# Patient Record
Sex: MALE | Race: WHITE | Employment: OTHER | URBAN - METROPOLITAN AREA
[De-identification: names, ages, dates, MRNs, and addresses within clinical notes are randomized per-mention and may not be internally consistent; named-entity substitution may affect disease eponyms.]

---

## 2017-05-22 PROBLEM — M51.36 DDD (DEGENERATIVE DISC DISEASE), LUMBAR: Status: ACTIVE | Noted: 2017-05-22

## 2017-05-22 PROBLEM — M51.369 DDD (DEGENERATIVE DISC DISEASE), LUMBAR: Status: ACTIVE | Noted: 2017-05-22

## 2017-05-22 PROBLEM — M48.061 LUMBAR SPINAL STENOSIS: Status: ACTIVE | Noted: 2017-05-22

## 2019-01-10 ENCOUNTER — HOSPITAL ENCOUNTER (OUTPATIENT)
Age: 69
Discharge: HOME | End: 2019-01-10
Payer: MEDICARE

## 2019-01-10 LAB
EKG ATRIAL RATE: 64 BPM
EKG P AXIS: 24 DEGREES
EKG P-R INTERVAL: 170 MS
EKG Q-T INTERVAL: 416 MS
EKG QRS DURATION: 114 MS
EKG QTC CALCULATION (BAZETT): 429 MS
EKG R AXIS: -18 DEGREES
EKG T AXIS: 26 DEGREES
EKG VENTRICULAR RATE: 64 BPM

## 2019-01-10 PROCEDURE — 93005 ELECTROCARDIOGRAM TRACING: CPT | Performed by: ANESTHESIOLOGY

## 2019-01-10 PROCEDURE — 93010 ELECTROCARDIOGRAM REPORT: CPT | Performed by: INTERNAL MEDICINE

## 2019-12-29 ENCOUNTER — APPOINTMENT (OUTPATIENT)
Dept: GENERAL RADIOLOGY | Age: 69
End: 2019-12-29
Payer: MEDICARE

## 2019-12-29 ENCOUNTER — HOSPITAL ENCOUNTER (EMERGENCY)
Age: 69
Discharge: HOME | End: 2019-12-29
Attending: EMERGENCY MEDICINE
Payer: MEDICARE

## 2019-12-29 VITALS
TEMPERATURE: 97.7 F | HEART RATE: 70 BPM | BODY MASS INDEX: 34.46 KG/M2 | OXYGEN SATURATION: 95 % | HEIGHT: 73 IN | RESPIRATION RATE: 16 BRPM | WEIGHT: 260 LBS | SYSTOLIC BLOOD PRESSURE: 131 MMHG | DIASTOLIC BLOOD PRESSURE: 59 MMHG

## 2019-12-29 LAB
ALBUMIN SERPL-MCNC: 3.8 G/DL (ref 3.5–5.2)
ALP BLD-CCNC: 59 U/L (ref 40–129)
ALT SERPL-CCNC: 12 U/L (ref 0–40)
ANION GAP SERPL CALCULATED.3IONS-SCNC: 13 MMOL/L (ref 7–16)
AST SERPL-CCNC: 12 U/L (ref 0–39)
BASOPHILS ABSOLUTE: 0.03 E9/L (ref 0–0.2)
BASOPHILS RELATIVE PERCENT: 0.3 % (ref 0–2)
BILIRUB SERPL-MCNC: 0.7 MG/DL (ref 0–1.2)
BUN BLDV-MCNC: 16 MG/DL (ref 8–23)
CALCIUM SERPL-MCNC: 9.1 MG/DL (ref 8.6–10.2)
CHLORIDE BLD-SCNC: 101 MMOL/L (ref 98–107)
CO2: 23 MMOL/L (ref 22–29)
CREAT SERPL-MCNC: 0.8 MG/DL (ref 0.7–1.2)
EKG ATRIAL RATE: 66 BPM
EKG P AXIS: 23 DEGREES
EKG P-R INTERVAL: 174 MS
EKG Q-T INTERVAL: 420 MS
EKG QRS DURATION: 112 MS
EKG QTC CALCULATION (BAZETT): 440 MS
EKG R AXIS: -25 DEGREES
EKG T AXIS: 18 DEGREES
EKG VENTRICULAR RATE: 66 BPM
EOSINOPHILS ABSOLUTE: 0.17 E9/L (ref 0.05–0.5)
EOSINOPHILS RELATIVE PERCENT: 1.9 % (ref 0–6)
GFR AFRICAN AMERICAN: >60
GFR NON-AFRICAN AMERICAN: >60 ML/MIN/1.73
GLUCOSE BLD-MCNC: 328 MG/DL (ref 74–99)
HCT VFR BLD CALC: 42.4 % (ref 37–54)
HEMOGLOBIN: 14 G/DL (ref 12.5–16.5)
IMMATURE GRANULOCYTES #: 0.04 E9/L
IMMATURE GRANULOCYTES %: 0.4 % (ref 0–5)
LYMPHOCYTES ABSOLUTE: 1.15 E9/L (ref 1.5–4)
LYMPHOCYTES RELATIVE PERCENT: 12.6 % (ref 20–42)
MCH RBC QN AUTO: 32 PG (ref 26–35)
MCHC RBC AUTO-ENTMCNC: 33 % (ref 32–34.5)
MCV RBC AUTO: 96.8 FL (ref 80–99.9)
MONOCYTES ABSOLUTE: 0.48 E9/L (ref 0.1–0.95)
MONOCYTES RELATIVE PERCENT: 5.3 % (ref 2–12)
NEUTROPHILS ABSOLUTE: 7.23 E9/L (ref 1.8–7.3)
NEUTROPHILS RELATIVE PERCENT: 79.5 % (ref 43–80)
PDW BLD-RTO: 13.2 FL (ref 11.5–15)
PLATELET # BLD: 151 E9/L (ref 130–450)
PMV BLD AUTO: 10 FL (ref 7–12)
POTASSIUM SERPL-SCNC: 4.2 MMOL/L (ref 3.5–5)
RBC # BLD: 4.38 E12/L (ref 3.8–5.8)
SODIUM BLD-SCNC: 137 MMOL/L (ref 132–146)
TOTAL PROTEIN: 6.9 G/DL (ref 6.4–8.3)
TROPONIN: <0.01 NG/ML (ref 0–0.03)
TSH SERPL DL<=0.05 MIU/L-ACNC: 1.27 UIU/ML (ref 0.27–4.2)
WBC # BLD: 9.1 E9/L (ref 4.5–11.5)

## 2019-12-29 PROCEDURE — 85025 COMPLETE CBC W/AUTO DIFF WBC: CPT

## 2019-12-29 PROCEDURE — 2500000003 HC RX 250 WO HCPCS: Performed by: PHYSICIAN ASSISTANT

## 2019-12-29 PROCEDURE — 96374 THER/PROPH/DIAG INJ IV PUSH: CPT

## 2019-12-29 PROCEDURE — 80053 COMPREHEN METABOLIC PANEL: CPT

## 2019-12-29 PROCEDURE — 84484 ASSAY OF TROPONIN QUANT: CPT

## 2019-12-29 PROCEDURE — 99285 EMERGENCY DEPT VISIT HI MDM: CPT

## 2019-12-29 PROCEDURE — 93005 ELECTROCARDIOGRAM TRACING: CPT | Performed by: PHYSICIAN ASSISTANT

## 2019-12-29 PROCEDURE — 71046 X-RAY EXAM CHEST 2 VIEWS: CPT

## 2019-12-29 PROCEDURE — 6370000000 HC RX 637 (ALT 250 FOR IP): Performed by: PHYSICIAN ASSISTANT

## 2019-12-29 PROCEDURE — 96375 TX/PRO/DX INJ NEW DRUG ADDON: CPT

## 2019-12-29 PROCEDURE — 6360000002 HC RX W HCPCS: Performed by: PHYSICIAN ASSISTANT

## 2019-12-29 PROCEDURE — 36415 COLL VENOUS BLD VENIPUNCTURE: CPT

## 2019-12-29 PROCEDURE — 84443 ASSAY THYROID STIM HORMONE: CPT

## 2019-12-29 RX ORDER — ASPIRIN 81 MG/1
324 TABLET, CHEWABLE ORAL ONCE
Status: COMPLETED | OUTPATIENT
Start: 2019-12-29 | End: 2019-12-29

## 2019-12-29 RX ORDER — DIPHENHYDRAMINE HYDROCHLORIDE 50 MG/ML
25 INJECTION INTRAMUSCULAR; INTRAVENOUS ONCE
Status: COMPLETED | OUTPATIENT
Start: 2019-12-29 | End: 2019-12-29

## 2019-12-29 RX ORDER — PREDNISONE 10 MG/1
TABLET ORAL
Qty: 20 TABLET | Refills: 0 | Status: SHIPPED | OUTPATIENT
Start: 2019-12-29 | End: 2020-01-08

## 2019-12-29 RX ORDER — METHYLPREDNISOLONE SODIUM SUCCINATE 125 MG/2ML
125 INJECTION, POWDER, LYOPHILIZED, FOR SOLUTION INTRAMUSCULAR; INTRAVENOUS ONCE
Status: COMPLETED | OUTPATIENT
Start: 2019-12-29 | End: 2019-12-29

## 2019-12-29 RX ADMIN — ASPIRIN 81 MG 324 MG: 81 TABLET ORAL at 08:34

## 2019-12-29 RX ADMIN — DIPHENHYDRAMINE HYDROCHLORIDE 25 MG: 50 INJECTION, SOLUTION INTRAMUSCULAR; INTRAVENOUS at 08:37

## 2019-12-29 RX ADMIN — FAMOTIDINE 20 MG: 10 INJECTION, SOLUTION INTRAVENOUS at 08:40

## 2019-12-29 RX ADMIN — METHYLPREDNISOLONE SODIUM SUCCINATE 125 MG: 125 INJECTION, POWDER, FOR SOLUTION INTRAMUSCULAR; INTRAVENOUS at 08:35

## 2019-12-29 ASSESSMENT — PAIN DESCRIPTION - DESCRIPTORS: DESCRIPTORS: ITCHING

## 2019-12-29 ASSESSMENT — PAIN SCALES - GENERAL: PAINLEVEL_OUTOF10: 10

## 2019-12-29 NOTE — ED PROVIDER NOTES
ED Attending  CC: No        HPI:  12/29/19,   Time: 8:17 AM         Aye Walker is a 78 y.o. male presenting to the ED for hives and CP, beginning 4 days ago. The complaint has been intermittent, moderate in severity, and worsened by nothing. The patient presents to the emergency room via private car complaining of a rash. He reports a rash on his hands upper extremities chest and back that began a few days ago out of the blue. He states that the rash is itchy and uncomfortable. He is not on any new medications. Denies any new soaps lotions or shampoos. He also reports intermittent chest discomfort. The patient states that the pain is left-sided and does not radiate. He reports that it started on Ethan day and then resolved about a day and a half later. He denies any shortness of breath nausea or diaphoresis. The patient states he does feel a little irritable which is similar to the way he felt 4 years ago when he had his heart attack. The patient states that he had a catheterization at the time and Dr. Lopez Upton placed a coronary stent. The patient reports that this was 4 years ago. He did have a stress test he believes about a year ago. The patient is a diabetic as well. He is not having any chest pain at this time.         ROS:     Constitutional: Negative for fever and chills  HENT: Negative for ear pain, sore throat and sinus pressure  Eyes: Negative for pain, discharge and redness  Respiratory:  See HPI  Cardiovascular: See HPI  Gastrointestinal: Negative for nausea, vomiting, diarrhea and abdominal distention  Genitourinary: Negative for dysuria and frequency  Musculoskeletal: Negative for back pain and arthralgia  Skin: See HPI  Neurological: Negative for weakness and headaches  Hematological: Negative for adenopathy    All other systems reviewed and are negative      -------------------------------- PAST HISTORY ----------------------------------  Past Medical History:  has a past 0. 17 0.05 - 0.50 E9/L    Basophils Absolute 0.03 0.00 - 0.20 E9/L   Comprehensive Metabolic Panel   Result Value Ref Range    Sodium 137 132 - 146 mmol/L    Potassium 4.2 3.5 - 5.0 mmol/L    Chloride 101 98 - 107 mmol/L    CO2 23 22 - 29 mmol/L    Anion Gap 13 7 - 16 mmol/L    Glucose 328 (H) 74 - 99 mg/dL    BUN 16 8 - 23 mg/dL    CREATININE 0.8 0.7 - 1.2 mg/dL    GFR Non-African American >60 >=60 mL/min/1.73    GFR African American >60     Calcium 9.1 8.6 - 10.2 mg/dL    Total Protein 6.9 6.4 - 8.3 g/dL    Alb 3.8 3.5 - 5.2 g/dL    Total Bilirubin 0.7 0.0 - 1.2 mg/dL    Alkaline Phosphatase 59 40 - 129 U/L    ALT 12 0 - 40 U/L    AST 12 0 - 39 U/L   Troponin   Result Value Ref Range    Troponin <0.01 0.00 - 0.03 ng/mL   TSH without Reflex   Result Value Ref Range    TSH 1.270 0.270 - 4.200 uIU/mL   EKG 12 Lead   Result Value Ref Range    Ventricular Rate 66 BPM    Atrial Rate 66 BPM    P-R Interval 174 ms    QRS Duration 112 ms    Q-T Interval 420 ms    QTc Calculation (Bazett) 440 ms    P Axis 23 degrees    R Axis -25 degrees    T Axis 18 degrees       RADIOLOGY:  Interpreted by Radiologist.  XR CHEST STANDARD (2 VW)   Final Result   1. No acute cardiopulmonary disease.          ----------------- NURSING NOTES AND VITALS REVIEWED ---------------   The nursing notes within the ED encounter and vital signs as below have been reviewed. /78   Pulse 70   Temp 98 °F (36.7 °C) (Oral)   Resp 16   Ht 6' 1\" (1.854 m)   Wt 260 lb (117.9 kg)   SpO2 98%   BMI 34.30 kg/m²   Oxygen Saturation Interpretation: Normal      --------------------------------PHYSICAL EXAM------------------------------------      Constitutional/General: Alert and oriented x3, well appearing, non toxic in NAD  Head: NC/AT  Eyes: PERRL, EOMI  Mouth: Oropharynx clear, handling secretions, no trismus  Neck: Supple, full ROM, no meningeal signs  Pulmonary: Lungs clear to auscultation bilaterally, no wheezes, rales, or rhonchi.  Not in respiratory distress  Cardiovascular:  Regular rate and rhythm, no murmurs, gallops, or rubs. 2+ distal pulses  Abdomen: Soft, + BS. No distension. Nontender. No palpable rigidity, rebound or guarding  Extremities: Moves all extremities x 4. Warm and well perfused  Skin: urticarial rash noted on upper back, chest and palms  Neurologic: GCS 15,  Intact. No focal deficits  Psych: Normal Affect      ------------------------ ED COURSE/MEDICAL DECISION MAKING----------------------  Medications   aspirin chewable tablet 324 mg (324 mg Oral Given 12/29/19 0834)   diphenhydrAMINE (BENADRYL) injection 25 mg (25 mg Intravenous Given 12/29/19 0837)   methylPREDNISolone sodium (SOLU-MEDROL) injection 125 mg (125 mg Intravenous Given 12/29/19 0835)   famotidine (PEPCID) injection 20 mg (20 mg Intravenous Given 12/29/19 0840)         Medical Decision Making:    *The patient's work-up here is negative. No ischemic changes seen on EKG and his labs are completely normal including CBC CMP and troponin other than hyperglycemia. Exact cause of the hives is uncertain. He needs to go back into his diet and life and see if he can figure out anything new or different. He states that he will discuss this with his wife. Advised to follow-up with his PCP in the a.m. as planned. Further cardiac work-up as indicated. He probably should see his cardiologist in the near future as well. The patient is aware and agreeable to this plan. Plan discharge home with prednisone x5 days. He can use Benadryl and some topical hydrocortisone to the areas as needed. **  ED Course as of Dec 29 1016   Sun Dec 29, 2019   0823 ATTENDING PROVIDER ATTESTATION:     I have personally performed and/or participated in the history, exam, medical decision making, and procedures and agree with all pertinent clinical information unless otherwise noted.     I have also reviewed and agree with the past medical, family and social history unless otherwise noted.  My findings/plan: Patient here complaining of hives starting yesterday, itching. Involves extremities and torso. No known exposures. No facial swelling. No difficulty breathing or swallowing. No chest pain. Incidental during the entire review of systems he comments on some chest pain that he has been having 5 and 6 days ago couple of fleeting episodes of left chest pain. Not associated shortness of breath or palpitations or syncope and has had no recurrence since then. No symptoms today. No treatment prior to arrival for anything. On exam he is awake, alert and oriented with no focal neurologic deficit. Heart rate regular. No angioedema, no facial edema. Lungs are clear and equal.  Abdomen nontender. Mild erythematous blanching hives noted to the torso and extremities, he is scratching. No other rashes. No petechia or purpura. No pretibial edema or calf pain. [NC]      ED Course User Index  [NC] [place], DO       Counseling: The emergency provider has spoken with the  and discussed todays results, in addition to providing specific details for the plan of care and counseling regarding the diagnosis and prognosis. Questions are answered at this time and they are agreeable with the plan.      ------------------------ IMPRESSION AND DISPOSITION -------------------------------    IMPRESSION  1. Urticaria    2.  Chest pain, unspecified type        DISPOSITION  Disposition: Discharge to home  Patient condition is stable                   Lino Marquez PA-C  12/29/19 3384

## 2019-12-29 NOTE — ED NOTES
Patient to x-ray at this time.       [facility]  12/29/19 1079

## 2020-01-09 ENCOUNTER — OFFICE VISIT (OUTPATIENT)
Dept: CARDIOLOGY CLINIC | Age: 70
End: 2020-01-09
Payer: MEDICARE

## 2020-01-09 ENCOUNTER — TELEPHONE (OUTPATIENT)
Dept: CARDIOLOGY CLINIC | Age: 70
End: 2020-01-09

## 2020-01-09 VITALS
WEIGHT: 250 LBS | SYSTOLIC BLOOD PRESSURE: 124 MMHG | HEART RATE: 65 BPM | BODY MASS INDEX: 33.13 KG/M2 | DIASTOLIC BLOOD PRESSURE: 70 MMHG | HEIGHT: 73 IN

## 2020-01-09 PROBLEM — I63.9 CVA (CEREBROVASCULAR ACCIDENT) (HCC): Status: ACTIVE | Noted: 2020-01-09

## 2020-01-09 PROCEDURE — 93000 ELECTROCARDIOGRAM COMPLETE: CPT | Performed by: INTERNAL MEDICINE

## 2020-01-09 PROCEDURE — 99214 OFFICE O/P EST MOD 30 MIN: CPT | Performed by: INTERNAL MEDICINE

## 2020-01-09 RX ORDER — LOSARTAN POTASSIUM 50 MG/1
50 TABLET ORAL DAILY
Status: ON HOLD | COMMUNITY
End: 2020-08-07 | Stop reason: HOSPADM

## 2020-01-09 RX ORDER — AMLODIPINE BESYLATE 5 MG/1
5 TABLET ORAL DAILY
Status: ON HOLD | COMMUNITY
End: 2020-08-07 | Stop reason: HOSPADM

## 2020-01-09 RX ORDER — ISOSORBIDE MONONITRATE 30 MG/1
30 TABLET, EXTENDED RELEASE ORAL DAILY
Qty: 30 TABLET | Refills: 5 | Status: ON HOLD
Start: 2020-01-09 | End: 2020-08-04

## 2020-01-09 RX ORDER — INSULIN GLARGINE 100 [IU]/ML
50 INJECTION, SOLUTION SUBCUTANEOUS 2 TIMES DAILY
COMMUNITY

## 2020-01-09 RX ORDER — INSULIN NPH HUM/REG INSULIN HM 70-30/ML
30 VIAL (ML) SUBCUTANEOUS EVERY MORNING
COMMUNITY

## 2020-01-09 RX ORDER — GLIPIZIDE 10 MG/1
10 TABLET ORAL 4 TIMES DAILY
COMMUNITY
End: 2020-11-20 | Stop reason: DRUGHIGH

## 2020-01-09 RX ORDER — ZINC GLUCONATE 50 MG
50 TABLET ORAL DAILY
Status: ON HOLD | COMMUNITY
End: 2020-11-25 | Stop reason: HOSPADM

## 2020-01-09 RX ORDER — MAGNESIUM OXIDE 400 MG/1
400 TABLET ORAL DAILY
COMMUNITY

## 2020-01-09 RX ORDER — METOPROLOL TARTRATE 100 MG/1
100 TABLET ORAL 2 TIMES DAILY
COMMUNITY
End: 2020-11-20 | Stop reason: DRUGHIGH

## 2020-01-09 NOTE — PROGRESS NOTES
removed from his left ear about a month ago  11. Hemoglobin A1c is unknown states his sugars have been out of whack having been on steroid and Benadryl for a rash in the recent past                  The above issues have been discussed with the patient in detail        Please note: past medical records were reviewed per electronic medical record (EMR) - see detailed reports under Past Medical/ Surgical History. PAST MEDICAL HISTORY:    Past Medical History:   Diagnosis Date    Arthritis     CAD (coronary artery disease) 3/20/2014    Cancer ([facility]     skin-x2 (ear-Dr. Дмитрий Amado)    Chronic fatigue fibromyalgia syndrome     Deafness in right ear     Diabetes mellitus ([facility]     type 2    GERD (gastroesophageal reflux disease)     Hypertension     Morbid obesity ([facility]     Neuromuscular disorder ([facility]     Non-ST elevation MI (NSTEMI) ([facility] 10/2015    patient was transported to [facility] due to aversion from [address]:    Past Surgical History:   Procedure Laterality Date    CARDIAC CATHETERIZATION  3 11 14    Mildl to moderate CAD. Normal left venricular size and systolic functiion. Systemic hypertension. [facility]    COLONOSCOPY      CORONARY ANGIOPLASTY  10/2015    Dr. Joel Lewis MID/DISTAL CX. PCI of the mid and distal dx artery with Alpine ZACARIAS with MATT grade III flow    DIAGNOSTIC CARDIAC CATH LAB PROCEDURE  02/15/2016    PATENT STENT IN CX, LAD 30%, RCA 40% AT [facility]. EF 60% EF    ENDOSCOPY, COLON, DIAGNOSTIC      GALLBLADDER SURGERY  1982    [address]     SHOULDER ARTHROSCOPY      SKIN CANCER EXCISION  01/2020    x2-ear-Dr. Дмитрий Amado    TUMOR REMOVAL  bladder tumor       HOME MEDICATIONS:  Prior to Admission medications    Medication Sig Start Date End Date Taking?  Authorizing Provider   losartan (COZAAR) 50 MG tablet Take 50 mg by mouth daily   Yes Historical Provider, MD   amLODIPine (NORVASC) 5 MG tablet Take 5 mg by mouth 2 times daily   Yes Historical Provider, MD   glipiZIDE (GLUCOTROL) 10 MG tablet Take 10 mg by mouth 2 times daily (before meals)   Yes Historical Provider, MD   metoprolol (LOPRESSOR) 100 MG tablet Take 100 mg by mouth 2 times daily   Yes Historical Provider, MD   insulin 70-30 (HUMULIN 70/30) (70-30) 100 UNIT per ML injection vial Inject 20 Units into the skin 2 times daily   Yes Historical Provider, MD   insulin glargine (LANTUS) 100 UNIT/ML injection vial Inject 50 Units into the skin 2 times daily   Yes Historical Provider, MD   MELATONIN ER PO Take 15 mg by mouth daily   Yes Historical Provider, MD   zinc gluconate 50 MG tablet Take 50 mg by mouth daily   Yes Historical Provider, MD   magnesium oxide (MAG-OX) 400 MG tablet Take 400 mg by mouth daily   Yes Historical Provider, MD   clopidogrel (PLAVIX) 75 MG tablet Take 75 mg by mouth daily  3/27/17  Yes Historical Provider, MD   rosuvastatin (CRESTOR) 40 MG tablet 40 mg daily  4/28/17  Yes Historical Provider, MD   Ascorbic Acid (VITAMIN C) 250 MG tablet Take 1,000 mg by mouth daily   Yes Historical Provider, MD   aspirin 81 MG tablet Take 81 mg by mouth daily. Yes Historical Provider, MD   sitaGLIPtan (JANUVIA) 100 MG tablet Take 100 mg by mouth daily.    Yes Historical Provider, MD       CURRENT MEDICATIONS:      Current Outpatient Medications:     losartan (COZAAR) 50 MG tablet, Take 50 mg by mouth daily, Disp: , Rfl:     amLODIPine (NORVASC) 5 MG tablet, Take 5 mg by mouth 2 times daily, Disp: , Rfl:     glipiZIDE (GLUCOTROL) 10 MG tablet, Take 10 mg by mouth 2 times daily (before meals), Disp: , Rfl:     metoprolol (LOPRESSOR) 100 MG tablet, Take 100 mg by mouth 2 times daily, Disp: , Rfl:     insulin 70-30 (HUMULIN 70/30) (70-30) 100 UNIT per ML injection vial, Inject 20 Units into the skin 2 times daily, Disp: , Rfl:     insulin glargine (LANTUS) 100 UNIT/ML injection vial, Inject 50 Units into the skin 2 times daily, bruit. Jvp not elevated  Heart Inspection- shows no abnormal  pulsations. Heart Palpation- no heaves or thrills; PMI is non-displaced. Heart Ausculation- Regular rate and rhythm, no murmur. No s3, s4 or rub. ABDOMEN: Soft, non-tender to light palpation. Bowel sounds present. No palpable masses no organomegaly; no abdominal bruit. MS: Good muscle strength and tone. No atrophy or abnormal movements. EXTREMITIES:  : Deferred  SKIN: Warm and dry. NEURO  CN 2-12 intact  no motor deficits    PSYCH: Oriented to person, place and time. Speech clear and appropriate. Follows all commands. Pleasant affect. DATA:    ECG    (interpreted by me):    Sinus  Rhythm   -Incomplete right bundle branch block. ABNORMAL     Diagnostic:        Labs:   CBC: No results for input(s): WBC, HGB, HCT, PLT in the last 72 hours. BMP: No results for input(s): NA, K, CO2, BUN, CREATININE, LABGLOM, CALCIUM in the last 72 hours. Invalid input(s): GLU  Mag: No results for input(s): MG in the last 72 hours. Phos: No results for input(s): PHOS in the last 72 hours. TSH: No results for input(s): TSH in the last 72 hours. HgA1c:   Lab Results   Component Value Date    LABA1C 6.8 (H) 03/11/2014     No results found for: EAG  BNP: No results for input(s): BNP in the last 72 hours. PT/INR: No results for input(s): PROTIME, INR in the last 72 hours. APTT:No results for input(s): APTT in the last 72 hours. CARDIAC ENZYMES:No results for input(s): CKTOTAL, CKMB, CKMBINDEX, TROPONINI in the last 72 hours. FASTING LIPID PANEL:  Lab Results   Component Value Date    CHOL 179 08/12/2014    HDL 40 08/12/2014    LDLCALC 109 08/12/2014    TRIG 152 08/12/2014     LIVER PROFILE:No results for input(s): AST, ALT, LABALBU in the last 72 hours.           ASSESSMENT:  Jaw pain chest pain this patient with previous acute non-STEMI and 2015 with 2.5 x 38 mm alpine drug-eluting stent in the mid to distal circumflex    History of diabetes mellitus

## 2020-01-16 ENCOUNTER — HOSPITAL ENCOUNTER (OUTPATIENT)
Dept: MRI IMAGING | Age: 70
Discharge: HOME | End: 2020-01-18
Payer: MEDICARE

## 2020-01-16 PROCEDURE — 70551 MRI BRAIN STEM W/O DYE: CPT

## 2020-01-21 ENCOUNTER — HOSPITAL ENCOUNTER (OUTPATIENT)
Dept: NUCLEAR MEDICINE | Age: 70
Discharge: HOME | End: 2020-01-21
Payer: MEDICARE

## 2020-01-21 ENCOUNTER — HOSPITAL ENCOUNTER (OUTPATIENT)
Dept: NON INVASIVE DIAGNOSTICS | Age: 70
Discharge: HOME | End: 2020-01-21
Payer: MEDICARE

## 2020-01-21 ENCOUNTER — HOSPITAL ENCOUNTER (OUTPATIENT)
Dept: INTERVENTIONAL RADIOLOGY/VASCULAR | Age: 70
Discharge: HOME | End: 2020-01-23
Payer: MEDICARE

## 2020-01-21 LAB
LV EF: 45 %
LV EF: 69 %
LVEF MODALITY: NORMAL
LVEF MODALITY: NORMAL

## 2020-01-21 PROCEDURE — 6360000004 HC RX CONTRAST MEDICATION: Performed by: INTERNAL MEDICINE

## 2020-01-21 PROCEDURE — 78452 HT MUSCLE IMAGE SPECT MULT: CPT | Performed by: INTERNAL MEDICINE

## 2020-01-21 PROCEDURE — 93880 EXTRACRANIAL BILAT STUDY: CPT

## 2020-01-21 PROCEDURE — A9500 TC99M SESTAMIBI: HCPCS | Performed by: RADIOLOGY

## 2020-01-21 PROCEDURE — 93018 CV STRESS TEST I&R ONLY: CPT | Performed by: INTERNAL MEDICINE

## 2020-01-21 PROCEDURE — 3430000000 HC RX DIAGNOSTIC RADIOPHARMACEUTICAL: Performed by: RADIOLOGY

## 2020-01-21 PROCEDURE — 93016 CV STRESS TEST SUPVJ ONLY: CPT | Performed by: INTERNAL MEDICINE

## 2020-01-21 PROCEDURE — 6360000002 HC RX W HCPCS: Performed by: INTERNAL MEDICINE

## 2020-01-21 PROCEDURE — 2580000003 HC RX 258: Performed by: INTERNAL MEDICINE

## 2020-01-21 PROCEDURE — 93017 CV STRESS TEST TRACING ONLY: CPT

## 2020-01-21 PROCEDURE — C8929 TTE W OR WO FOL WCON,DOPPLER: HCPCS

## 2020-01-21 PROCEDURE — 78452 HT MUSCLE IMAGE SPECT MULT: CPT

## 2020-01-21 RX ORDER — SODIUM CHLORIDE 0.9 % (FLUSH) 0.9 %
10 SYRINGE (ML) INJECTION PRN
Status: DISCONTINUED | OUTPATIENT
Start: 2020-01-21 | End: 2020-01-22 | Stop reason: HOSPADM

## 2020-01-21 RX ADMIN — Medication 10 ML: at 11:54

## 2020-01-21 RX ADMIN — Medication 30 MILLICURIE: at 14:26

## 2020-01-21 RX ADMIN — PERFLUTREN 2.2 MG: 6.52 INJECTION, SUSPENSION INTRAVENOUS at 11:52

## 2020-01-21 RX ADMIN — REGADENOSON 0.4 MG: 0.08 INJECTION, SOLUTION INTRAVENOUS at 14:10

## 2020-01-21 RX ADMIN — Medication 10 MILLICURIE: at 11:24

## 2020-01-21 NOTE — PROCEDURES
Stress Testing Procedure Note    Type of Stress Testing:  lexiscan    Date of Procedure:  1 /21/2020  Indication:  Chest pain    Impression:    Baseline EKG unremarkable  No chest pain or ST changes  With IV lexiscan  Nuclear results pending      Radhika Del Cid DO, [place]

## 2020-01-21 NOTE — PROCEDURES
Dr. Fady Nicole present, Completed Lexiscan Protocol 0.4 mg IV per Right forearm vein . Patient experienced increased breathing which lessened post sips of diet Pepsi.

## 2020-04-01 ENCOUNTER — TELEPHONE (OUTPATIENT)
Dept: CARDIOLOGY CLINIC | Age: 70
End: 2020-04-01

## 2020-08-04 ENCOUNTER — HOSPITAL ENCOUNTER (INPATIENT)
Age: 70
LOS: 1 days | Discharge: HOME | DRG: 313 | End: 2020-08-07
Attending: STUDENT IN AN ORGANIZED HEALTH CARE EDUCATION/TRAINING PROGRAM | Admitting: INTERNAL MEDICINE
Payer: MEDICARE

## 2020-08-04 ENCOUNTER — APPOINTMENT (OUTPATIENT)
Dept: CT IMAGING | Age: 70
DRG: 313 | End: 2020-08-04
Payer: MEDICARE

## 2020-08-04 LAB
ANION GAP SERPL CALCULATED.3IONS-SCNC: 12 MMOL/L (ref 7–16)
BASOPHILS ABSOLUTE: 0.05 E9/L (ref 0–0.2)
BASOPHILS RELATIVE PERCENT: 0.7 % (ref 0–2)
BUN BLDV-MCNC: 10 MG/DL (ref 8–23)
CALCIUM SERPL-MCNC: 8.7 MG/DL (ref 8.6–10.2)
CHLORIDE BLD-SCNC: 102 MMOL/L (ref 98–107)
CO2: 25 MMOL/L (ref 22–29)
CREAT SERPL-MCNC: 0.8 MG/DL (ref 0.7–1.2)
EOSINOPHILS ABSOLUTE: 0.24 E9/L (ref 0.05–0.5)
EOSINOPHILS RELATIVE PERCENT: 3.6 % (ref 0–6)
GFR AFRICAN AMERICAN: >60
GFR NON-AFRICAN AMERICAN: >60 ML/MIN/1.73
GLUCOSE BLD-MCNC: 182 MG/DL
GLUCOSE BLD-MCNC: 218 MG/DL (ref 74–99)
HCT VFR BLD CALC: 40.4 % (ref 37–54)
HEMOGLOBIN: 13.8 G/DL (ref 12.5–16.5)
IMMATURE GRANULOCYTES #: 0.03 E9/L
IMMATURE GRANULOCYTES %: 0.4 % (ref 0–5)
LYMPHOCYTES ABSOLUTE: 1.51 E9/L (ref 1.5–4)
LYMPHOCYTES RELATIVE PERCENT: 22.4 % (ref 20–42)
MCH RBC QN AUTO: 32.3 PG (ref 26–35)
MCHC RBC AUTO-ENTMCNC: 34.2 % (ref 32–34.5)
MCV RBC AUTO: 94.6 FL (ref 80–99.9)
METER GLUCOSE: 152 MG/DL (ref 74–99)
METER GLUCOSE: 182 MG/DL (ref 74–99)
MONOCYTES ABSOLUTE: 0.49 E9/L (ref 0.1–0.95)
MONOCYTES RELATIVE PERCENT: 7.3 % (ref 2–12)
NEUTROPHILS ABSOLUTE: 4.43 E9/L (ref 1.8–7.3)
NEUTROPHILS RELATIVE PERCENT: 65.6 % (ref 43–80)
PDW BLD-RTO: 13.2 FL (ref 11.5–15)
PLATELET # BLD: 171 E9/L (ref 130–450)
PMV BLD AUTO: 10.6 FL (ref 7–12)
POTASSIUM SERPL-SCNC: 4.5 MMOL/L (ref 3.5–5)
PRO-BNP: 224 PG/ML (ref 0–125)
RBC # BLD: 4.27 E12/L (ref 3.8–5.8)
SODIUM BLD-SCNC: 139 MMOL/L (ref 132–146)
TROPONIN: <0.01 NG/ML (ref 0–0.03)
TROPONIN: <0.01 NG/ML (ref 0–0.03)
WBC # BLD: 6.8 E9/L (ref 4.5–11.5)

## 2020-08-04 PROCEDURE — 6370000000 HC RX 637 (ALT 250 FOR IP): Performed by: INTERNAL MEDICINE

## 2020-08-04 PROCEDURE — 99285 EMERGENCY DEPT VISIT HI MDM: CPT

## 2020-08-04 PROCEDURE — 82962 GLUCOSE BLOOD TEST: CPT

## 2020-08-04 PROCEDURE — 2580000003 HC RX 258: Performed by: STUDENT IN AN ORGANIZED HEALTH CARE EDUCATION/TRAINING PROGRAM

## 2020-08-04 PROCEDURE — 71250 CT THORAX DX C-: CPT

## 2020-08-04 PROCEDURE — 6370000000 HC RX 637 (ALT 250 FOR IP): Performed by: STUDENT IN AN ORGANIZED HEALTH CARE EDUCATION/TRAINING PROGRAM

## 2020-08-04 PROCEDURE — 85025 COMPLETE CBC W/AUTO DIFF WBC: CPT

## 2020-08-04 PROCEDURE — 83036 HEMOGLOBIN GLYCOSYLATED A1C: CPT

## 2020-08-04 PROCEDURE — 80048 BASIC METABOLIC PNL TOTAL CA: CPT

## 2020-08-04 PROCEDURE — 84484 ASSAY OF TROPONIN QUANT: CPT

## 2020-08-04 PROCEDURE — G0378 HOSPITAL OBSERVATION PER HR: HCPCS

## 2020-08-04 PROCEDURE — 83880 ASSAY OF NATRIURETIC PEPTIDE: CPT

## 2020-08-04 PROCEDURE — 36415 COLL VENOUS BLD VENIPUNCTURE: CPT

## 2020-08-04 RX ORDER — ROSUVASTATIN CALCIUM 10 MG/1
40 TABLET, COATED ORAL NIGHTLY
Status: DISCONTINUED | OUTPATIENT
Start: 2020-08-04 | End: 2020-08-07 | Stop reason: HOSPADM

## 2020-08-04 RX ORDER — ONDANSETRON 2 MG/ML
4 INJECTION INTRAMUSCULAR; INTRAVENOUS EVERY 6 HOURS PRN
Status: DISCONTINUED | OUTPATIENT
Start: 2020-08-04 | End: 2020-08-07

## 2020-08-04 RX ORDER — INSULIN ASPART 100 [IU]/ML
14 INJECTION, SUSPENSION SUBCUTANEOUS
COMMUNITY
End: 2020-11-20 | Stop reason: DRUGHIGH

## 2020-08-04 RX ORDER — ZINC GLUCONATE 50 MG
50 TABLET ORAL DAILY
Status: DISCONTINUED | OUTPATIENT
Start: 2020-08-05 | End: 2020-08-07 | Stop reason: HOSPADM

## 2020-08-04 RX ORDER — 0.9 % SODIUM CHLORIDE 0.9 %
500 INTRAVENOUS SOLUTION INTRAVENOUS ONCE
Status: COMPLETED | OUTPATIENT
Start: 2020-08-04 | End: 2020-08-04

## 2020-08-04 RX ORDER — NICOTINE POLACRILEX 4 MG
15 LOZENGE BUCCAL PRN
Status: DISCONTINUED | OUTPATIENT
Start: 2020-08-04 | End: 2020-08-07 | Stop reason: HOSPADM

## 2020-08-04 RX ORDER — METOPROLOL TARTRATE 50 MG/1
100 TABLET, FILM COATED ORAL 2 TIMES DAILY
Status: DISCONTINUED | OUTPATIENT
Start: 2020-08-04 | End: 2020-08-07 | Stop reason: HOSPADM

## 2020-08-04 RX ORDER — OMEPRAZOLE 20 MG/1
20 CAPSULE, DELAYED RELEASE ORAL DAILY
Status: ON HOLD | COMMUNITY
End: 2020-08-07 | Stop reason: HOSPADM

## 2020-08-04 RX ORDER — ISOSORBIDE MONONITRATE 30 MG/1
30 TABLET, EXTENDED RELEASE ORAL DAILY
Status: DISCONTINUED | OUTPATIENT
Start: 2020-08-05 | End: 2020-08-05

## 2020-08-04 RX ORDER — TAMSULOSIN HYDROCHLORIDE 0.4 MG/1
0.4 CAPSULE ORAL NIGHTLY
COMMUNITY

## 2020-08-04 RX ORDER — LANOLIN ALCOHOL/MO/W.PET/CERES
15 CREAM (GRAM) TOPICAL DAILY
Status: DISCONTINUED | OUTPATIENT
Start: 2020-08-05 | End: 2020-08-07 | Stop reason: HOSPADM

## 2020-08-04 RX ORDER — DEXTROSE MONOHYDRATE 25 G/50ML
12.5 INJECTION, SOLUTION INTRAVENOUS PRN
Status: DISCONTINUED | OUTPATIENT
Start: 2020-08-04 | End: 2020-08-07 | Stop reason: HOSPADM

## 2020-08-04 RX ORDER — ASPIRIN 81 MG/1
TABLET, CHEWABLE ORAL
Status: DISPENSED
Start: 2020-08-04 | End: 2020-08-05

## 2020-08-04 RX ORDER — ALOGLIPTIN 25 MG/1
25 TABLET, FILM COATED ORAL DAILY
Status: DISCONTINUED | OUTPATIENT
Start: 2020-08-05 | End: 2020-08-07 | Stop reason: HOSPADM

## 2020-08-04 RX ORDER — CLOPIDOGREL BISULFATE 75 MG/1
75 TABLET ORAL DAILY
Status: DISCONTINUED | OUTPATIENT
Start: 2020-08-05 | End: 2020-08-07 | Stop reason: HOSPADM

## 2020-08-04 RX ORDER — INSULIN ASPART 100 [IU]/ML
28 INJECTION, SUSPENSION SUBCUTANEOUS
COMMUNITY
End: 2020-11-20 | Stop reason: DRUGHIGH

## 2020-08-04 RX ORDER — GLIPIZIDE 5 MG/1
10 TABLET ORAL
Status: DISCONTINUED | OUTPATIENT
Start: 2020-08-05 | End: 2020-08-07 | Stop reason: HOSPADM

## 2020-08-04 RX ORDER — ASPIRIN 81 MG/1
81 TABLET, CHEWABLE ORAL DAILY
Status: DISCONTINUED | OUTPATIENT
Start: 2020-08-05 | End: 2020-08-07 | Stop reason: HOSPADM

## 2020-08-04 RX ORDER — LOSARTAN POTASSIUM 50 MG/1
50 TABLET ORAL DAILY
Status: DISCONTINUED | OUTPATIENT
Start: 2020-08-05 | End: 2020-08-05

## 2020-08-04 RX ORDER — AMLODIPINE BESYLATE 5 MG/1
5 TABLET ORAL 2 TIMES DAILY
Status: DISCONTINUED | OUTPATIENT
Start: 2020-08-04 | End: 2020-08-07 | Stop reason: HOSPADM

## 2020-08-04 RX ORDER — ASCORBIC ACID 500 MG
1000 TABLET ORAL DAILY
Status: DISCONTINUED | OUTPATIENT
Start: 2020-08-05 | End: 2020-08-07 | Stop reason: HOSPADM

## 2020-08-04 RX ORDER — ASPIRIN 81 MG/1
243 TABLET, CHEWABLE ORAL ONCE
Status: COMPLETED | OUTPATIENT
Start: 2020-08-04 | End: 2020-08-04

## 2020-08-04 RX ORDER — LANOLIN ALCOHOL/MO/W.PET/CERES
400 CREAM (GRAM) TOPICAL DAILY
Status: DISCONTINUED | OUTPATIENT
Start: 2020-08-05 | End: 2020-08-07 | Stop reason: HOSPADM

## 2020-08-04 RX ORDER — DEXTROSE MONOHYDRATE 50 MG/ML
100 INJECTION, SOLUTION INTRAVENOUS PRN
Status: DISCONTINUED | OUTPATIENT
Start: 2020-08-04 | End: 2020-08-07 | Stop reason: HOSPADM

## 2020-08-04 RX ADMIN — SODIUM CHLORIDE 500 ML: 9 INJECTION, SOLUTION INTRAVENOUS at 18:51

## 2020-08-04 RX ADMIN — ASPIRIN 81 MG CHEWABLE TABLET 243 MG: 81 TABLET CHEWABLE at 18:48

## 2020-08-04 RX ADMIN — INSULIN LISPRO 1 UNITS: 100 INJECTION, SOLUTION INTRAVENOUS; SUBCUTANEOUS at 23:00

## 2020-08-04 RX ADMIN — AMLODIPINE BESYLATE 5 MG: 5 TABLET ORAL at 22:57

## 2020-08-04 RX ADMIN — ROSUVASTATIN CALCIUM 40 MG: 10 TABLET, COATED ORAL at 23:01

## 2020-08-04 RX ADMIN — METOPROLOL TARTRATE 100 MG: 50 TABLET, FILM COATED ORAL at 22:57

## 2020-08-04 ASSESSMENT — PAIN - FUNCTIONAL ASSESSMENT: PAIN_FUNCTIONAL_ASSESSMENT: 0-10

## 2020-08-04 NOTE — ED PROVIDER NOTES
HPI:  8/4/20, Time: 4:04 PM EDT         Luciano Morse is a 78 y.o. male presenting to the ED for chest pain, beginning 3 days ago. The complaint has been intermittent, moderate in severity, and worsened by nothing. Patient states that over the weekend he started having intermittent chest pain/pressure. He would be exerting or just sitting on the couch. He would feel it substernal radiating to the left side of his chest.  Patient has a history of CAD and had an MI 3 to 4 years ago. Currently takes a baby aspirin daily. States that he felt sweaty during these episodes, but was also very hot and humid. Denies any nausea/vomiting, abdominal pain, or other symptoms at this time. Denies fevers. Patient states that his blood sugar has been within normal limits. States that this morning it was elevated at 170. No other complaints at this time. Patient denies fever/chills, cough, congestion, shortness of breath, edema, headache, visual disturbances, focal paresthesias, focal weakness, abdominal pain, nausea, vomiting, diarrhea, constipation, dysuria, hematuria, trauma, neck or back pain or other complaints. ROS:   Pertinent positives and negatives are stated within HPI, all other systems reviewed and are negative.      --------------------------------------------- PAST HISTORY ---------------------------------------------  Past Medical History:  has a past medical history of Arthritis, CAD (coronary artery disease), Cancer ([facility] Chronic fatigue fibromyalgia syndrome, Deafness in right ear, Diabetes mellitus ([facility] GERD (gastroesophageal reflux disease), Hypertension, Morbid obesity ([facility] Neuromuscular disorder ([facility] [facility] and Non-ST elevation MI (NSTEMI) ([facility] Past Surgical History:  has a past surgical history that includes Shoulder arthroscopy; Gallbladder surgery (1982); tumor removal (bladder tumor); Rotator cuff repair (Right);  Endoscopy, colon, diagnostic; Colonoscopy; Cardiac catheterization (3 VITALS REVIEWED ---------------------------   The nursing notes within the ED encounter and vital signs as below have been reviewed by myself. BP (!) 200/100   Pulse 67   Temp 97.9 °F (36.6 °C) (Oral)   Resp 16   Ht 6' 1\" (1.854 m)   Wt 265 lb (120.2 kg)   SpO2 98%   BMI 34.96 kg/m²   Oxygen Saturation Interpretation: Normal    The patients available past medical records and past encounters were reviewed.         ------------------------------ ED COURSE/MEDICAL DECISION MAKING----------------------  Medications   aspirin 81 MG chewable tablet (has no administration in time range)   amLODIPine (NORVASC) tablet 5 mg (has no administration in time range)   vitamin C (ASCORBIC ACID) tablet 1,000 mg (has no administration in time range)   aspirin chewable tablet 81 mg (has no administration in time range)   clopidogrel (PLAVIX) tablet 75 mg (has no administration in time range)   glipiZIDE (GLUCOTROL) tablet 10 mg (has no administration in time range)   isosorbide mononitrate (IMDUR) extended release tablet 30 mg (has no administration in time range)   losartan (COZAAR) tablet 50 mg (has no administration in time range)   magnesium oxide (MAG-OX) tablet 400 mg (has no administration in time range)   metoprolol tartrate (LOPRESSOR) tablet 100 mg (has no administration in time range)   rosuvastatin (CRESTOR) tablet 40 mg (has no administration in time range)   zinc gluconate tablet 50 mg (has no administration in time range)   glucose (GLUTOSE) 40 % oral gel 15 g (has no administration in time range)   dextrose 50 % IV solution (has no administration in time range)   glucagon (rDNA) injection 1 mg (has no administration in time range)   dextrose 5 % solution (has no administration in time range)   insulin lispro (HUMALOG) injection vial 0-6 Units (has no administration in time range)   insulin lispro (HUMALOG) injection vial 0-3 Units (has no administration in time range)   ondansetron (ZOFRAN) injection 4 mg (has no administration in time range)   enoxaparin (LOVENOX) injection 40 mg (has no administration in time range)   alogliptin (NESINA) tablet 25 mg (has no administration in time range)   melatonin tablet 15 mg (has no administration in time range)   aspirin chewable tablet 243 mg (243 mg Oral Given 8/4/20 1848)   0.9 % sodium chloride bolus (0 mLs Intravenous Stopped 8/4/20 2201)       Medical Decision Making:     Discussed imaging and laboratories also patient. Labs are within normal limits. Imaging is negative for acute pathology. Patient's history is significant for an MI 4 years ago. He did have a stress test earlier this year. Sees Dr. Regan Brink for his cardiologist.  Patient story is concerning for intermittent chest pain over the past several days. Contacted admitting physician he agreed with further management. Patient agreed with this plan as well. Patient's heart score is 5. This patient's ED cOurse included: a personal history and physicial examination, re-evaluation prior to disposition, multiple bedside re-evaluations, IV medications, cardiac monitoring, continuous pulse oximetry, complex medical decision making and emergency management and a personal history and physicial eaxmination    This patient has remained hemodynamically stable and been closely monitored during their ED course. Consultations:             Spoke with Dr. Geronimo Hardin (Medicine). Discussed case. They will admit this patient. Counseling: The emergency provider has spoken with the patient and discussed todays results, in addition to providing specific details for the plan of care and counseling regarding the diagnosis and prognosis. Questions are answered at this time and they are agreeable with the plan.       --------------------------------- IMPRESSION AND DISPOSITION ---------------------------------    IMPRESSION  1.  Chest pain, unspecified type        DISPOSITION  Disposition: Admit to telemetry  Patient condition is stable                 Blanca Spain, DO  08/04/20 8051

## 2020-08-05 LAB
ADENOVIRUS BY PCR: NOT DETECTED
ANION GAP SERPL CALCULATED.3IONS-SCNC: 13 MMOL/L (ref 7–16)
BASOPHILS ABSOLUTE: 0.06 E9/L (ref 0–0.2)
BASOPHILS RELATIVE PERCENT: 0.7 % (ref 0–2)
BORDETELLA PARAPERTUSSIS BY PCR: NOT DETECTED
BORDETELLA PERTUSSIS BY PCR: NOT DETECTED
BUN BLDV-MCNC: 9 MG/DL (ref 8–23)
CALCIUM SERPL-MCNC: 8.8 MG/DL (ref 8.6–10.2)
CHLAMYDOPHILIA PNEUMONIAE BY PCR: NOT DETECTED
CHLORIDE BLD-SCNC: 101 MMOL/L (ref 98–107)
CHOLESTEROL, TOTAL: 129 MG/DL (ref 0–199)
CO2: 25 MMOL/L (ref 22–29)
CORONAVIRUS 229E BY PCR: NOT DETECTED
CORONAVIRUS HKU1 BY PCR: NOT DETECTED
CORONAVIRUS NL63 BY PCR: NOT DETECTED
CORONAVIRUS OC43 BY PCR: NOT DETECTED
CREAT SERPL-MCNC: 0.7 MG/DL (ref 0.7–1.2)
EOSINOPHILS ABSOLUTE: 0.32 E9/L (ref 0.05–0.5)
EOSINOPHILS RELATIVE PERCENT: 3.9 % (ref 0–6)
GFR AFRICAN AMERICAN: >60
GFR NON-AFRICAN AMERICAN: >60 ML/MIN/1.73
GLUCOSE BLD-MCNC: 214 MG/DL (ref 74–99)
HBA1C MFR BLD: 7.5 % (ref 4–5.6)
HCT VFR BLD CALC: 43.6 % (ref 37–54)
HDLC SERPL-MCNC: 44 MG/DL
HEMOGLOBIN: 14.2 G/DL (ref 12.5–16.5)
HUMAN METAPNEUMOVIRUS BY PCR: NOT DETECTED
HUMAN RHINOVIRUS/ENTEROVIRUS BY PCR: NOT DETECTED
IMMATURE GRANULOCYTES #: 0.03 E9/L
IMMATURE GRANULOCYTES %: 0.4 % (ref 0–5)
INFLUENZA A BY PCR: NOT DETECTED
INFLUENZA B BY PCR: NOT DETECTED
LDL CHOLESTEROL CALCULATED: 41 MG/DL (ref 0–99)
LYMPHOCYTES ABSOLUTE: 2.02 E9/L (ref 1.5–4)
LYMPHOCYTES RELATIVE PERCENT: 24.4 % (ref 20–42)
MAGNESIUM: 1.8 MG/DL (ref 1.6–2.6)
MCH RBC QN AUTO: 31.3 PG (ref 26–35)
MCHC RBC AUTO-ENTMCNC: 32.6 % (ref 32–34.5)
MCV RBC AUTO: 96.2 FL (ref 80–99.9)
METER GLUCOSE: 163 MG/DL (ref 74–99)
METER GLUCOSE: 193 MG/DL (ref 74–99)
METER GLUCOSE: 221 MG/DL (ref 74–99)
METER GLUCOSE: 227 MG/DL (ref 74–99)
MONOCYTES ABSOLUTE: 0.53 E9/L (ref 0.1–0.95)
MONOCYTES RELATIVE PERCENT: 6.4 % (ref 2–12)
MYCOPLASMA PNEUMONIAE BY PCR: NOT DETECTED
NEUTROPHILS ABSOLUTE: 5.32 E9/L (ref 1.8–7.3)
NEUTROPHILS RELATIVE PERCENT: 64.2 % (ref 43–80)
PARAINFLUENZA VIRUS 1 BY PCR: NOT DETECTED
PARAINFLUENZA VIRUS 2 BY PCR: NOT DETECTED
PARAINFLUENZA VIRUS 3 BY PCR: NOT DETECTED
PARAINFLUENZA VIRUS 4 BY PCR: NOT DETECTED
PDW BLD-RTO: 13.4 FL (ref 11.5–15)
PHOSPHORUS: 2.8 MG/DL (ref 2.5–4.5)
PLATELET # BLD: 166 E9/L (ref 130–450)
PMV BLD AUTO: 10.6 FL (ref 7–12)
POTASSIUM SERPL-SCNC: 3.7 MMOL/L (ref 3.5–5)
PROCALCITONIN: 0.07 NG/ML (ref 0–0.08)
RBC # BLD: 4.53 E12/L (ref 3.8–5.8)
RESPIRATORY SYNCYTIAL VIRUS BY PCR: NOT DETECTED
SARS-COV-2, NAAT: NOT DETECTED
SODIUM BLD-SCNC: 139 MMOL/L (ref 132–146)
TRIGL SERPL-MCNC: 222 MG/DL (ref 0–149)
TROPONIN: <0.01 NG/ML (ref 0–0.03)
TROPONIN: <0.01 NG/ML (ref 0–0.03)
TSH SERPL DL<=0.05 MIU/L-ACNC: 1.46 UIU/ML (ref 0.27–4.2)
VLDLC SERPL CALC-MCNC: 44 MG/DL
WBC # BLD: 8.3 E9/L (ref 4.5–11.5)

## 2020-08-05 PROCEDURE — APPSS60 APP SPLIT SHARED TIME 46-60 MINUTES: Performed by: PHYSICIAN ASSISTANT

## 2020-08-05 PROCEDURE — 6370000000 HC RX 637 (ALT 250 FOR IP): Performed by: INTERNAL MEDICINE

## 2020-08-05 PROCEDURE — 84145 PROCALCITONIN (PCT): CPT

## 2020-08-05 PROCEDURE — 80061 LIPID PANEL: CPT

## 2020-08-05 PROCEDURE — 96374 THER/PROPH/DIAG INJ IV PUSH: CPT

## 2020-08-05 PROCEDURE — 87450 HC DIRECT STREP B ANTIGEN: CPT

## 2020-08-05 PROCEDURE — 82962 GLUCOSE BLOOD TEST: CPT

## 2020-08-05 PROCEDURE — G0378 HOSPITAL OBSERVATION PER HR: HCPCS

## 2020-08-05 PROCEDURE — 80048 BASIC METABOLIC PNL TOTAL CA: CPT

## 2020-08-05 PROCEDURE — 84100 ASSAY OF PHOSPHORUS: CPT

## 2020-08-05 PROCEDURE — 84443 ASSAY THYROID STIM HORMONE: CPT

## 2020-08-05 PROCEDURE — 85025 COMPLETE CBC W/AUTO DIFF WBC: CPT

## 2020-08-05 PROCEDURE — 84484 ASSAY OF TROPONIN QUANT: CPT

## 2020-08-05 PROCEDURE — 96372 THER/PROPH/DIAG INJ SC/IM: CPT

## 2020-08-05 PROCEDURE — 36415 COLL VENOUS BLD VENIPUNCTURE: CPT

## 2020-08-05 PROCEDURE — 0100U HC RESPIRPTHGN MULT REV TRANS & AMP PRB TECH 21 TRGT: CPT

## 2020-08-05 PROCEDURE — U0002 COVID-19 LAB TEST NON-CDC: HCPCS

## 2020-08-05 PROCEDURE — 6360000002 HC RX W HCPCS: Performed by: NURSE PRACTITIONER

## 2020-08-05 PROCEDURE — 94640 AIRWAY INHALATION TREATMENT: CPT

## 2020-08-05 PROCEDURE — 96376 TX/PRO/DX INJ SAME DRUG ADON: CPT

## 2020-08-05 PROCEDURE — 99214 OFFICE O/P EST MOD 30 MIN: CPT | Performed by: INTERNAL MEDICINE

## 2020-08-05 PROCEDURE — 93005 ELECTROCARDIOGRAM TRACING: CPT | Performed by: INTERNAL MEDICINE

## 2020-08-05 PROCEDURE — 83735 ASSAY OF MAGNESIUM: CPT

## 2020-08-05 PROCEDURE — 6360000002 HC RX W HCPCS: Performed by: INTERNAL MEDICINE

## 2020-08-05 RX ORDER — BUDESONIDE 0.5 MG/2ML
0.5 INHALANT ORAL 2 TIMES DAILY
Status: DISCONTINUED | OUTPATIENT
Start: 2020-08-05 | End: 2020-08-07 | Stop reason: HOSPADM

## 2020-08-05 RX ORDER — BENZONATATE 100 MG/1
100 CAPSULE ORAL 3 TIMES DAILY PRN
Status: DISCONTINUED | OUTPATIENT
Start: 2020-08-05 | End: 2020-08-07 | Stop reason: HOSPADM

## 2020-08-05 RX ORDER — ISOSORBIDE MONONITRATE 60 MG/1
60 TABLET, EXTENDED RELEASE ORAL DAILY
Status: DISCONTINUED | OUTPATIENT
Start: 2020-08-05 | End: 2020-08-07 | Stop reason: HOSPADM

## 2020-08-05 RX ORDER — BUDESONIDE AND FORMOTEROL FUMARATE DIHYDRATE 160; 4.5 UG/1; UG/1
2 AEROSOL RESPIRATORY (INHALATION) 2 TIMES DAILY
Status: DISCONTINUED | OUTPATIENT
Start: 2020-08-05 | End: 2020-08-05 | Stop reason: CLARIF

## 2020-08-05 RX ORDER — ALBUTEROL SULFATE 90 UG/1
2 AEROSOL, METERED RESPIRATORY (INHALATION) EVERY 6 HOURS PRN
Status: DISCONTINUED | OUTPATIENT
Start: 2020-08-05 | End: 2020-08-05 | Stop reason: CLARIF

## 2020-08-05 RX ORDER — ARFORMOTEROL TARTRATE 15 UG/2ML
15 SOLUTION RESPIRATORY (INHALATION) 2 TIMES DAILY
Status: DISCONTINUED | OUTPATIENT
Start: 2020-08-05 | End: 2020-08-07 | Stop reason: HOSPADM

## 2020-08-05 RX ORDER — ALBUTEROL SULFATE 2.5 MG/3ML
2.5 SOLUTION RESPIRATORY (INHALATION) EVERY 6 HOURS PRN
Status: DISCONTINUED | OUTPATIENT
Start: 2020-08-05 | End: 2020-08-07 | Stop reason: HOSPADM

## 2020-08-05 RX ORDER — LOSARTAN POTASSIUM 50 MG/1
100 TABLET ORAL DAILY
Status: DISCONTINUED | OUTPATIENT
Start: 2020-08-05 | End: 2020-08-07 | Stop reason: HOSPADM

## 2020-08-05 RX ORDER — LOSARTAN POTASSIUM 50 MG/1
100 TABLET ORAL DAILY
Status: DISCONTINUED | OUTPATIENT
Start: 2020-08-06 | End: 2020-08-05

## 2020-08-05 RX ADMIN — INSULIN LISPRO 1 UNITS: 100 INJECTION, SOLUTION INTRAVENOUS; SUBCUTANEOUS at 21:33

## 2020-08-05 RX ADMIN — METOPROLOL TARTRATE 100 MG: 50 TABLET, FILM COATED ORAL at 21:28

## 2020-08-05 RX ADMIN — ONDANSETRON 4 MG: 2 INJECTION INTRAMUSCULAR; INTRAVENOUS at 23:58

## 2020-08-05 RX ADMIN — ONDANSETRON 4 MG: 2 INJECTION INTRAMUSCULAR; INTRAVENOUS at 06:43

## 2020-08-05 RX ADMIN — MELATONIN 15 MG: at 21:27

## 2020-08-05 RX ADMIN — INSULIN LISPRO 2 UNITS: 100 INJECTION, SOLUTION INTRAVENOUS; SUBCUTANEOUS at 17:05

## 2020-08-05 RX ADMIN — BUDESONIDE 500 MCG: 0.5 INHALANT RESPIRATORY (INHALATION) at 18:12

## 2020-08-05 RX ADMIN — GLIPIZIDE 10 MG: 5 TABLET ORAL at 16:55

## 2020-08-05 RX ADMIN — AMLODIPINE BESYLATE 5 MG: 5 TABLET ORAL at 21:28

## 2020-08-05 RX ADMIN — ONDANSETRON 4 MG: 2 INJECTION INTRAMUSCULAR; INTRAVENOUS at 13:32

## 2020-08-05 RX ADMIN — ENOXAPARIN SODIUM 40 MG: 40 INJECTION SUBCUTANEOUS at 09:12

## 2020-08-05 RX ADMIN — ROSUVASTATIN CALCIUM 40 MG: 10 TABLET, COATED ORAL at 21:27

## 2020-08-05 RX ADMIN — ARFORMOTEROL TARTRATE 15 MCG: 15 SOLUTION RESPIRATORY (INHALATION) at 18:12

## 2020-08-05 ASSESSMENT — PAIN SCALES - GENERAL
PAINLEVEL_OUTOF10: 7
PAINLEVEL_OUTOF10: 0

## 2020-08-05 ASSESSMENT — PAIN DESCRIPTION - DESCRIPTORS: DESCRIPTORS: ACHING

## 2020-08-05 ASSESSMENT — PAIN DESCRIPTION - PAIN TYPE: TYPE: ACUTE PAIN

## 2020-08-05 ASSESSMENT — PAIN DESCRIPTION - LOCATION: LOCATION: NECK

## 2020-08-05 NOTE — H&P
Department of Internal Medicine  History and Physical Examination     Primary Care Physician: Gilberto Reese DO   Admitting Physician:  Maureen Ma DO  Admission date and time: 8/4/2020  3:46 PM    Room:  [facility]1891-61  Admitting diagnosis: Chest pain [R07.9]    Patient Name: Sandy Boston  MRN: [id number]    Date of Service: 8/5/2020     Chief Complaint:  Chest pain    HISTORY OF PRESENT ILLNESS:    Michele Bey is a 69-year-old male patient who presented to the emergency department August 4 for evaluation of chest pain. Michele Bey admits that he goes to a [employer] where there have been multiple members with suspected [place] recently. He called his doctor with concern that he may have been exposed and his doctor sent him to the emergency department to be tested for this. Through the course of his ER evaluation he was not tested for COVID-19. A CT scan of the chest was performed with contrast and results are not yet available for review. Initial ER evaluation and regards to chest pain was negative. Given his known history of coronary artery disease and multiple comorbidities he was admitted for further care and management. He admits to ongoing issues with intermittent chest discomfort. He admits to mild shortness of breath, coughing and congestion which he characterizes more similar to that of bronchitis rather than his cardiac chest pain. He states this is extremely dissimilar to cardiac chest pain in the past.  Denies fevers or chills. Denies malaise or fatigue. Admits to COVID-19 exposures potentially. Admits to the chest pain and respiratory symptoms as described above. Denies sputum production or hemoptysis. Denies abdominal pain, vomiting, bowel pattern changes, hematochezia or melena. Denies urinary or genital complaints.     PAST MEDICAL Hx:  Past Medical History:   Diagnosis Date    Arthritis     CAD (coronary artery disease) 3/20/2014    [facility]     skin-x2 (ear-Dr. Lyle Velez)    Chronic fatigue fibromyalgia syndrome     Deafness in right ear     Diabetes mellitus ([facility]     type 2    GERD (gastroesophageal reflux disease)     Hypertension     Morbid obesity ([facility]     Neuromuscular disorder ([facility]     [facility]    patient was transported to [facility] due to aversion from [address] Hx:   Past Surgical History:   Procedure Laterality Date    CARDIAC CATHETERIZATION  3 11 14    Mildl to moderate CAD. Normal left venricular size and systolic functiion. Systemic hypertension. [facility]    COLONOSCOPY      CORONARY ANGIOPLASTY  10/2015    Dr. Reshma Zamudio MID/DISTAL CX. PCI of the mid and distal dx artery with [employer] ZACARIAS with MATT grade III flow    DIAGNOSTIC CARDIAC CATH LAB PROCEDURE  02/15/2016    PATENT STENT IN CX, LAD 30%, RCA 40% AT [facility]. EF 60% EF    ENDOSCOPY, COLON, DIAGNOSTIC      GALLBLADDER SURGERY  1982    [address]     SHOULDER ARTHROSCOPY      SKIN CANCER EXCISION  01/2020    x2-ear-Dr. Lucho Guzman    TUMOR REMOVAL  bladder tumor       FAMILY Hx:  Family History   Adopted: Yes   Family history unknown: Yes       HOME MEDICATIONS:  Prior to Admission medications    Medication Sig Start Date End Date Taking?  Authorizing Provider   tamsulosin (FLOMAX) 0.4 MG capsule Take 0.4 mg by mouth daily   Yes Historical Provider, MD   omeprazole (PRILOSEC) 20 MG delayed release capsule Take 20 mg by mouth daily   Yes Historical Provider, MD   Insulin Aspart Prot & Aspart (INSULIN ASP PROT & ASP FLEXPEN) (70-30) 100 UNIT/ML SUPN Inject 14 Units into the skin Daily with lunch   Yes Historical Provider, MD   Insulin Aspart Prot & Aspart (INSULIN ASP PROT & ASP FLEXPEN) (70-30) 100 UNIT/ML SUPN Inject 28 Units into the skin Daily with supper   Yes Historical Provider, MD   losartan (COZAAR) 50 MG tablet Take 50 mg by mouth daily   Yes Historical Provider, MD   amLODIPine (NORVASC) 5 MG tablet Take 5 mg by mouth daily    Yes Historical Provider, MD   glipiZIDE (GLUCOTROL) 10 MG tablet Take 10 mg by mouth 4 times daily    Yes Historical Provider, MD   metoprolol (LOPRESSOR) 100 MG tablet Take 100 mg by mouth 2 times daily   Yes Historical Provider, MD   insulin 70-30 (HUMULIN 70/30) (70-30) 100 UNIT per ML injection vial Inject 28 Units into the skin every morning    Yes Historical Provider, MD   insulin glargine (LANTUS) 100 UNIT/ML injection vial Inject 50 Units into the skin 2 times daily   Yes Historical Provider, MD   MELATONIN ER PO Take 20 mg by mouth daily    Yes Historical Provider, MD   zinc gluconate 50 MG tablet Take 50 mg by mouth daily   Yes Historical Provider, MD   magnesium oxide (MAG-OX) 400 MG tablet Take 400 mg by mouth daily   Yes Historical Provider, MD   clopidogrel (PLAVIX) 75 MG tablet Take 75 mg by mouth daily  3/27/17  Yes Historical Provider, MD   rosuvastatin (CRESTOR) 40 MG tablet 40 mg daily  4/28/17  Yes Historical Provider, MD   Ascorbic Acid (VITAMIN C) 250 MG tablet Take 1,000 mg by mouth daily   Yes Historical Provider, MD   aspirin 81 MG tablet Take 81 mg by mouth daily. Yes Historical Provider, MD   sitaGLIPtan (JANUVIA) 100 MG tablet Take 100 mg by mouth daily. Yes Historical Provider, MD       ALLERGIES:  Poison ivy extract;  Iodine; and Metformin and related    SOCIAL Hx:  Social History     Socioeconomic History    Marital status:      Spouse name: Not on file    Number of children: Not on file    Years of education: Not on file    Highest education level: Not on file   Occupational History    Not on file   Social Needs    Financial resource strain: Not on file    Food insecurity     Worry: Not on file     Inability: Not on file    Transportation needs     Medical: Not on file     Non-medical: Not on file   Tobacco Use    Smoking status: Never Smoker    Smokeless tobacco: Never Used   Substance and Sexual Activity    Alcohol use: No     Comment: 1 cup coffee daily    Drug use: No    Sexual activity: Never   Lifestyle    Physical activity     Days per week: Not on file     Minutes per session: Not on file    Stress: Not on file   Relationships    Social connections     Talks on phone: Not on file     Gets together: Not on file     Attends Church service: Not on file     Active member of club or organization: Not on file     Attends meetings of clubs or organizations: Not on file     Relationship status: Not on file    Intimate partner violence     Fear of current or ex partner: Not on file     Emotionally abused: Not on file     Physically abused: Not on file     Forced sexual activity: Not on file   Other Topics Concern    Not on file   Social History Narrative    Not on file       ROS: 12 point review of symptoms was conducted, pertinent positives and negative were reviewed, aside from that all 12 systems were reviewed and negative. PHYSICAL EXAM:  VITALS:  Vitals:    08/05/20 0145   BP: (!) 180/96   Pulse:    Resp: 18   Temp: 98.2 °F (36.8 °C)   SpO2: 98%     CONSTITUTIONAL:    Awake, alert, cooperative, no apparent distress, and appears stated age    EYES:    PERRL, EOMI, sclera clear without icterus, conjunctiva normal    ENT:    Normocephalic, atraumatic, sinuses nontender on palpation. External ears without lesions. Oral pharynx with moist mucus membranes. Tonsils without erythema or exudates. NECK:    Supple, symmetrical, trachea midline, no adenopathy, thyroid symmetric, not enlarged and no tenderness, skin normal, no bruits, no JVD    HEMATOLOGIC/LYMPHATICS:    No cervical lymphadenopathy and no supraclavicular lymphadenopathy    LUNGS:    Symmetric.  No increased work of breathing, diminished air exchange, clear to auscultation bilaterally, no wheezes, rhonchi, or rales,     CARDIOVASCULAR:    Normal apical impulse, regular rate and rhythm, normal S1 and S2, no S3 or S4, and no murmur has been consulted and troponins are being cycled. I have spoken directly with the physician assistant and I am in agreement that we need to exclude COVID-19 due to the patient's potential exposure and symptoms being rather atypical.  Respiratory support will be instituted will await results of infectious evaluation. Underlying comorbidities will be addressed during hospitalization as well. Treatment plan will largely depend on the outcome of the testing. See additional orders for details.     PEPE Crockett  8:52 AM  8/5/2020    Electronically signed by ADA Crockett CNP on 8/5/20 at 8:52 AM EDT

## 2020-08-05 NOTE — CONSULTS
Inpatient Cardiology Consultation      Reason for Consult: Chest pain    Consulting Physician: Dr. Jitendra Anderson    Requesting Physician: Dr. Rios Blackmon    Date of Consultation: 8/5/2020    HISTORY OF PRESENT ILLNESS:   Patient is a pleasant 78year-old WM who follows with Dr. Consuelo Rodriguez outpatient. He is being seen in consultation this admission by Dr. Jitendra Anderson for evaluation and recommendations regarding chest discomfort. Patient has a past medical history of diabetes mellitus type II, fibromyalgia, chronic fatigue syndrome, carotid artery disease, obesity, hypertension, hyperlipidemia, deafness in the right ear, gastroesophageal reflux disease, BPH, LV dysfunction on echocardiogram in January 2020, coronary artery disease status-post PCI and ZACARIAS placement to the mid to distal circumflex artery in 2015. Patient presents to [address] [place] [address] on August 4, 2020 with multiple complaints. However, the patient states that his main reason for hospital presentation is concern for COVID-19 infection. Yesterday, patient notes that he called his PCP yesterday due to concerns of possible COVID-19, and he was advised to come to the ED for testing and evaluation. Patient states that he has been going to [facility] where there have been multiple cases of suspected COVID-19 recently. He states the [employer] as of recently has not been congregating currently as a result. He admits to complaints of coughing and chest congestion. The cough is dry. He has been noticing this cough over the past couple of weeks, which has not improved or resolved. He admits to sore throat over the past couple of days. He notes of chills and generalized fatigue over the past week or so as well -- however, he states he has fibromyalgia and chronic fatigue syndrome, so he was unsure whether this was related to that diagnosis or a new respiratory infection.   Patient states over the past couple of weeks, he has also been noticing brief episodes of chest discomfort. The chest discomfort is located midsternally with no radiation. It is a pressure-like sensation. He notices it at least once a day, and states it is mostly related to exertion. However, he does notice it at times at rest.  He states the chest discomfort lasts only a few minutes and then resolve spontaneously. It is not related to meals, and is not worsened by deep inspiration or cough. His chest is not tender to palpation on exam.  No specific alleviating factors regarding the chest discomfort as patient states it resolved spontaneously. Patient states that when he had his MI in 2015, he had both chest and jaw pain. He states this chest discomfort is different from the chest discomfort he had with his MI and is not his anginal equivalent. However, patient does admit to rare brief episodes of jaw discomfort as well over the past couple of weeks. It is not related to the chest pain and occurs separately. The jaw pain is not aggravated or alleviated by any specific factor. It lasts minutes in duration as well and resolve spontaneously. Patient does admit with the chest discomfort that he notices episodes of diaphoresis and nausea. He has also noticed some dyspnea on exertion over the past couple weeks as well. He does not typically have dyspnea on exertion at baseline. He denies any complaints of emesis, palpitations, dizziness, near-syncope or syncope. He denies any complaints of abdominal pain, subjective fever. He denies paroxysmal nocturnal dyspnea, orthopnea or peripheral edema. Patient states that he is currently chest pain-free. He has not had any chest discomfort since admission. He still admits to dry cough, sore throat and occasional chills and generalized fatigue. Patient states he is adopted, so family history is unknown. He denies any current or former tobacco, alcohol or illicit drug use. Labs and diagnostic testing as noted below.   Patient is currently COVID-19 rule out.        Please note: past medical records were reviewed per electronic medical record (EMR) - see detailed reports under Past Medical/ Surgical History. PAST MEDICAL HISTORY:    1. Diabetes mellitus type II with diabetic neuropathy. 2. Chronic RBBB. 3. Fibromyalgia, chronic fatigue syndrome. 4. Obesity. 5. Hypertension. 6. Hyperlipidemia, on statin therapy. 7. Deafness in right ear. 8. Coronary artery disease/NSTEMI in 2015 s/p PCI and ZACARIAS placement to the mid-distal CFX. · Left heart catheterization in 2015 at [facility] Dr. Stevie Lora: LAD with 50% distal stenosis, with 80% narrowing at the apex. 80-90% stenosis of the left CFX s/p PCI of the mid and distal CFX with [place] ZACARIAS. RCA dominant with 50% narrowing seen at the [place] with a more distal ulcerated plaque seen distally prior to the bifurcation of the posterolateral and posterior descending artery. 80% narrowing seen in the posterior lateral ventricular branch. LVEF 50%. · Left heart catheterization 10/2016 per OV from Dr. Stevie Lora: Patent CFX stent, EF 60%. Mild non-obstructive disease in LAD and RCA. · Lexiscan stress test in 03/2018: No stress induced ischemia. EF 46%. · Lexiscan stress test 01/2020: No stress induced ischemia. No wall motion abnormality. EF 69%. 9. GERD. 10. LV dysfunction. Cardiomyopathy. Diastolic dysfunction. · 2D TTE 01/2020 Dr. Stevie Lora: Summary: Ejection fraction is visually estimated at 45%. Trileaflet aortic valve. Normal leaflet mobility. No aortic stenosis. No aortic regurgitation. Mild left ventricular concentric hypertrophy noted. Ejection fraction is visually estimated at 45%. E/A flow reversal noted. Suggestive of diastolic dysfunction. Mildly dilated aortic root. Physiologic and/or trace mitral regurgitation is present. No evidence for hemodynamically significant pericardial effusion. Normal right atrium. Mildly, Moderately dilated right ventricle. RVSP is 13 mmHg.  Physiologic and/or trace tricuspid mouth 4 times daily    Yes Historical Provider, MD   metoprolol (LOPRESSOR) 100 MG tablet Take 100 mg by mouth 2 times daily   Yes Historical Provider, MD   insulin 70-30 (HUMULIN 70/30) (70-30) 100 UNIT per ML injection vial Inject 28 Units into the skin every morning    Yes Historical Provider, MD   insulin glargine (LANTUS) 100 UNIT/ML injection vial Inject 50 Units into the skin 2 times daily   Yes Historical Provider, MD   MELATONIN ER PO Take 20 mg by mouth daily    Yes Historical Provider, MD   zinc gluconate 50 MG tablet Take 50 mg by mouth daily   Yes Historical Provider, MD   magnesium oxide (MAG-OX) 400 MG tablet Take 400 mg by mouth daily   Yes Historical Provider, MD   clopidogrel (PLAVIX) 75 MG tablet Take 75 mg by mouth daily  3/27/17  Yes Historical Provider, MD   rosuvastatin (CRESTOR) 40 MG tablet 40 mg daily  4/28/17  Yes Historical Provider, MD   Ascorbic Acid (VITAMIN C) 250 MG tablet Take 1,000 mg by mouth daily   Yes Historical Provider, MD   aspirin 81 MG tablet Take 81 mg by mouth daily. Yes Historical Provider, MD   sitaGLIPtan (JANUVIA) 100 MG tablet Take 100 mg by mouth daily.    Yes Historical Provider, MD       CURRENT MEDICATIONS:      Current Facility-Administered Medications:     amLODIPine (NORVASC) tablet 5 mg, 5 mg, Oral, BID, [facility], DO, 5 mg at 08/04/20 2257    vitamin C (ASCORBIC ACID) tablet 1,000 mg, 1,000 mg, Oral, Daily, [facility],     aspirin chewable tablet 81 mg, 81 mg, Oral, Daily, [facility],     clopidogrel (PLAVIX) tablet 75 mg, 75 mg, Oral, Daily, [facility],     glipiZIDE (GLUCOTROL) tablet 10 mg, 10 mg, Oral, BID AC, [facility],     isosorbide mononitrate (IMDUR) extended release tablet 30 mg, 30 mg, Oral, Daily, [facility],     losartan (COZAAR) tablet 50 mg, 50 mg, Oral, Daily, [facility],     magnesium oxide (MAG-OX) tablet 400 mg, 400 mg, Oral, Daily, [facility],     metoprolol tartrate (LOPRESSOR) tablet 100 mg, 100 mg, Oral, BID, Sarah Platts, DO, 100 mg at 08/04/20 2257    rosuvastatin (CRESTOR) tablet 40 mg, 40 mg, Oral, Nightly, Sarah Platts, DO, 40 mg at 08/04/20 2301    zinc gluconate tablet 50 mg, 50 mg, Oral, Daily, Sarah Platts, DO    glucose (GLUTOSE) 40 % oral gel 15 g, 15 g, Oral, PRN, Sarah Platts, DO    dextrose 50 % IV solution, 12.5 g, Intravenous, PRN, Sarah Platts, DO    glucagon (rDNA) injection 1 mg, 1 mg, Intramuscular, PRN, Sarah Platts, DO    dextrose 5 % solution, 100 mL/hr, Intravenous, PRN, Sarah Platts, DO    insulin lispro (HUMALOG) injection vial 0-6 Units, 0-6 Units, Subcutaneous, TID WC, Sarah Platts, DO    insulin lispro (HUMALOG) injection vial 0-3 Units, 0-3 Units, Subcutaneous, Nightly, Sarah Platts, DO, 1 Units at 08/04/20 2300    ondansetron (ZOFRAN) injection 4 mg, 4 mg, Intravenous, Q6H PRN, Sarah Platts, DO, 4 mg at 08/05/20 9602    enoxaparin (LOVENOX) injection 40 mg, 40 mg, Subcutaneous, Daily, Sarah Platts, DO    alogliptin (NESINA) tablet 25 mg, 25 mg, Oral, Daily, Sarah Platts, DO    melatonin tablet 15 mg, 15 mg, Oral, Daily, Sarah Platts, DO      ALLERGIES:  Poison ivy extract; Iodine; and Metformin and related    SOCIAL HISTORY:    He denies any current or former tobacco, alcohol or illicit drug use. FAMILY HISTORY:   Patient states he is adopted, so family history is unknown. REVIEW OF SYSTEMS:     · Constitutional: +chills, +generalized fatigue. Denies fevers, night sweats. Denies significant weight loss or weight gain. · HEENT: +sore throat. Denies headaches, nose bleeds, rhinorrhea. Denies blurred vision. Denies dysphagia, odynophagia. · Musculoskeletal: Denies falls, pain to BLE with ambulation. Denies muscle weakness. · Neurological: Denies dizziness and lightheadedness, numbness and tingling. Denies focal neurological deficits. · Cardiovascular: +chest pain, +diaphoresis. Denies palpitations, syncope. Denies PND, orthopnea, peripheral edema.   · Respiratory: [address] [place] [address] filed at 8/5/2020 0739  Gross per 24 hour   Intake 0 ml   Output --   Net 0 ml       Labs:   CBC:   Recent Labs     08/04/20  1659 08/05/20  0419   WBC 6.8 8.3   HGB 13.8 14.2   HCT 40.4 43.6    166     BMP:   Recent Labs     08/04/20  1659 08/05/20  0419    139   K 4.5 3.7   CO2 25 25   BUN 10 9   CREATININE 0.8 0.7   LABGLOM >60 >60   CALCIUM 8.7 8.8     Mag:   Recent Labs     08/05/20  0419   MG 1.8     Phos:   Recent Labs     08/05/20  0419   PHOS 2.8     TSH:   Recent Labs     08/05/20  0419   TSH 1.460     HgA1c:   Lab Results   Component Value Date    LABA1C 6.8 (H) 03/11/2014     CARDIAC ENZYMES:  Recent Labs     08/04/20  1659 08/04/20 2238 08/05/20 0419   TROPONINI <0.01 <0.01 <0.01     FASTING LIPID PANEL:  Lab Results   Component Value Date    CHOL 129 08/05/2020    HDL 44 08/05/2020    LDLCALC 41 08/05/2020    TRIG 222 08/05/2020       ASSESSMENT:  1. Chest pain/jaw pain: Currently chest/jaw pain free. Troponin negative x 3. No acute ST changes on EKG. Normal pharmacologic stress testing in January 2020. Will discuss need for ischemic evaluation on COVID-19 testing results. 2. Upper respiratory symptoms: Patient is currently COVID-19 rule out. Chest CT pending. 3. CAD s/p PCI and ZACARIAS placement to CFX in 2015: Continue ASA, statin and beta-blocker therapy. Continue Plavix for now. 4. Hyperlipidemia: Continue statin. 5. Hypertension: Uncontrolled on admission, will adjust medications accordingly. 6. LV dysfunction/?cardiomyopathy: TTE 01/2020 revealing EF of 45%. On losartan and BB therapy. No signs of acute HF. Will re-evaluate LV function this admission. 7. Carotid artery disease: Continue ASA and statin. 8. Obesity. 9. 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 10. Fibromyalgia, chronic fatigue syndrome. 11. GERD. RECOMMENDATIONS:  1. TTE once COVID-19 testing results for re-evaluation of LV function and dyspnea on exertion.   2. Increase losartan to 100 mg daily for attempt at better hypertension control. 3. Consider titration of Norvasc to 10 mg daily as well if BP remains uncontrolled. 4. Will discuss need for stress testing versus alternative cardiac evaluation once COVID-19 testing results. 5. Will titrate Imdur to 60 mg daily. 6. Further recommendations as per Dr. Linda Tucker. The above case and recommendations have been discussed with Dr. Linda Tucker. Valeri Sandy, [address] [place] [address], [place] [id number] Cardiology    Electronically signed by Alethea Paredes PA-C on 8/5/2020 at 7:51 AM     Addendum:  Juan Melgar MD    Reason for consult: Chest pain    Patient previously known to: Dr. Swain    History of Present illness: 78 yr pt with history of CAD with PCI and ZACARIAS placement to the mid to distal circumflex artery in 2015. , diabetes mellitus type II, fibromyalgia, chronic fatigue syndrome, carotid artery disease, obesity, hypertension, hyperlipidemia, deafness in the right ear, gastroesophageal reflux disease, BPH, LV dysfunction on echocardiogram in January 2020, Patient presents to [address] [place] [address] on August 4, 2020 with multiple complaints. However, the patient states that his main reason for hospital presentation is concern for COVID-19 infection. Yesterday, patient notes that he called his PCP yesterday due to concerns of possible COVID-19, and he was advised to come to the ED for testing and evaluation. Patient states that he has been going to [facility] where there have been multiple cases of suspected COVID-19 recently. He states the [facility] as of recently has not been congregating currently as a result. He admits to complaints of coughing and chest congestion. The cough is dry. He has been noticing this cough over the past couple of weeks, which has not improved or resolved. He admits to sore throat over the past couple of days. Cardiology consulted for chest pain; He denies any exertional CP;  No PND or orthopnea, Having nausea. Compliant with his medications. Review of systems:  Review of 10 systems negative except as mentioned in the HPI    Medical History: Reviewed    Surgical history: Reviewed    FamilyHistory: Reviewed    Allergies:  Reviewed    Social Hx: Reviewed. Medications: reviewed. Labs/imaging studies: Reviewed. Above JR exam, assessment reviewed and reflect my work. I personally saw, examined, and evaluated the patient today. I personally reviewed the medications, rhythm strips, pertinent labs and test reports. I directly participated in the medical-decision making, ordering pertinent tests and medication adjustment. Physical exam:       Vitals:    08/05/20 0800   BP: 157/87   Pulse: 69   Resp: 16   Temp: 98 °F (36.7 °C)   SpO2: 98%         In general, this is a well developed, well nourished who appears stated age. awake, alert, cooperative, no apparent distress    HEENT: eyes -conjunctivae pink,   Neck-  no stridor, no carotid bruit. no jugular venous distention   RESPIRATORY: Chest symmetrical and non-tender to palpation. No accessory muscles use. Lung auscultation - clear to auscultation except few rhonchi  CARDIOVASCULAR:     Heart Inspection shows no noted pulsations  Heart Palpation - no palpable thrills  Heart Ausculation - Regular rate and rhythm, 1/6 systolic murmur, No s3 or rub. No lower extremity edema, no varicosities. Distal pulses palpable, no clubbing or cyanosis   ABDOMEN: Soft, nontender,  Bowel sounds present. MS: n/a. : Deferred  Rectal Exam: Deferred  SKIN: warm and dry  NEURO / PSYCH: oriented to person, place        Impression/Recommendations:      ASSESSMENT/PLAN:    1. Chest pain/jaw pain: Atypical ,MI ruled out, Currently chest/jaw pain free. No acute ST changes on EKG. Normal pharmacologic stress testing in January 2020. Can have out-pt stress test if recurrent exertional chest pain. 2. Upper respiratory symptoms: Chest CT pending.

## 2020-08-05 NOTE — PROGRESS NOTES
Dr Edgard Piper made aware that patient is complaining of left sided chest pain radiating down left arm along with nausea. Monitor NSR. Will get EKG as ordered.

## 2020-08-05 NOTE — PLAN OF CARE
Problem: Pain:  Goal: Patient's pain/discomfort is manageable  Description: Patient's pain/discomfort is manageable  Outcome: Met This Shift     Problem: Discharge Planning:  Goal: Patients continuum of care needs are met  Description: Patients continuum of care needs are met  Outcome: Met This Shift

## 2020-08-05 NOTE — PLAN OF CARE
Problem: Pain:  Goal: Patient's pain/discomfort is manageable  Description: Patient's pain/discomfort is manageable  8/5/2020 0604 by Shira Walker RN  Outcome: Met This Shift     Problem: Cardiac:  Goal: Cardiovascular alteration will improve  Description: Cardiovascular alteration will improve  Outcome: Met This Shift

## 2020-08-05 NOTE — PLAN OF CARE
Problem: Pain:  Goal: Patient's pain/discomfort is manageable  Description: Patient's pain/discomfort is manageable  8/5/2020 1726 by Sj Roe RN  Outcome: Met This Shift     Problem: Discharge Planning:  Goal: Patients continuum of care needs are met  Description: Patients continuum of care needs are met  8/5/2020 1726 by Sj Roe RN  Outcome: Met This Shift     Problem: Cardiac:  Goal: Ability to maintain vital signs within normal range will improve  Description: Ability to maintain vital signs within normal range will improve  8/5/2020 1726 by Sj Roe RN  Outcome: Met This Shift

## 2020-08-06 ENCOUNTER — ANESTHESIA EVENT (OUTPATIENT)
Dept: ENDOSCOPY | Age: 70
DRG: 313 | End: 2020-08-06
Payer: MEDICARE

## 2020-08-06 ENCOUNTER — APPOINTMENT (OUTPATIENT)
Dept: MRI IMAGING | Age: 70
DRG: 313 | End: 2020-08-06
Payer: MEDICARE

## 2020-08-06 LAB
ALBUMIN SERPL-MCNC: 3.8 G/DL (ref 3.5–5.2)
ALP BLD-CCNC: 70 U/L (ref 40–129)
ALT SERPL-CCNC: 16 U/L (ref 0–40)
AMYLASE: 22 U/L (ref 20–100)
ANION GAP SERPL CALCULATED.3IONS-SCNC: 11 MMOL/L (ref 7–16)
APTT: 34.2 SEC (ref 24.5–35.1)
AST SERPL-CCNC: 13 U/L (ref 0–39)
BASOPHILS ABSOLUTE: 0.05 E9/L (ref 0–0.2)
BASOPHILS RELATIVE PERCENT: 0.6 % (ref 0–2)
BILIRUB SERPL-MCNC: 0.7 MG/DL (ref 0–1.2)
BILIRUBIN DIRECT: <0.2 MG/DL (ref 0–0.3)
BILIRUBIN, INDIRECT: NORMAL MG/DL (ref 0–1)
BUN BLDV-MCNC: 15 MG/DL (ref 8–23)
CALCIUM SERPL-MCNC: 9 MG/DL (ref 8.6–10.2)
CHLORIDE BLD-SCNC: 101 MMOL/L (ref 98–107)
CO2: 26 MMOL/L (ref 22–29)
CREAT SERPL-MCNC: 0.8 MG/DL (ref 0.7–1.2)
EKG ATRIAL RATE: 66 BPM
EKG ATRIAL RATE: 67 BPM
EKG P AXIS: 38 DEGREES
EKG P-R INTERVAL: 158 MS
EKG P-R INTERVAL: 174 MS
EKG Q-T INTERVAL: 418 MS
EKG Q-T INTERVAL: 430 MS
EKG QRS DURATION: 120 MS
EKG QRS DURATION: 124 MS
EKG QTC CALCULATION (BAZETT): 441 MS
EKG QTC CALCULATION (BAZETT): 450 MS
EKG R AXIS: -13 DEGREES
EKG R AXIS: -171 DEGREES
EKG T AXIS: 153 DEGREES
EKG T AXIS: 21 DEGREES
EKG VENTRICULAR RATE: 66 BPM
EKG VENTRICULAR RATE: 67 BPM
EOSINOPHILS ABSOLUTE: 0.16 E9/L (ref 0.05–0.5)
EOSINOPHILS RELATIVE PERCENT: 1.8 % (ref 0–6)
GFR AFRICAN AMERICAN: >60
GFR NON-AFRICAN AMERICAN: >60 ML/MIN/1.73
GLUCOSE BLD-MCNC: 223 MG/DL (ref 74–99)
HCT VFR BLD CALC: 44.5 % (ref 37–54)
HEMOGLOBIN: 14.6 G/DL (ref 12.5–16.5)
IMMATURE GRANULOCYTES #: 0.03 E9/L
IMMATURE GRANULOCYTES %: 0.3 % (ref 0–5)
INR BLD: 1.1
L. PNEUMOPHILA SEROGP 1 UR AG: NORMAL
LIPASE: 4 U/L (ref 13–60)
LYMPHOCYTES ABSOLUTE: 1.68 E9/L (ref 1.5–4)
LYMPHOCYTES RELATIVE PERCENT: 18.5 % (ref 20–42)
MCH RBC QN AUTO: 31.5 PG (ref 26–35)
MCHC RBC AUTO-ENTMCNC: 32.8 % (ref 32–34.5)
MCV RBC AUTO: 96.1 FL (ref 80–99.9)
METER GLUCOSE: 162 MG/DL (ref 74–99)
METER GLUCOSE: 172 MG/DL (ref 74–99)
METER GLUCOSE: 174 MG/DL (ref 74–99)
METER GLUCOSE: 200 MG/DL (ref 74–99)
MONOCYTES ABSOLUTE: 0.64 E9/L (ref 0.1–0.95)
MONOCYTES RELATIVE PERCENT: 7.1 % (ref 2–12)
NEUTROPHILS ABSOLUTE: 6.51 E9/L (ref 1.8–7.3)
NEUTROPHILS RELATIVE PERCENT: 71.7 % (ref 43–80)
PDW BLD-RTO: 13.2 FL (ref 11.5–15)
PLATELET # BLD: 171 E9/L (ref 130–450)
PMV BLD AUTO: 10.4 FL (ref 7–12)
POTASSIUM SERPL-SCNC: 4 MMOL/L (ref 3.5–5)
PROTHROMBIN TIME: 12.7 SEC (ref 9.3–12.4)
RBC # BLD: 4.63 E12/L (ref 3.8–5.8)
SODIUM BLD-SCNC: 138 MMOL/L (ref 132–146)
STREP PNEUMONIAE ANTIGEN, URINE: NORMAL
TOTAL PROTEIN: 7.1 G/DL (ref 6.4–8.3)
WBC # BLD: 9.1 E9/L (ref 4.5–11.5)

## 2020-08-06 PROCEDURE — 96372 THER/PROPH/DIAG INJ SC/IM: CPT

## 2020-08-06 PROCEDURE — 6370000000 HC RX 637 (ALT 250 FOR IP): Performed by: NURSE PRACTITIONER

## 2020-08-06 PROCEDURE — 96375 TX/PRO/DX INJ NEW DRUG ADDON: CPT

## 2020-08-06 PROCEDURE — 6370000000 HC RX 637 (ALT 250 FOR IP): Performed by: INTERNAL MEDICINE

## 2020-08-06 PROCEDURE — 83690 ASSAY OF LIPASE: CPT

## 2020-08-06 PROCEDURE — 94669 MECHANICAL CHEST WALL OSCILL: CPT

## 2020-08-06 PROCEDURE — 82962 GLUCOSE BLOOD TEST: CPT

## 2020-08-06 PROCEDURE — 82150 ASSAY OF AMYLASE: CPT

## 2020-08-06 PROCEDURE — 85730 THROMBOPLASTIN TIME PARTIAL: CPT

## 2020-08-06 PROCEDURE — 94640 AIRWAY INHALATION TREATMENT: CPT

## 2020-08-06 PROCEDURE — 6360000002 HC RX W HCPCS: Performed by: INTERNAL MEDICINE

## 2020-08-06 PROCEDURE — 6360000002 HC RX W HCPCS: Performed by: NURSE PRACTITIONER

## 2020-08-06 PROCEDURE — 80048 BASIC METABOLIC PNL TOTAL CA: CPT

## 2020-08-06 PROCEDURE — G0378 HOSPITAL OBSERVATION PER HR: HCPCS

## 2020-08-06 PROCEDURE — C9113 INJ PANTOPRAZOLE SODIUM, VIA: HCPCS | Performed by: NURSE PRACTITIONER

## 2020-08-06 PROCEDURE — 6360000004 HC RX CONTRAST MEDICATION: Performed by: RADIOLOGY

## 2020-08-06 PROCEDURE — 85610 PROTHROMBIN TIME: CPT

## 2020-08-06 PROCEDURE — APPSS45 APP SPLIT SHARED TIME 31-45 MINUTES: Performed by: PHYSICIAN ASSISTANT

## 2020-08-06 PROCEDURE — 85025 COMPLETE CBC W/AUTO DIFF WBC: CPT

## 2020-08-06 PROCEDURE — 74183 MRI ABD W/O CNTR FLWD CNTR: CPT

## 2020-08-06 PROCEDURE — 6370000000 HC RX 637 (ALT 250 FOR IP): Performed by: PHYSICIAN ASSISTANT

## 2020-08-06 PROCEDURE — 36415 COLL VENOUS BLD VENIPUNCTURE: CPT

## 2020-08-06 PROCEDURE — 96376 TX/PRO/DX INJ SAME DRUG ADON: CPT

## 2020-08-06 PROCEDURE — 80076 HEPATIC FUNCTION PANEL: CPT

## 2020-08-06 PROCEDURE — A9577 INJ MULTIHANCE: HCPCS | Performed by: RADIOLOGY

## 2020-08-06 RX ORDER — PANTOPRAZOLE SODIUM 40 MG/10ML
40 INJECTION, POWDER, LYOPHILIZED, FOR SOLUTION INTRAVENOUS DAILY
Status: DISCONTINUED | OUTPATIENT
Start: 2020-08-06 | End: 2020-08-07

## 2020-08-06 RX ORDER — MORPHINE SULFATE 2 MG/ML
2 INJECTION, SOLUTION INTRAMUSCULAR; INTRAVENOUS EVERY 4 HOURS PRN
Status: DISCONTINUED | OUTPATIENT
Start: 2020-08-06 | End: 2020-08-07 | Stop reason: HOSPADM

## 2020-08-06 RX ORDER — ACETAMINOPHEN 325 MG/1
650 TABLET ORAL EVERY 4 HOURS PRN
Status: DISCONTINUED | OUTPATIENT
Start: 2020-08-06 | End: 2020-08-07 | Stop reason: HOSPADM

## 2020-08-06 RX ADMIN — BUDESONIDE 500 MCG: 0.5 INHALANT RESPIRATORY (INHALATION) at 06:08

## 2020-08-06 RX ADMIN — MORPHINE SULFATE 2 MG: 2 INJECTION, SOLUTION INTRAMUSCULAR; INTRAVENOUS at 13:09

## 2020-08-06 RX ADMIN — ONDANSETRON 4 MG: 2 INJECTION INTRAMUSCULAR; INTRAVENOUS at 20:52

## 2020-08-06 RX ADMIN — MORPHINE SULFATE 2 MG: 2 INJECTION, SOLUTION INTRAMUSCULAR; INTRAVENOUS at 09:07

## 2020-08-06 RX ADMIN — LOSARTAN POTASSIUM 100 MG: 50 TABLET, FILM COATED ORAL at 09:03

## 2020-08-06 RX ADMIN — Medication 50 MG: at 09:03

## 2020-08-06 RX ADMIN — INSULIN LISPRO 1 UNITS: 100 INJECTION, SOLUTION INTRAVENOUS; SUBCUTANEOUS at 21:05

## 2020-08-06 RX ADMIN — AMLODIPINE BESYLATE 5 MG: 5 TABLET ORAL at 09:03

## 2020-08-06 RX ADMIN — INSULIN LISPRO 2 UNITS: 100 INJECTION, SOLUTION INTRAVENOUS; SUBCUTANEOUS at 09:08

## 2020-08-06 RX ADMIN — MORPHINE SULFATE 2 MG: 2 INJECTION, SOLUTION INTRAMUSCULAR; INTRAVENOUS at 21:07

## 2020-08-06 RX ADMIN — MORPHINE SULFATE 2 MG: 2 INJECTION, SOLUTION INTRAMUSCULAR; INTRAVENOUS at 16:28

## 2020-08-06 RX ADMIN — BENZONATATE 100 MG: 100 CAPSULE ORAL at 01:39

## 2020-08-06 RX ADMIN — GLIPIZIDE 10 MG: 5 TABLET ORAL at 06:00

## 2020-08-06 RX ADMIN — METOPROLOL TARTRATE 100 MG: 50 TABLET, FILM COATED ORAL at 09:01

## 2020-08-06 RX ADMIN — ACETAMINOPHEN 650 MG: 325 TABLET, FILM COATED ORAL at 00:32

## 2020-08-06 RX ADMIN — ONDANSETRON 4 MG: 2 INJECTION INTRAMUSCULAR; INTRAVENOUS at 05:58

## 2020-08-06 RX ADMIN — GADOBENATE DIMEGLUMINE 20 ML: 529 INJECTION, SOLUTION INTRAVENOUS at 16:12

## 2020-08-06 RX ADMIN — ALOGLIPTIN 25 MG: 25 TABLET, FILM COATED ORAL at 09:02

## 2020-08-06 RX ADMIN — CLOPIDOGREL BISULFATE 75 MG: 75 TABLET ORAL at 09:02

## 2020-08-06 RX ADMIN — ONDANSETRON 4 MG: 2 INJECTION INTRAMUSCULAR; INTRAVENOUS at 12:30

## 2020-08-06 RX ADMIN — PANTOPRAZOLE SODIUM 40 MG: 40 INJECTION, POWDER, FOR SOLUTION INTRAVENOUS at 09:03

## 2020-08-06 RX ADMIN — OXYCODONE HYDROCHLORIDE AND ACETAMINOPHEN 1000 MG: 500 TABLET ORAL at 09:02

## 2020-08-06 RX ADMIN — ASPIRIN 81 MG 81 MG: 81 TABLET ORAL at 09:04

## 2020-08-06 RX ADMIN — ARFORMOTEROL TARTRATE 15 MCG: 15 SOLUTION RESPIRATORY (INHALATION) at 06:08

## 2020-08-06 RX ADMIN — ISOSORBIDE MONONITRATE 60 MG: 60 TABLET, EXTENDED RELEASE ORAL at 09:02

## 2020-08-06 RX ADMIN — ENOXAPARIN SODIUM 40 MG: 40 INJECTION SUBCUTANEOUS at 09:03

## 2020-08-06 RX ADMIN — Medication 400 MG: at 09:02

## 2020-08-06 ASSESSMENT — PAIN DESCRIPTION - PAIN TYPE
TYPE: ACUTE PAIN
TYPE: ACUTE PAIN

## 2020-08-06 ASSESSMENT — PAIN DESCRIPTION - DESCRIPTORS: DESCRIPTORS: ACHING;DISCOMFORT

## 2020-08-06 ASSESSMENT — PAIN SCALES - GENERAL
PAINLEVEL_OUTOF10: 7
PAINLEVEL_OUTOF10: 9
PAINLEVEL_OUTOF10: 5
PAINLEVEL_OUTOF10: 5
PAINLEVEL_OUTOF10: 3
PAINLEVEL_OUTOF10: 0

## 2020-08-06 ASSESSMENT — PAIN DESCRIPTION - LOCATION
LOCATION: NECK
LOCATION: NECK

## 2020-08-06 ASSESSMENT — PAIN DESCRIPTION - ORIENTATION: ORIENTATION: POSTERIOR

## 2020-08-06 ASSESSMENT — PAIN - FUNCTIONAL ASSESSMENT: PAIN_FUNCTIONAL_ASSESSMENT: ACTIVITIES ARE NOT PREVENTED

## 2020-08-06 NOTE — CONSULTS
Nya Martínez M.D. [facility]  Dr. Dandre Gustafson M.D.,  Dr. Mirian Espitia M.D.,  Dr. Kyle Mayer D.O.,  Dr Nicole Guillen D.O. ,  Dr Jose Driscoll M.D. Christ Hashimoto  78 y.o.  male      Re: Dyspepsia  Requesting physician: Dr. Nicole Win  Date:12:41 PM 8/6/2020          HPI: Mr. Allie Ramírez is a 48-year-old old patient seen in the hospital for above-described issues of chest pain and dyspepsia. Patient reports that he feels nauseated and has dry heaves but no emesis. He denies hematemesis emesis of coffee-ground material.  He initially presented yesterday with pain which he localizes in the mid chest substernally and to the left without radiation. Patient denies dysphagia odynophagia. He denies weight loss. He denies diarrhea and reports that his last bowel movement was on Tuesday. He denies blood in the stool or melena. He reports colonoscopy with Dr. Sanna Rodríguez 2 to 3 years ago which according to the patient was unremarkable -I have offered for him to continue following with Dr. Sanna Rodríguez, however patient states that he would rather follow with our group. Upon presentation cardiology consultation was obtained however apparently has been an issue and patient has not undergone stress test secondary to GI complaints. On presentation he was noted to have unremarkable CBC without significant anemia hemoglobin of 14.6 and hematocrit of 44.5. No leukocytosis or platelet abnormalities are noted as well. Chemistry panel reveals unremarkable chemistry panel today including unremarkable renal function as well as unremarkable liver profile with nonelevated transaminases, nonelevated alkaline phosphatase and nonelevated bilirubin. CT scan of the chest performed without contrast on 4 August reveals clear lungs and findings consistent with chronic pancreatitis. Patient denies any significant alcohol or tobacco abuse.   Nonelevated troponin in the hospital and mildly abnormal lipid 6597    insulin lispro (HUMALOG) injection vial 0-3 Units  0-3 Units Subcutaneous Nightly Evi Saleem DO   1 Units at 08/05/20 2133    ondansetron (ZOFRAN) injection 4 mg  4 mg Intravenous Q6H PRN Evi Saleem DO   4 mg at 08/06/20 1230    enoxaparin (LOVENOX) injection 40 mg  40 mg Subcutaneous Daily Evi Saleem DO   40 mg at 08/06/20 9588    alogliptin (NESINA) tablet 25 mg  25 mg Oral Daily Evi Saleem DO   25 mg at 08/06/20 0902    melatonin tablet 15 mg  15 mg Oral Daily Evi Saleem DO   15 mg at 08/05/20 2127       SocHx:  Social History     Socioeconomic History    Marital status:      Spouse name: Not on file    Number of children: Not on file    Years of education: Not on file    Highest education level: Not on file   Occupational History    Not on file   Social Needs    Financial resource strain: Not on file    Food insecurity     Worry: Not on file     Inability: Not on file   [employer] needs     Medical: Not on file     Non-medical: Not on file   Tobacco Use    Smoking status: Never Smoker    Smokeless tobacco: Never Used   Substance and Sexual Activity    Alcohol use: No     Comment: 1 cup coffee daily    Drug use: No    Sexual activity: Never   Lifestyle    Physical activity     Days per week: Not on file     Minutes per session: Not on file    Stress: Not on file   Relationships    Social connections     Talks on phone: Not on file     Gets together: Not on file     Attends [employer] service: Not on file     Active member of club or organization: Not on file     Attends meetings of clubs or organizations: Not on file     Relationship status: Not on file    Intimate partner violence     Fear of current or ex partner: Not on file     Emotionally abused: Not on file     Physically abused: Not on file     Forced sexual activity: Not on file   Other Topics Concern    Not on file   Social History Narrative    Not on file       FamHx:  Family History   Adopted:  Yes Family history unknown: Yes       Allergy:  Allergies   Allergen Reactions    Poison Ivy Extract      rash    Iodine Hives    Metformin And Related          ROS: As described in the HPI and in addition is negative upon detailed review of systems or unobtainable unless otherwise stated in this dictation. PE:  BP (!) 160/90   Pulse 73   Temp 97.9 °F (36.6 °C) (Oral)   Resp 18   Ht 6' 1\" (1.854 m)   Wt 265 lb (120.2 kg)   SpO2 95%   BMI 34.96 kg/m²     Gen.: NAD/obese  male  Head: Atraumatic/normocephalic  Eyes: EOMI/anicteric sclera  ENT: Moist oral mucosa/no discharge from nose or ears  Neck: Supple/trachea is midline  Chest: CTA B/symmetrical excursions  Cor: Regular/S1-S2  Abd.: Soft and obese. Appears nontender. BS +/4  Extr.:  No significant peripheral edema  Muscles:Decreased muscle tone and bulk throughout   skin: Warm and dry/anicteric        DATA:     Lab Results   Component Value Date    WBC 9.1 08/06/2020    RBC 4.63 08/06/2020    HGB 14.6 08/06/2020    HCT 44.5 08/06/2020    MCV 96.1 08/06/2020    MCH 31.5 08/06/2020    MCHC 32.8 08/06/2020    RDW 13.2 08/06/2020     08/06/2020    MPV 10.4 08/06/2020     Lab Results   Component Value Date     08/06/2020    K 4.0 08/06/2020     08/06/2020    CO2 26 08/06/2020    BUN 15 08/06/2020    CREATININE 0.8 08/06/2020    CALCIUM 9.0 08/06/2020    PROT 7.1 08/06/2020    LABALBU 3.8 08/06/2020    BILITOT 0.7 08/06/2020    ALKPHOS 70 08/06/2020    AST 13 08/06/2020    ALT 16 08/06/2020     Lab Results   Component Value Date    LIPASE 4 08/06/2020     Lab Results   Component Value Date    AMYLASE 22 08/06/2020         ASSESSMENT/PLAN:  1.   Chest pain  -Cardiology work-up incomplete secondary to inability to perform stress test  -Most recent 2D echo in January of this year revealing diastolic dysfunction with ejection fraction of 45%  -Stress test pharmacologic from January 2020 reveals no evidence of stress-induced

## 2020-08-06 NOTE — CARE COORDINATION
8/6/2020 1040 CM note: NEGATIVE COVID TESTING 8/5/2020. Plan is home, pt will drive self home as car is in parking lot. No needs identified.  Melissa ALVARADO

## 2020-08-06 NOTE — PROGRESS NOTES
Patient has had 2 episodes this shift of anxiety/panic attack with tachypnea and crying stating \"I don't know why this is happening to me. \" This nurse gave instruction for slowing breathing and medicated with morphine PRN as ordered with improvement. C/o being nauseated and refusing insulin or food at this time. 1730 Patient calm and resting comfortably with easy breathing noted. Stated he feels better, no further concerns voiced at this time.

## 2020-08-06 NOTE — PROGRESS NOTES
Internal Medicine Progress Note    [facility]    Anthony Moralez. Gabi Mott., LUCINA.A.TESSAOPanI. Guicho Mi D.O., MILENAOALYSSA Bucio D.O. Jacquelin Minaya, MSN, APRN, NP-C  Paloma Orr. John Marinelli, MSN, APRN-CNP     Primary Care Physician: Caitie Valle DO   Admitting Physician:  Frederick Dangelo DO  Admission date and time: 8/4/2020  3:46 PM    Room:  [facility]  Admitting diagnosis: Chest pain [R07.9]    Patient Name: Carlota Khalil  MRN: [id number]    Date of Service: 8/6/2020     Subjective:    Shadia Ahn is a 78 y.o. male who was seen and examined today,8/6/2020, at the bedside. He is no longer exhibiting respiratory complaints. He is no longer having chest pain. The cardiology team has evaluated and he is not being prepared for further cardiac evaluation. He is now complaining of epigastric discomfort and indigestion and is requesting evaluation by GI. He has previously seen Jeny Nash in the past and is offered evaluation by his established physician. He is requesting evaluation by 's group as well as treatment for the underlying pain and indigestion. He denies any known history of ulceration, regular nonsteroidal anti-inflammatory drug use, alcohol consumption on a regular basis. He states that he is previously undergone cholecystectomy. No family present during my examination. Review of System:   Constitutional:   Denies fever or chills, weight loss or gain, fatigue or malaise. HEENT:   Denies ear pain, sore throat, sinus or eye problems. Cardiovascular:   No further chest pain. Denies irregular heartbeats, or palpitations. Respiratory:   Denies shortness of breath, coughing, sputum production, hemoptysis, or wheezing. Gastrointestinal:   Admits to epigastric discomfort with indigestion. Admits to mild nausea without vomiting. Denies bowel pattern changes. Genitourinary:    Denies any urgency, frequency, hematuria.  Voiding  without artery disease with prior cardiac intervention required  · Diabetes mellitus type 2  · Hypertension  · Lipidemia  · Obesity    Plan:   Michele Bey is no longer reporting upper respiratory complaints or chest discomfort. The cardiovascular team has provided consultation and no further cardiac evaluation is planned as he underwent echo and stress testing in January which were normal.  His troponins have cycled and remained negative. His COVID testing and infectious evaluation was negative as well. He reports significant acid reflux and this may have been the trigger for his cough and congestion symptoms. He complains of epigastric discomfort as well and he will be evaluated by the GI team.  PPI therapy has been employed. Hepatic function panel, amylase and lipase will be evaluated as well as coagulation studies. Further recommendations per the GI team.  See orders for further plan of care. More than 50% of my  time was spent at the bedside counseling/coordinating care with the patient and/or family with face to face contact. This time was spent reviewing notes and laboratory data as well as instructing and counseling the patient. Time I spent with the family or surrogate(s) is included only if the patient was incapable of providing the necessary information or participating in medical decisions. I also discussed the differential diagnosis and all of the proposed management plans with the patient and individuals accompanying the patient. Mihcele Bey requires this high level of physician care and nursing on the IMC/Telemetry unit due the complexity of decision management and chance of rapid decline or death. Continued cardiac monitoring and higher level of nursing are required. I am readily available for any further decision-making and intervention.      ADA Roman - CNP  8/6/2020  9:09 AM

## 2020-08-06 NOTE — PROGRESS NOTES
Inpatient Cardiology Progress Note    Date of Service: 8/6/2020    Reason for Follow-up: Chest pain    SUBJECTIVE:     Patient seen and examined at bedside this morning. He denies any current chest discomfort, shortness of breath or anginal equivalent symptoms at this time. He denies any complaints of palpitations, dizziness, near syncope or syncope. He denies subjective fever, cough or chills today. He now states since he woke up this morning, he has indigestion, decreased appetite and nausea. He states he feels like he is going to throw up, but has not had emesis thus far. He is now requesting GI evaluation. CT of the chest yesterday revealing findings suggestive of chronic pancreatitis. No new cardiac events noted overnight. His COVID-19 test has resulted negative. HOME MEDICATIONS:  Prior to Admission medications    Medication Sig Start Date End Date Taking?  Authorizing Provider   tamsulosin (FLOMAX) 0.4 MG capsule Take 0.4 mg by mouth daily   Yes Historical Provider, MD   omeprazole (PRILOSEC) 20 MG delayed release capsule Take 20 mg by mouth daily   Yes Historical Provider, MD   Insulin Aspart Prot & Aspart (INSULIN ASP PROT & ASP FLEXPEN) (70-30) 100 UNIT/ML SUPN Inject 14 Units into the skin Daily with lunch   Yes Historical Provider, MD   Insulin Aspart Prot & Aspart (INSULIN ASP PROT & ASP FLEXPEN) (70-30) 100 UNIT/ML SUPN Inject 28 Units into the skin Daily with supper   Yes Historical Provider, MD   losartan (COZAAR) 50 MG tablet Take 50 mg by mouth daily   Yes Historical Provider, MD   amLODIPine (NORVASC) 5 MG tablet Take 5 mg by mouth daily    Yes Historical Provider, MD   glipiZIDE (GLUCOTROL) 10 MG tablet Take 10 mg by mouth 4 times daily    Yes Historical Provider, MD   metoprolol (LOPRESSOR) 100 MG tablet Take 100 mg by mouth 2 times daily   Yes Historical Provider, MD   insulin 70-30 (HUMULIN 70/30) (70-30) 100 UNIT per ML injection vial Inject 28 Units into the skin every morning Yes Historical Provider, MD   insulin glargine (LANTUS) 100 UNIT/ML injection vial Inject 50 Units into the skin 2 times daily   Yes Historical Provider, MD   MELATONIN ER PO Take 20 mg by mouth daily    Yes Historical Provider, MD   zinc gluconate 50 MG tablet Take 50 mg by mouth daily   Yes Historical Provider, MD   magnesium oxide (MAG-OX) 400 MG tablet Take 400 mg by mouth daily   Yes Historical Provider, MD   clopidogrel (PLAVIX) 75 MG tablet Take 75 mg by mouth daily  3/27/17  Yes Historical Provider, MD   rosuvastatin (CRESTOR) 40 MG tablet 40 mg daily  4/28/17  Yes Historical Provider, MD   Ascorbic Acid (VITAMIN C) 250 MG tablet Take 1,000 mg by mouth daily   Yes Historical Provider, MD   aspirin 81 MG tablet Take 81 mg by mouth daily. Yes Historical Provider, MD   sitaGLIPtan (JANUVIA) 100 MG tablet Take 100 mg by mouth daily.    Yes Historical Provider, MD       CURRENT MEDICATIONS:      Current Facility-Administered Medications:     acetaminophen (TYLENOL) tablet 650 mg, 650 mg, Oral, Q4H PRN, Alexis Herrera DO, 650 mg at 08/06/20 0032    pantoprazole (PROTONIX) injection 40 mg, 40 mg, Intravenous, Daily, ADA Huggins CNP, 40 mg at 08/06/20 0903    morphine (PF) injection 2 mg, 2 mg, Intravenous, Q4H PRN, ADA Huggins CNP, 2 mg at 08/06/20 0907    benzonatate (TESSALON) capsule 100 mg, 100 mg, Oral, TID PRN, ADA Huggins CNP, 100 mg at 08/06/20 0139    Arformoterol Tartrate (BROVANA) nebulizer solution 15 mcg, 15 mcg, Nebulization, BID, 15 mcg at 08/06/20 0608 **AND** budesonide (PULMICORT) nebulizer suspension 500 mcg, 0.5 mg, Nebulization, BID, ADA Huggins CNP, 500 mcg at 08/06/20 0608    losartan (COZAAR) tablet 100 mg, 100 mg, Oral, Daily, Rocco Peck PA-C, 100 mg at 08/06/20 9222    isosorbide mononitrate (IMDUR) extended release tablet 60 mg, 60 mg, Oral, Daily, DARLENE Pacheco-C, 60 mg at 08/06/20 0902    albuterol (PROVENTIL) nebulizer solution 2.5 mg, 2.5 mg, Nebulization, Q6H PRN, Quinton Angelucci, APRN - CNP    amLODIPine (NORVASC) tablet 5 mg, 5 mg, Oral, BID, Barbra Snipe, DO, 5 mg at 08/06/20 0903    vitamin C (ASCORBIC ACID) tablet 1,000 mg, 1,000 mg, Oral, Daily, Barbra Snipe, DO, 1,000 mg at 08/06/20 0902    [Held by provider] aspirin chewable tablet 81 mg, 81 mg, Oral, Daily, Barbra Snipe, DO, 81 mg at 08/06/20 0904    [Held by provider] clopidogrel (PLAVIX) tablet 75 mg, 75 mg, Oral, Daily, Barbra Snipe, DO, 75 mg at 08/06/20 0902    glipiZIDE (GLUCOTROL) tablet 10 mg, 10 mg, Oral, BID AC, Barbra Snipe, DO, 10 mg at 08/06/20 0600    magnesium oxide (MAG-OX) tablet 400 mg, 400 mg, Oral, Daily, Barbra Snipe, DO, 400 mg at 08/06/20 0902    metoprolol tartrate (LOPRESSOR) tablet 100 mg, 100 mg, Oral, BID, Barbra Snipe, DO, 100 mg at 08/06/20 0901    rosuvastatin (CRESTOR) tablet 40 mg, 40 mg, Oral, Nightly, Barbra Snipe, DO, 40 mg at 08/05/20 2127    zinc gluconate tablet 50 mg, 50 mg, Oral, Daily, Barbra Snipe, DO, 50 mg at 08/06/20 0903    glucose (GLUTOSE) 40 % oral gel 15 g, 15 g, Oral, PRN, Barbra Snipe, DO    dextrose 50 % IV solution, 12.5 g, Intravenous, PRN, Barbra Snipe, DO    glucagon (rDNA) injection 1 mg, 1 mg, Intramuscular, PRN, Barbra Snipe, DO    dextrose 5 % solution, 100 mL/hr, Intravenous, PRN, Barbra Snipe, DO    insulin lispro (HUMALOG) injection vial 0-6 Units, 0-6 Units, Subcutaneous, TID WC, Barbra Snipe, DO, 2 Units at 08/06/20 0908    insulin lispro (HUMALOG) injection vial 0-3 Units, 0-3 Units, Subcutaneous, Nightly, Barbra Snipe, DO, 1 Units at 08/05/20 2133    ondansetron [facility]) injection 4 mg, 4 mg, Intravenous, Q6H PRN, Barbra Snipe, DO, 4 mg at 08/06/20 0558    enoxaparin (LOVENOX) injection 40 mg, 40 mg, Subcutaneous, Daily, Barbra Snipe, DO, 40 mg at 08/06/20 7099    alogliptin (NESINA) tablet 25 mg, 25 mg, Oral, Daily, Barbra Snipe, DO, 25 mg at 08/06/20 0902    melatonin tablet 15 mg, 15 mg, Oral, Daily, Sara Mask, DO, 15 mg at 08/05/20 2127      ALLERGIES:  Poison ivy extract; Iodine; and Metformin and related        REVIEW OF SYSTEMS:     · Constitutional: Denies fevers, chills, night sweats, and generalized fatigue. · HEENT: Denies headaches, nose bleeds, rhinorrhea, sore throat. Denies blurred vision. Denies dysphagia, odynophagia. · Musculoskeletal: Denies falls, pain to BLE with ambulation. Denies muscle weakness. · Neurological: Denies dizziness and lightheadedness, numbness and tingling. Denies focal neurological deficits. · Cardiovascular: Denies current chest pain, palpitations, diaphoresis. Denies syncope. Denies PND, orthopnea, peripheral edema. · Respiratory: Denies shortness of breath. Denies cough, hemoptysis. · Gastrointestinal: +heartburn, +epigastric pain. +nausea. Denies diarrhea and constipation, black/bloody, and tarry stools. PHYSICAL EXAM:   BP (!) 160/90   Pulse 73   Temp 97.9 °F (36.6 °C) (Oral)   Resp 18   Ht 6' 1\" (1.854 m)   Wt 265 lb (120.2 kg)   SpO2 95%   BMI 34.96 kg/m²   CONST:  Well developed, obese WM who appears stated age. Awake, alert, cooperative, no apparent distress. HEENT:   Head- Normocephalic, atraumatic. Eyes- Conjunctivae pink, anicteric. Throat- Oral mucosa pink and moist.  Neck-  No stridor, trachea midline, no apparent jugular venous distention. CHEST: Chest symmetrical and non-tender to palpation. No accessory muscle use or intercostal retractions. RESPIRATORY: Lung sounds - clear throughout fields. No wheezing, rales or rhonchi. CARDIOVASCULAR:     No carotid bruit. Heart Inspection- shows no noted pulsations. Heart Palpation- no heaves or thrills; PMI is non-displaced. Heart Ausculation- Regular rate and rhythm, no apparent murmur. No s3, s4 or rub. PV: Trace bilateral lower extremity edema. No varicosities. Pedal pulses palpable, no clubbing or cyanosis. ABDOMEN: Soft, non-tender to light palpation. Bowel sounds present.    MS: testing in January 2020. COVID-19 test negative, with no acute cardiopulmonary disease but findings of chronic pancreatitis. Of note, patient also states he did not come to the hospital for chest pain evaluation but rather concern for COVID-19.  2. Upper respiratory symptoms: COVID-19 test negative. Denies recurrent URI symptoms today. 3. CAD s/p PCI and ZACARIAS placement to CFX in 2015: Continue ASA, statin and beta-blocker therapy. Continue Plavix for now. 4. Hyperlipidemia: Continue statin. 5. Hypertension: Slight improvement in blood pressure. 6. LV dysfunction/?cardiomyopathy: TTE 01/2020 revealing EF of 45%. On losartan and BB therapy. No signs of acute HF.   7. Carotid artery disease: Continue ASA and statin. 8. Obesity. 9. 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 10. Fibromyalgia, chronic fatigue syndrome. 11. GERD. RECOMMENDATIONS:  1. Due to acute issues today with severe nausea and indigestion, will not be able to proceed with stress testing. Can be done (versus alternative ischemic evaluation if deemed necessary) as an outpatient. No further chest pain with titration of Imdur yesterday. 2. Possible GI evaluation today. 3. Repeat TTE as an outpatient if deemed necessary for further evaluation of LV function. 4. Continue current cardiac medications the same at this time. Adjust antihypertensives further as needed. 5. Further recommendations as per Dr. Brandon Gonsalves. The above case and recommendations have been discussed with Dr. Brandon Gonsalves.     [facility], [address] [place] [address], [place] [id number] Cardiology    Electronically signed by Chere Homans, PA-C on 8/6/2020 at 11:55 AM

## 2020-08-06 NOTE — ANESTHESIA PRE PROCEDURE
Department of Anesthesiology  Preprocedure Note       Name:  Conor Caro   Age:  78 y.o.  :  1950                                          MRN:  [id number]         Date:  2020      Surgeon: Antonio Chavarria):  Sabrina Valenzuela MD    Procedure: Procedure(s):  EGD ESOPHAGOGASTRODUODENOSCOPY    Medications prior to admission:   Prior to Admission medications    Medication Sig Start Date End Date Taking?  Authorizing Provider   tamsulosin (FLOMAX) 0.4 MG capsule Take 0.4 mg by mouth daily   Yes Historical Provider, MD   omeprazole (PRILOSEC) 20 MG delayed release capsule Take 20 mg by mouth daily   Yes Historical Provider, MD   Insulin Aspart Prot & Aspart (INSULIN ASP PROT & ASP FLEXPEN) (70-30) 100 UNIT/ML SUPN Inject 14 Units into the skin Daily with lunch   Yes Historical Provider, MD   Insulin Aspart Prot & Aspart (INSULIN ASP PROT & ASP FLEXPEN) (70-30) 100 UNIT/ML SUPN Inject 28 Units into the skin Daily with supper   Yes Historical Provider, MD   losartan (COZAAR) 50 MG tablet Take 50 mg by mouth daily   Yes Historical Provider, MD   amLODIPine (NORVASC) 5 MG tablet Take 5 mg by mouth daily    Yes Historical Provider, MD   glipiZIDE (GLUCOTROL) 10 MG tablet Take 10 mg by mouth 4 times daily    Yes Historical Provider, MD   metoprolol (LOPRESSOR) 100 MG tablet Take 100 mg by mouth 2 times daily   Yes Historical Provider, MD   insulin 70-30 (HUMULIN 70/30) (70-30) 100 UNIT per ML injection vial Inject 28 Units into the skin every morning    Yes Historical Provider, MD   insulin glargine (LANTUS) 100 UNIT/ML injection vial Inject 50 Units into the skin 2 times daily   Yes Historical Provider, MD   MELATONIN ER PO Take 20 mg by mouth daily    Yes Historical Provider, MD   zinc gluconate 50 MG tablet Take 50 mg by mouth daily   Yes Historical Provider, MD   magnesium oxide (MAG-OX) 400 MG tablet Take 400 mg by mouth daily   Yes Historical Provider, MD   clopidogrel (PLAVIX) 75 MG tablet Take 75 mg by mouth daily 3/27/17  Yes Historical Provider, MD   rosuvastatin (CRESTOR) 40 MG tablet 40 mg daily  4/28/17  Yes Historical Provider, MD   Ascorbic Acid (VITAMIN C) 250 MG tablet Take 1,000 mg by mouth daily   Yes Historical Provider, MD   aspirin 81 MG tablet Take 81 mg by mouth daily. Yes Historical Provider, MD   sitaGLIPtan (JANUVIA) 100 MG tablet Take 100 mg by mouth daily.    Yes Historical Provider, MD       Current medications:    Current Facility-Administered Medications   Medication Dose Route Frequency Provider Last Rate Last Dose    acetaminophen (TYLENOL) tablet 650 mg  650 mg Oral Q4H PRN Mayo Mancera Bisel, DO   650 mg at 08/06/20 0032    pantoprazole (PROTONIX) injection 40 mg  40 mg Intravenous Daily ADA Fowler - CNP   40 mg at 08/06/20 0903    morphine (PF) injection 2 mg  2 mg Intravenous Q4H PRN Anabell Mchugh APRN - CNP   2 mg at 08/06/20 1309    benzonatate (TESSALON) capsule 100 mg  100 mg Oral TID PRN Anabell Mchugh, APRN - CNP   100 mg at 08/06/20 0139    Arformoterol Tartrate (BROVANA) nebulizer solution 15 mcg  15 mcg Nebulization BID Anabell Mchugh, APRN - CNP   15 mcg at 08/06/20 1150    And    budesonide (PULMICORT) nebulizer suspension 500 mcg  0.5 mg Nebulization BID Anabell Mchugh, APRN - CNP   500 mcg at 08/06/20 0955    losartan (COZAAR) tablet 100 mg  100 mg Oral Daily FAUSTINO WhitakerC   100 mg at 08/06/20 1907    isosorbide mononitrate (IMDUR) extended release tablet 60 mg  60 mg Oral Daily [facility] DARLENE Peck-C   60 mg at 08/06/20 0902    albuterol (PROVENTIL) nebulizer solution 2.5 mg  2.5 mg Nebulization Q6H PRN Anabell Mchugh APRN - CNP        amLODIPine (NORVASC) tablet 5 mg  5 mg Oral BID Sarah Platts, DO   5 mg at 08/06/20 8170    vitamin C (ASCORBIC ACID) tablet 1,000 mg  1,000 mg Oral Daily Sarah Platts, DO   1,000 mg at 08/06/20 0902    [Held by provider] aspirin chewable tablet 81 mg  81 mg Oral Daily Sarah Velazquez DO   81 mg at 08/06/20 0904    [Held by provider] clopidogrel (PLAVIX) tablet 75 mg  75 mg Oral Daily Deri Brink, DO   75 mg at 08/06/20 0902    glipiZIDE (GLUCOTROL) tablet 10 mg  10 mg Oral BID AC Deri Brink, DO   10 mg at 08/06/20 0600    magnesium oxide (MAG-OX) tablet 400 mg  400 mg Oral Daily Deri Brink, DO   400 mg at 08/06/20 0902    metoprolol tartrate (LOPRESSOR) tablet 100 mg  100 mg Oral BID Deri Brink, DO   100 mg at 08/06/20 0901    rosuvastatin (CRESTOR) tablet 40 mg  40 mg Oral Nightly Deri Brink, DO   40 mg at 08/05/20 2127    zinc gluconate tablet 50 mg  50 mg Oral Daily Deri Brink, DO   50 mg at 08/06/20 0903    glucose (GLUTOSE) 40 % oral gel 15 g  15 g Oral PRN Deri Brink, DO        dextrose 50 % IV solution  12.5 g Intravenous PRN Deri Brink, DO        glucagon (rDNA) injection 1 mg  1 mg Intramuscular PRN Deri Brink, DO        dextrose 5 % solution  100 mL/hr Intravenous PRN Deri Brink, DO        insulin lispro (HUMALOG) injection vial 0-6 Units  0-6 Units Subcutaneous TID WC Deri Brink, DO   2 Units at 08/06/20 0908    insulin lispro (HUMALOG) injection vial 0-3 Units  0-3 Units Subcutaneous Nightly Deri Brink, DO   1 Units at 08/05/20 2133    ondansetron (ZOFRAN) injection 4 mg  4 mg Intravenous Q6H PRN Deri Brink, DO   4 mg at 08/06/20 1230    enoxaparin (LOVENOX) injection 40 mg  40 mg Subcutaneous Daily Deri Brink, DO   40 mg at 08/06/20 1355    alogliptin (NESINA) tablet 25 mg  25 mg Oral Daily Deri Brink, DO   25 mg at 08/06/20 0902    melatonin tablet 15 mg  15 mg Oral Daily Deri Brink, DO   15 mg at 08/05/20 2127       Allergies:     Allergies   Allergen Reactions    Poison Ivy Extract      rash    Iodine Hives    Metformin And Related        Problem List:    Patient Active Problem List   Diagnosis Code    Peripheral neuropathy G62.9    Painful diabetic peripheral neuropathy G62.9    Fibromyalgia M79.7    Chest pain R07.9    CAD (coronary artery disease) I25.10    Obesity E66.9    HTN (hypertension) I10    DM (diabetes mellitus) ([facility] [address] E11.9    Allergy to iodine Z88.8    Chronic fatigue syndrome R53.82    GREGG (generalized anxiety disorder) F41.1    GERD (gastroesophageal reflux disease) K21.9    Deafness in right ear H91.91    Non-ST elevation MI (NSTEMI) ([facility]) I21.4    Morbid obesity ([facility]) E66.01    Lumbar spinal stenosis M48.061    DDD (degenerative disc disease), lumbar M51.36    CVA (cerebrovascular accident) ([facility] [address] I63.9       Past Medical History:        Diagnosis Date    Arthritis     CAD (coronary artery disease) 3/20/2014    Cancer ([facility] [address]     skin-x2 (ear-Dr. Leonides Pedersen)    Chronic fatigue fibromyalgia syndrome     Deafness in right ear     Diabetes mellitus ([facility] [address]     type 2    GERD (gastroesophageal reflux disease)     Hypertension     Morbid obesity ([facility] [address]     Neuromuscular disorder ([facility] [address]     Non-ST elevation MI (NSTEMI) ([facility] [address] 10/2015    patient was transported to [facility] due to aversion from [facility]       Past Surgical History:        Procedure Laterality Date    CARDIAC CATHETERIZATION  3 11 14    Mildl to moderate CAD. Normal left venricular size and systolic functiion. Systemic hypertension. [facility]    COLONOSCOPY      CORONARY ANGIOPLASTY  10/2015    Dr. Jesika Isabel MID/DISTAL CX. PCI of the mid and distal dx artery with [employer] ZACARIAS with MATT grade III flow    DIAGNOSTIC CARDIAC CATH LAB PROCEDURE  02/15/2016    PATENT STENT IN CX, LAD 30%, RCA 40% AT [facility].   EF 60% EF    ENDOSCOPY, COLON, DIAGNOSTIC      GALLBLADDER SURGERY  1982    In [email or web address]     SHOULDER ARTHROSCOPY      SKIN CANCER EXCISION  01/2020    x2-ear-Dr. Leonides Pedersen    TUMOR REMOVAL  bladder tumor       Social History:    Social History     Tobacco Use    Smoking status: Never Smoker    Smokeless tobacco: Never Used   Substance Use Topics    Alcohol use: No     Comment: 1 cup coffee daily [facility]    COVID-19 Screening (If Applicable):   Lab Results   Component Value Date    COVID19 Not Detected 08/05/2020     EKG (8/5/2020):    Ventricular Rate  67  BPM  Final  08/05/2020  9:27 AM  [facility]    Atrial Rate  67  BPM  Final  08/05/2020  9:27 AM  [facility]    P-R Interval  158  ms  Final  08/05/2020  9:27 AM  [facility]    QRS Duration  120  ms  Final  08/05/2020  9:27 AM  [facility]    Q-T Interval  418  ms  Final  08/05/2020  9:27 AM  [facility]    QTc Calculation (Bazett)  441  ms  Final  08/05/2020  9:27 AM  [facility]    R Axis  -171  degrees  Final  08/05/2020  9:27 AM  [facility]    T Axis  153  degrees  Final  08/05/2020  9:27 AM  [facility]    Testing Performed By     Lab - Abbreviation  Name  Director  Address  Valid Date Range    360-[facility]  [facility] MUSE  Unknown  Unknown  04/18/16 0721-Present    Narrative & Impression     Normal sinus rhythm  Right superior axis deviation  Nonspecific intraventricular conduction delay  Nonspecific ST and T wave abnormality  Abnormal ECG           Reviewed as Above. [employer]  Confirmed by Sindy Dudley ([id number]) on 8/6/2020 12:46:29 PM     ECHO (1/21/2020):    TTE procedure:Echo Complete W/Doppler & Color Flow. Procedure Date  Date: 01/21/2020 Start: 11:27 AM     Study Location: Echo Lab  Technical Quality: Poor visualization due to body habitus.     Indications:CVA.     Patient Status: Routine     Contrast Medium: Definity.     Height: 73 inches Weight: 257 pounds BSA: 2.39 m^2 BMI: 33.91 kg/m^2      Findings      Left Ventricle   Mild left ventricular concentric hypertrophy noted. Ejection fraction is visually estimated at 45%. E/A flow reversal noted. Suggestive of diastolic dysfunction. Right Ventricle   Mildly, Moderately dilated right ventricle. Right Atrium   Normal right atrium. Mitral Valve   Physiologic and/or trace mitral regurgitation is present. Tricuspid Valve   RVSP is 13 mmHg.    Physiologic and/or trace tricuspid normal  Echocardiogram reviewed    Cleared by cardiology     Beta Blocker:  Dose within 24 Hrs         Neuro/Psych:   (+) CVA ( January 2020): no interval change, neuromuscular disease ( Fibromyalgia):, psychiatric history ( Generalized anxiety disorder):             ROS comment: Peripheral neuropathy (diabetic, numbness in lower extremities), Deaf in R. Ear, lumbar stenosis, degerative disc disease, bilateral cataract removal.  GI/Hepatic/Renal:   (+) GERD:, morbid obesity         ROS comment: History of cholecystectomy. Endo/Other:    (+) DiabetesType II DM, poorly controlled, using insulin, blood dyscrasia ( Patient on Plavix (Last dose 8/6/20 @0600)): anticoagulation therapy:., malignancy/cancer ( Skin CA on L. ear, removed X2). ROS comment: Chronic fatigue syndrome, History R shoulder scope Abdominal:           Vascular: negative vascular ROS. Anesthesia Plan      MAC     ASA 4     (IV #18 LAC)  Induction: intravenous. MIPS: Prophylactic antiemetics administered. Anesthetic plan and risks discussed with patient. Use of blood products discussed with patient whom consented to blood products. Plan discussed with CRNA and attending. Jeff Corcoran RN   8/6/2020      DOS STAFF ADDENDUM:    Pt seen and examined, chart reviewed (including anesthesia, drug and allergy history). Anesthetic plan, risks, benefits, alternatives, and personnel involved discussed with patient. Patient verbalized an understanding and agrees to proceed. Plan discussed with care team members and agreed upon.     Thi Mack MD  Staff Anesthesiologist  3:59 PM

## 2020-08-06 NOTE — PLAN OF CARE
Problem: Pain:  Goal: Patient's pain/discomfort is manageable  Description: Patient's pain/discomfort is manageable  8/6/2020 0345 by Candie Jarrett RN  Outcome: Met This Shift     Problem: Cardiac:  Goal: Ability to maintain vital signs within normal range will improve  Description: Ability to maintain vital signs within normal range will improve  8/6/2020 0345 by Candie Jarrett RN  Outcome: Met This Shift     Problem: Cardiac:  Goal: Cardiovascular alteration will improve  Description: Cardiovascular alteration will improve  Outcome: Met This Shift     Problem: Falls - Risk of:  Goal: Will remain free from falls  Description: Will remain free from falls  Outcome: Met This Shift     Problem: Falls - Risk of:  Goal: Absence of physical injury  Description: Absence of physical injury  Outcome: Met This Shift

## 2020-08-07 ENCOUNTER — ANESTHESIA (OUTPATIENT)
Dept: ENDOSCOPY | Age: 70
DRG: 313 | End: 2020-08-07
Payer: MEDICARE

## 2020-08-07 VITALS
WEIGHT: 265 LBS | SYSTOLIC BLOOD PRESSURE: 175 MMHG | OXYGEN SATURATION: 95 % | HEIGHT: 73 IN | TEMPERATURE: 98.1 F | RESPIRATION RATE: 16 BRPM | BODY MASS INDEX: 35.12 KG/M2 | HEART RATE: 78 BPM | DIASTOLIC BLOOD PRESSURE: 88 MMHG

## 2020-08-07 VITALS
SYSTOLIC BLOOD PRESSURE: 138 MMHG | OXYGEN SATURATION: 96 % | RESPIRATION RATE: 19 BRPM | DIASTOLIC BLOOD PRESSURE: 81 MMHG

## 2020-08-07 LAB
ANION GAP SERPL CALCULATED.3IONS-SCNC: 20 MMOL/L (ref 7–16)
BASOPHILS ABSOLUTE: 0.02 E9/L (ref 0–0.2)
BASOPHILS RELATIVE PERCENT: 0.2 % (ref 0–2)
BUN BLDV-MCNC: 25 MG/DL (ref 8–23)
CALCIUM SERPL-MCNC: 9.3 MG/DL (ref 8.6–10.2)
CHLORIDE BLD-SCNC: 99 MMOL/L (ref 98–107)
CK MB: 1.4 NG/ML (ref 0–7.7)
CO2: 21 MMOL/L (ref 22–29)
CREAT SERPL-MCNC: 0.9 MG/DL (ref 0.7–1.2)
EOSINOPHILS ABSOLUTE: 0.04 E9/L (ref 0.05–0.5)
EOSINOPHILS RELATIVE PERCENT: 0.4 % (ref 0–6)
GFR AFRICAN AMERICAN: >60
GFR NON-AFRICAN AMERICAN: >60 ML/MIN/1.73
GLUCOSE BLD-MCNC: 207 MG/DL (ref 74–99)
HCT VFR BLD CALC: 43.4 % (ref 37–54)
HEMOGLOBIN: 14.6 G/DL (ref 12.5–16.5)
IMMATURE GRANULOCYTES #: 0.03 E9/L
IMMATURE GRANULOCYTES %: 0.3 % (ref 0–5)
LYMPHOCYTES ABSOLUTE: 1.56 E9/L (ref 1.5–4)
LYMPHOCYTES RELATIVE PERCENT: 14.4 % (ref 20–42)
MCH RBC QN AUTO: 31.8 PG (ref 26–35)
MCHC RBC AUTO-ENTMCNC: 33.6 % (ref 32–34.5)
MCV RBC AUTO: 94.6 FL (ref 80–99.9)
METER GLUCOSE: 145 MG/DL (ref 74–99)
METER GLUCOSE: 157 MG/DL (ref 74–99)
METER GLUCOSE: 182 MG/DL (ref 74–99)
MONOCYTES ABSOLUTE: 0.7 E9/L (ref 0.1–0.95)
MONOCYTES RELATIVE PERCENT: 6.5 % (ref 2–12)
NEUTROPHILS ABSOLUTE: 8.5 E9/L (ref 1.8–7.3)
NEUTROPHILS RELATIVE PERCENT: 78.2 % (ref 43–80)
PDW BLD-RTO: 13.3 FL (ref 11.5–15)
PLATELET # BLD: 172 E9/L (ref 130–450)
PMV BLD AUTO: 10.4 FL (ref 7–12)
POTASSIUM SERPL-SCNC: 3.4 MMOL/L (ref 3.5–5)
RBC # BLD: 4.59 E12/L (ref 3.8–5.8)
SODIUM BLD-SCNC: 140 MMOL/L (ref 132–146)
TOTAL CK: 60 U/L (ref 20–200)
TROPONIN: <0.01 NG/ML (ref 0–0.03)
WBC # BLD: 10.9 E9/L (ref 4.5–11.5)

## 2020-08-07 PROCEDURE — 6360000002 HC RX W HCPCS: Performed by: NURSE PRACTITIONER

## 2020-08-07 PROCEDURE — 0D757ZZ DILATION OF ESOPHAGUS, VIA NATURAL OR ARTIFICIAL OPENING: ICD-10-PCS | Performed by: INTERNAL MEDICINE

## 2020-08-07 PROCEDURE — G0378 HOSPITAL OBSERVATION PER HR: HCPCS

## 2020-08-07 PROCEDURE — 6370000000 HC RX 637 (ALT 250 FOR IP): Performed by: INTERNAL MEDICINE

## 2020-08-07 PROCEDURE — 96372 THER/PROPH/DIAG INJ SC/IM: CPT

## 2020-08-07 PROCEDURE — 36415 COLL VENOUS BLD VENIPUNCTURE: CPT

## 2020-08-07 PROCEDURE — 2580000003 HC RX 258

## 2020-08-07 PROCEDURE — 94640 AIRWAY INHALATION TREATMENT: CPT

## 2020-08-07 PROCEDURE — 7100000010 HC PHASE II RECOVERY - FIRST 15 MIN: Performed by: INTERNAL MEDICINE

## 2020-08-07 PROCEDURE — 1200000000 HC SEMI PRIVATE

## 2020-08-07 PROCEDURE — 82553 CREATINE MB FRACTION: CPT

## 2020-08-07 PROCEDURE — 88342 IMHCHEM/IMCYTCHM 1ST ANTB: CPT

## 2020-08-07 PROCEDURE — 0DB78ZX EXCISION OF STOMACH, PYLORUS, VIA NATURAL OR ARTIFICIAL OPENING ENDOSCOPIC, DIAGNOSTIC: ICD-10-PCS | Performed by: INTERNAL MEDICINE

## 2020-08-07 PROCEDURE — 2709999900 HC NON-CHARGEABLE SUPPLY: Performed by: INTERNAL MEDICINE

## 2020-08-07 PROCEDURE — 7100000011 HC PHASE II RECOVERY - ADDTL 15 MIN: Performed by: INTERNAL MEDICINE

## 2020-08-07 PROCEDURE — 3700000000 HC ANESTHESIA ATTENDED CARE: Performed by: INTERNAL MEDICINE

## 2020-08-07 PROCEDURE — 82550 ASSAY OF CK (CPK): CPT

## 2020-08-07 PROCEDURE — 82787 IGG 1 2 3 OR 4 EACH: CPT

## 2020-08-07 PROCEDURE — 88305 TISSUE EXAM BY PATHOLOGIST: CPT

## 2020-08-07 PROCEDURE — 6360000002 HC RX W HCPCS: Performed by: INTERNAL MEDICINE

## 2020-08-07 PROCEDURE — 80048 BASIC METABOLIC PNL TOTAL CA: CPT

## 2020-08-07 PROCEDURE — 3609012400 HC EGD TRANSORAL BIOPSY SINGLE/MULTIPLE: Performed by: INTERNAL MEDICINE

## 2020-08-07 PROCEDURE — C9113 INJ PANTOPRAZOLE SODIUM, VIA: HCPCS | Performed by: NURSE PRACTITIONER

## 2020-08-07 PROCEDURE — APPSS45 APP SPLIT SHARED TIME 31-45 MINUTES: Performed by: PHYSICIAN ASSISTANT

## 2020-08-07 PROCEDURE — 84484 ASSAY OF TROPONIN QUANT: CPT

## 2020-08-07 PROCEDURE — 86301 IMMUNOASSAY TUMOR CA 19-9: CPT

## 2020-08-07 PROCEDURE — 3700000001 HC ADD 15 MINUTES (ANESTHESIA): Performed by: INTERNAL MEDICINE

## 2020-08-07 PROCEDURE — 82962 GLUCOSE BLOOD TEST: CPT

## 2020-08-07 PROCEDURE — 85025 COMPLETE CBC W/AUTO DIFF WBC: CPT

## 2020-08-07 PROCEDURE — 93005 ELECTROCARDIOGRAM TRACING: CPT | Performed by: INTERNAL MEDICINE

## 2020-08-07 PROCEDURE — 96376 TX/PRO/DX INJ SAME DRUG ADON: CPT

## 2020-08-07 PROCEDURE — 6360000002 HC RX W HCPCS

## 2020-08-07 RX ORDER — SODIUM CHLORIDE 9 MG/ML
INJECTION, SOLUTION INTRAVENOUS CONTINUOUS PRN
Status: DISCONTINUED | OUTPATIENT
Start: 2020-08-07 | End: 2020-08-07 | Stop reason: SDUPTHER

## 2020-08-07 RX ORDER — ISOSORBIDE MONONITRATE 30 MG/1
30 TABLET, EXTENDED RELEASE ORAL DAILY
Qty: 30 TABLET | Refills: 5 | COMMUNITY
Start: 2020-08-07 | End: 2020-11-20 | Stop reason: ALTCHOICE

## 2020-08-07 RX ORDER — MULTIVIT WITH MINERALS/LUTEIN
1000 TABLET ORAL 2 TIMES DAILY
Qty: 30 TABLET | Refills: 3 | COMMUNITY
Start: 2020-08-07 | End: 2020-11-20 | Stop reason: DRUGHIGH

## 2020-08-07 RX ORDER — LOSARTAN POTASSIUM 100 MG/1
100 TABLET ORAL DAILY
Qty: 30 TABLET | Refills: 1 | Status: SHIPPED | OUTPATIENT
Start: 2020-08-08

## 2020-08-07 RX ORDER — PANTOPRAZOLE SODIUM 40 MG/1
40 TABLET, DELAYED RELEASE ORAL
Qty: 30 TABLET | Refills: 1 | Status: SHIPPED | OUTPATIENT
Start: 2020-08-08 | End: 2020-11-20 | Stop reason: ALTCHOICE

## 2020-08-07 RX ORDER — PANTOPRAZOLE SODIUM 40 MG/1
40 TABLET, DELAYED RELEASE ORAL
Status: DISCONTINUED | OUTPATIENT
Start: 2020-08-08 | End: 2020-08-07 | Stop reason: HOSPADM

## 2020-08-07 RX ORDER — ONDANSETRON 2 MG/ML
4 INJECTION INTRAMUSCULAR; INTRAVENOUS EVERY 4 HOURS PRN
Status: DISCONTINUED | OUTPATIENT
Start: 2020-08-07 | End: 2020-08-07 | Stop reason: HOSPADM

## 2020-08-07 RX ORDER — POTASSIUM CHLORIDE 20 MEQ/1
20 TABLET, EXTENDED RELEASE ORAL ONCE
Status: COMPLETED | OUTPATIENT
Start: 2020-08-07 | End: 2020-08-07

## 2020-08-07 RX ORDER — HYDROXYZINE PAMOATE 25 MG/1
25 CAPSULE ORAL 3 TIMES DAILY PRN
Qty: 42 CAPSULE | Refills: 0 | Status: SHIPPED | OUTPATIENT
Start: 2020-08-07 | End: 2020-08-21

## 2020-08-07 RX ORDER — HYDROXYZINE HYDROCHLORIDE 50 MG/ML
50 INJECTION, SOLUTION INTRAMUSCULAR ONCE
Status: COMPLETED | OUTPATIENT
Start: 2020-08-07 | End: 2020-08-07

## 2020-08-07 RX ORDER — PROPOFOL 10 MG/ML
INJECTION, EMULSION INTRAVENOUS PRN
Status: DISCONTINUED | OUTPATIENT
Start: 2020-08-07 | End: 2020-08-07 | Stop reason: SDUPTHER

## 2020-08-07 RX ORDER — AMLODIPINE BESYLATE 5 MG/1
5 TABLET ORAL 2 TIMES DAILY
Qty: 60 TABLET | Refills: 1 | Status: SHIPPED | OUTPATIENT
Start: 2020-08-07

## 2020-08-07 RX ADMIN — BUDESONIDE 500 MCG: 0.5 INHALANT RESPIRATORY (INHALATION) at 16:17

## 2020-08-07 RX ADMIN — ONDANSETRON 4 MG: 2 INJECTION INTRAMUSCULAR; INTRAVENOUS at 08:03

## 2020-08-07 RX ADMIN — BUDESONIDE 500 MCG: 0.5 INHALANT RESPIRATORY (INHALATION) at 06:37

## 2020-08-07 RX ADMIN — POTASSIUM CHLORIDE 20 MEQ: 1500 TABLET, EXTENDED RELEASE ORAL at 18:27

## 2020-08-07 RX ADMIN — ARFORMOTEROL TARTRATE 15 MCG: 15 SOLUTION RESPIRATORY (INHALATION) at 06:37

## 2020-08-07 RX ADMIN — MORPHINE SULFATE 2 MG: 2 INJECTION, SOLUTION INTRAMUSCULAR; INTRAVENOUS at 05:38

## 2020-08-07 RX ADMIN — MORPHINE SULFATE 2 MG: 2 INJECTION, SOLUTION INTRAMUSCULAR; INTRAVENOUS at 09:30

## 2020-08-07 RX ADMIN — PANTOPRAZOLE SODIUM 40 MG: 40 INJECTION, POWDER, FOR SOLUTION INTRAVENOUS at 09:10

## 2020-08-07 RX ADMIN — HYDROXYZINE HYDROCHLORIDE 50 MG: 50 INJECTION, SOLUTION INTRAMUSCULAR at 09:55

## 2020-08-07 RX ADMIN — ALBUTEROL SULFATE 2.5 MG: 2.5 SOLUTION RESPIRATORY (INHALATION) at 16:17

## 2020-08-07 RX ADMIN — GLIPIZIDE 10 MG: 5 TABLET ORAL at 16:19

## 2020-08-07 RX ADMIN — INSULIN LISPRO 1 UNITS: 100 INJECTION, SOLUTION INTRAVENOUS; SUBCUTANEOUS at 16:35

## 2020-08-07 RX ADMIN — ARFORMOTEROL TARTRATE 15 MCG: 15 SOLUTION RESPIRATORY (INHALATION) at 16:17

## 2020-08-07 RX ADMIN — PROPOFOL 50 MG: 10 INJECTION, EMULSION INTRAVENOUS at 14:17

## 2020-08-07 RX ADMIN — SODIUM CHLORIDE: 9 INJECTION, SOLUTION INTRAVENOUS at 14:04

## 2020-08-07 RX ADMIN — PROPOFOL 50 MG: 10 INJECTION, EMULSION INTRAVENOUS at 14:15

## 2020-08-07 RX ADMIN — PROPOFOL 50 MG: 10 INJECTION, EMULSION INTRAVENOUS at 14:12

## 2020-08-07 RX ADMIN — AMLODIPINE BESYLATE 5 MG: 5 TABLET ORAL at 16:19

## 2020-08-07 RX ADMIN — PROPOFOL 50 MG: 10 INJECTION, EMULSION INTRAVENOUS at 14:19

## 2020-08-07 RX ADMIN — ONDANSETRON 4 MG: 2 INJECTION INTRAMUSCULAR; INTRAVENOUS at 03:10

## 2020-08-07 ASSESSMENT — PAIN SCALES - GENERAL
PAINLEVEL_OUTOF10: 8
PAINLEVEL_OUTOF10: 8
PAINLEVEL_OUTOF10: 4
PAINLEVEL_OUTOF10: 6
PAINLEVEL_OUTOF10: 5
PAINLEVEL_OUTOF10: 0
PAINLEVEL_OUTOF10: 0

## 2020-08-07 ASSESSMENT — PAIN DESCRIPTION - LOCATION
LOCATION: NECK
LOCATION: NECK

## 2020-08-07 ASSESSMENT — PAIN DESCRIPTION - PAIN TYPE
TYPE: CHRONIC PAIN
TYPE: CHRONIC PAIN

## 2020-08-07 ASSESSMENT — PAIN DESCRIPTION - FREQUENCY: FREQUENCY: CONTINUOUS

## 2020-08-07 ASSESSMENT — PAIN DESCRIPTION - ONSET: ONSET: ON-GOING

## 2020-08-07 ASSESSMENT — PAIN DESCRIPTION - DESCRIPTORS
DESCRIPTORS: ACHING;CONSTANT
DESCRIPTORS: CONSTANT;OTHER (COMMENT)

## 2020-08-07 ASSESSMENT — PAIN DESCRIPTION - ORIENTATION: ORIENTATION: MID

## 2020-08-07 ASSESSMENT — PAIN DESCRIPTION - PROGRESSION: CLINICAL_PROGRESSION: NOT CHANGED

## 2020-08-07 ASSESSMENT — PAIN - FUNCTIONAL ASSESSMENT: PAIN_FUNCTIONAL_ASSESSMENT: PREVENTS OR INTERFERES SOME ACTIVE ACTIVITIES AND ADLS

## 2020-08-07 NOTE — PROGRESS NOTES
(LOPRESSOR) 100 MG tablet Take 100 mg by mouth 2 times daily   Yes Historical Provider, MD   insulin 70-30 (HUMULIN 70/30) (70-30) 100 UNIT per ML injection vial Inject 28 Units into the skin every morning    Yes Historical Provider, MD   insulin glargine (LANTUS) 100 UNIT/ML injection vial Inject 50 Units into the skin 2 times daily   Yes Historical Provider, MD   MELATONIN ER PO Take 20 mg by mouth daily    Yes Historical Provider, MD   zinc gluconate 50 MG tablet Take 50 mg by mouth daily   Yes Historical Provider, MD   magnesium oxide (MAG-OX) 400 MG tablet Take 400 mg by mouth daily   Yes Historical Provider, MD   clopidogrel (PLAVIX) 75 MG tablet Take 75 mg by mouth daily  3/27/17  Yes Historical Provider, MD   rosuvastatin (CRESTOR) 40 MG tablet 40 mg daily  4/28/17  Yes Historical Provider, MD   Ascorbic Acid (VITAMIN C) 250 MG tablet Take 1,000 mg by mouth daily   Yes Historical Provider, MD   aspirin 81 MG tablet Take 81 mg by mouth daily. Yes Historical Provider, MD   sitaGLIPtan (JANUVIA) 100 MG tablet Take 100 mg by mouth daily.    Yes Historical Provider, MD       CURRENT MEDICATIONS:      Current Facility-Administered Medications:     ondansetron (ZOFRAN) injection 4 mg, 4 mg, Intravenous, Q4H PRN, Miladis Herrera, DO, 4 mg at 08/07/20 0803    acetaminophen (TYLENOL) tablet 650 mg, 650 mg, Oral, Q4H PRN, Alexis Herrera, DO, 650 mg at 08/06/20 0032    pantoprazole (PROTONIX) injection 40 mg, 40 mg, Intravenous, Daily, Tilmon Massy, APRN - CNP, 40 mg at 08/06/20 0903    morphine (PF) injection 2 mg, 2 mg, Intravenous, Q4H PRN, Tilmon Massy, APRN - CNP, 2 mg at 08/07/20 0538    benzonatate (TESSALON) capsule 100 mg, 100 mg, Oral, TID PRN, Tilmon Massy, APRN - CNP, 100 mg at 08/06/20 0139    Arformoterol Tartrate (BROVANA) nebulizer solution 15 mcg, 15 mcg, Nebulization, BID, 15 mcg at 08/07/20 0637 **AND** budesonide (PULMICORT) nebulizer suspension 500 mcg, 0.5 mg, Nebulization, BID, Vibha Medina ADA Sousa CNP, 500 mcg at 08/07/20 2268    losartan (COZAAR) tablet 100 mg, 100 mg, Oral, Daily, Ilene Peck PA-C, 100 mg at 08/06/20 0903    isosorbide mononitrate (IMDUR) extended release tablet 60 mg, 60 mg, Oral, Daily, Ilene Peck PA-C, 60 mg at 08/06/20 0902    albuterol (PROVENTIL) nebulizer solution 2.5 mg, 2.5 mg, Nebulization, Q6H PRN, ADA Joel CNP    amLODIPine (NORVASC) tablet 5 mg, 5 mg, Oral, BID, Lovetta , DO, 5 mg at 08/06/20 5145    vitamin C (ASCORBIC ACID) tablet 1,000 mg, 1,000 mg, Oral, Daily, Lovetta , DO, 1,000 mg at 08/06/20 0902    [Held by provider] aspirin chewable tablet 81 mg, 81 mg, Oral, Daily, Lovetta , DO, 81 mg at 08/06/20 0904    [Held by provider] clopidogrel (PLAVIX) tablet 75 mg, 75 mg, Oral, Daily, Lovetta , DO, 75 mg at 08/06/20 0902    glipiZIDE (GLUCOTROL) tablet 10 mg, 10 mg, Oral, BID AC, Lovetta , DO, 10 mg at 08/06/20 0600    magnesium oxide (MAG-OX) tablet 400 mg, 400 mg, Oral, Daily, Lovetta , DO, 400 mg at 08/06/20 0902    metoprolol tartrate (LOPRESSOR) tablet 100 mg, 100 mg, Oral, BID, Lovetta , DO, 100 mg at 08/06/20 0901    rosuvastatin (CRESTOR) tablet 40 mg, 40 mg, Oral, Nightly, Lovetta , DO, 40 mg at 08/05/20 2127    zinc gluconate tablet 50 mg, 50 mg, Oral, Daily, Lovetta , DO, 50 mg at 08/06/20 0903    glucose (GLUTOSE) 40 % oral gel 15 g, 15 g, Oral, PRN, Lovetta , DO    dextrose 50 % IV solution, 12.5 g, Intravenous, PRN, Lovetta , DO    glucagon (rDNA) injection 1 mg, 1 mg, Intramuscular, PRN, Lovetta , DO    dextrose 5 % solution, 100 mL/hr, Intravenous, PRN, Lovetta , DO    insulin lispro (HUMALOG) injection vial 0-6 Units, 0-6 Units, Subcutaneous, TID WC, Lovetta , DO, 2 Units at 08/06/20 0908    insulin lispro (HUMALOG) injection vial 0-3 Units, 0-3 Units, Subcutaneous, Nightly, Lovetta , DO, 1 Units at 08/06/20 2105    [Held by provider] enoxaparin (LOVENOX) varicosities. Pedal pulses palpable, no clubbing or cyanosis. ABDOMEN: Soft, non-tender to light palpation. Bowel sounds present. MS: Good muscle strength and tone. No atrophy or abnormal movements. SKIN: Warm and dry. No statis dermatitis or ulcers. NEURO / PSYCH: Oriented to person, place and time. Speech clear and appropriate. Follows all commands. DATA:    Telemetry: Normal sinus rhythm with HR in the 70s, occasional PVCs    Diagnostic:  All diagnostic testing and lab work thus far this admission reviewed in detail.     Chest CT 08/05/2020: Impression:  1. The lungs are clear. Dorothye Hero is no focal infiltrate, mass or pleural   effusion. 2. Findings of chronic pancreatitis. MRI of the Abdomen 08/06/2020: Impression:  1. Unchanged moderate distal pancreatic ductal dilatation could be due to   sequela of prior pancreatitis, significantly limited in evaluation.            Intake/Output Summary (Last 24 hours) at 8/7/2020 0850  Last data filed at 8/7/2020 1177  Gross per 24 hour   Intake 0 ml   Output 1100 ml   Net -1100 ml       Labs:   CBC:   Recent Labs     08/05/20  0419 08/06/20  0545   WBC 8.3 9.1   HGB 14.2 14.6   HCT 43.6 44.5    171     BMP:   Recent Labs     08/05/20  0419 08/06/20  0545    138   K 3.7 4.0   CO2 25 26   BUN 9 15   CREATININE 0.7 0.8   LABGLOM >60 >60   CALCIUM 8.8 9.0     Mag:   Recent Labs     08/05/20  0419   MG 1.8     Phos:   Recent Labs     08/05/20  0419   PHOS 2.8     TSH:   Recent Labs     08/05/20  0419   TSH 1.460     HgA1c:   Lab Results   Component Value Date    LABA1C 7.5 (H) 08/04/2020     PT/INR:   Recent Labs     08/06/20  1050   PROTIME 12.7*   INR 1.1     APTT:  Recent Labs     08/06/20  1050   APTT 34.2     CARDIAC ENZYMES:  Recent Labs     08/04/20  2238 08/05/20  0419 08/05/20  0937   TROPONINI <0.01 <0.01 <0.01     FASTING LIPID PANEL:  Lab Results   Component Value Date    CHOL 129 08/05/2020    HDL 44 08/05/2020    LDLCALC 41 08/05/2020 TRIG 222 08/05/2020     LIVER PROFILE:  Recent Labs     08/06/20  0545   AST 13   ALT 16   LABALBU 3.8         ASSESSMENT:  1. Chest pain/jaw pain: Currently chest/jaw pain free, denies any recurrent chest pain/jaw pain since admission to me on interview again today. Troponin negative x 4. No acute ST changes on EKGs. Normal pharmacologic stress testing in January 2020. COVID-19 test negative, with no acute cardiopulmonary disease on Chest CT but findings of chronic pancreatitis. Of note, patient also states he did not come to the hospital for chest pain evaluation but rather concern for COVID-19.  2. Upper respiratory symptoms: COVID-19 test negative. Denies recurrent URI symptoms again today. 3. CAD s/p PCI and ZACARIAS placement to CFX in 2015: Continue statin and beta-blocker therapy. Resume ASA and Plavix when okay by GI service. 4. Hyperlipidemia: Continue statin. 5. Hypertension: Improvement in blood pressure. 6. LV dysfunction/?cardiomyopathy: TTE 01/2020 revealing EF of 45%. On losartan and BB therapy. No signs of acute HF.   7. Carotid artery disease: Continue ASA (when able per GI) and statin. 8. Obesity. 9. 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 10. Fibromyalgia, chronic fatigue syndrome. 11. GERD. RECOMMENDATIONS:  1. BNP only 224 on admission. 2. Cardiac enzymes have been repeatedly negative with no further complaints of actual angina. No acute ST changes on EKG. Negative pharmacologic stress test in January of this year. Patient's presenting complaint to the hospital was concern for COVID-19.  3. Blood pressure better controlled. 4. Continue ASA and Plavix when okay with GI service. 5. Continue other cardiac medications the same at this time. 6. For GI evaluation today with EGD . 7. Further recommendations as per Dr. Kezia Wilson. The above case and recommendations have been discussed with Dr. Kezia Wilson.     Shira Rodriguez, [address] [place] [address] Cardiology    Electronically signed by Vivi Hebert PA-C on 8/7/2020 at 8:50 AM       Addendum:  Katina Lopez MD     Reason for visit: Chest pain     Patient previously known to: Dr. Chen De Jesus     History of Present illness: 78 yr pt with history of CAD with PCI and ZACARIAS placement to the mid to distal circumflex artery in 2015. , diabetes mellitus type II, fibromyalgia, chronic fatigue syndrome, carotid artery disease, obesity, hypertension, hyperlipidemia, deafness in the right ear, gastroesophageal reflux disease, BPH, LV dysfunction on echocardiogram in January 2020, admitted for multiple complaints. (Concern for COVID-19 infection, coughing and chest congestion, Nausea, sore throat, some chest, epigastric pain) Cardiology consulted for chest pain; He denies any exertional CP; No PND or orthopnea, Having nausea. occ epigastric and back pain; GI on case.         ASSESSMENT/PLAN:     1. Chest pain/jaw pain: Atypical ,MI ruled out, Currently chest/jaw pain free. No acute ST changes on EKG. Normal pharmacologic stress testing in January 2020. Can have out-pt stress test if recurrent exertional chest pain.      2. Nausea and epigastric pain  -GI on case - Cleared for endoscopy.      3. CAD s/p PCI and ZACARIAS placement to Circumflex in 2015: Continue ASA, statin and beta-blocker therapy. Continue Plavix for now.      4. Hyperlipidemia: Continue statin.     5. Hypertension: Monitor BP; adjust medications accordingly.      6. LV dysfunction:  Echo 01/2020 revealing EF of 45%. On Losartan and BB therapy. No signs of acute HF.                  Cardiology sign off  F/u by Dr Rene Vidal 2-4 weeks after discharge.     Electronically signed by Katina Lopez MD on 8/7/2020 at 12:50 PM

## 2020-08-07 NOTE — PROCEDURES
Reggie Lazar is a 78 y.o. male patient. 1. Chest pain, unspecified type      Past Medical History:   Diagnosis Date    Arthritis     CAD (coronary artery disease) 3/20/2014    Cancer ([facility]     skin-x2 (ear-Dr. Arleen Hunter)    Chronic fatigue fibromyalgia syndrome     Deafness in right ear     Diabetes mellitus ([facility]     type 2    GERD (gastroesophageal reflux disease)     Hypertension     Morbid obesity ([facility]     Neuromuscular disorder ([facility]     Non-ST elevation MI (NSTEMI) ([facility] 10/2015    patient was transported to [facility] due to aversion from [facility]     Blood pressure (!) 178/90, pulse 93, temperature 97.3 °F (36.3 °C), temperature source Axillary, resp. rate 22, height 6' 1\" (1.854 m), weight 265 lb (120.2 kg), SpO2 98 %. Procedures    Procedure:  Esophagogastroduodenoscopy    Indication:  Epigastric Pain, Dysphagia, Nausea and Vomiting     Sedation:  MAC    Estimated Blood Loss: 1cc     Endoscope was advanced easily through mouth to second portion of duodenum      Oropharynx views are limited but grossly normal.    Esophagus:   Mucosa with LA Grade A Esophagitis biopsy taken GEJ at 43 cm. Empirically dilated with 56F [place] after the procedure was terminated. Stomach:   Antrum with moderate gastritis present, biopsy taken    Gastric body is normal.    Retroflexed views show normal fundus and cardia. Duodenum: Bulb is normal.    Second portion of duodenum is normal.      No fresh or old blood was present     IMPRESSION AND PLAN:     1.Mucosa with LA Grade A Esophagitis biopsy taken. Place on Protonix 40mg daily     2. Antrum with moderate gastritis present, biospy taken to rule out h. Pylori    3. Empirically dilated with 56F [place] after the procedure was terminated. Patient tolerated well    4. Okay to restart low fat diet. 5, Okay to restart anticoagulation from GI POV.        Pt was seen and procedure was performed with Dr. Viridiana Pierre present for the entire procedure     Yoan Tubbs DO

## 2020-08-07 NOTE — PROGRESS NOTES
Interval H&P     HPI reviewed no changes patient with continued epigastric pain. MRCP was explained to the patient in detail looks like patient is history of chronic pancreatitis. Patient has a significant history of alcohol abuse in the past.    Vitals:    08/07/20 0930   BP:    Pulse:    Resp: 22   Temp:    SpO2:       Plan: Proceed with EGD this afternoon to evaluate patients intractable epigastric pain along with nausea and vomiting. Rommel Moran DO   GI Fellow   10:08 AM    Pt seen and independently examined. Pertinent notes and lab work reviewed. Monitor reviewed showing sinus rhythm. D/w Dr. Sam Cerda with physical exam and A&P. Discussed with patient - all questions answered - agreeable with the plan as delineated, including plan for endoscopy as described. Domenico Oliver MD  8/7/2020  11:30 AM      Immediately prior to the procedure the patient's History and Physical was reviewed- there are no changes with the current vitals. BP (!) 178/90   Pulse 93   Temp 97.3 °F (36.3 °C) (Axillary)   Resp 22   Ht 6' 1\" (1.854 m)   Wt 265 lb (120.2 kg)   SpO2 98%   BMI 34.96 kg/m²     No CP/SOB. Risks/benefits d/w pt. All questions answered. Proceed with EGD. Pt does admit to intermittent dysphagia to solids.     DO Jared  8/7/2020  2:07 PM

## 2020-08-07 NOTE — PROGRESS NOTES
Discussed case this AM with Dr. Edgardo oGmez. No further inpatient cardiac evaluation indicated at this time. Patient is considered low risk for cardiac morbid event during EGD/colonoscopy per Dr. Edgardo Gomez. Please call with any questions or concerns.       Lito Espinoza [facility] Cardiology

## 2020-08-08 LAB
EKG ATRIAL RATE: 78 BPM
EKG P AXIS: 24 DEGREES
EKG P-R INTERVAL: 164 MS
EKG Q-T INTERVAL: 404 MS
EKG QRS DURATION: 116 MS
EKG QTC CALCULATION (BAZETT): 460 MS
EKG R AXIS: -21 DEGREES
EKG T AXIS: 33 DEGREES
EKG VENTRICULAR RATE: 78 BPM

## 2020-08-08 NOTE — DISCHARGE SUMMARY
complain with concern over the fact that he  might have COVID. Initial testing of COVID-19 was negative along with  CAT scan that was unremarkable. He was admitted to the hospital at this  time. Was seen by Cardiology. At this time, his enzymes came back  normal, and the patient did decline a stress test.  It was noted at this  time that the abnormality noted on the CAT scan of the lung did show  some inflammation of the pancreas. In view of this, an MRI was  performed, and GI was consulted. Subsequently, at this time, the  patient underwent an upper GI panendoscopy on 08/07, which did show some  antral gastritis, some esophageal erosin in the esophagus along with  esophagitis. The patient was seen afterwards at this time, and the  results were discussed. Prior to the endoscopy, he did have an anxiety  attack, which responded well to Vistaril. The patient was seen on  08/07, at which time extensive discussion was given with his wife and  the patient at this time. The patient does seem to be obsessed with the  fact of possible COVID. He does suffer a lot of anxiety at this time. He has no symptomatology of loss of taste or smell. His MRI was  negative, and the first COVID test was negative. Because of lack of  symptomatology of fever or chills or any other symptoms other than  breathlessness, I feel that it is not warranted at this time to do a  repeat COVID test.  I would recommend proper testing at this time, and  if concerned, self-quarantine. The patient would be reasonable to get  an outpatient COVID-19 test done if he would like. He was seen by Dr. Bernabe Serrato at this time and was suggested to possibly follow up with an  outpatient stress test.  He did have a lot of anxiety, which did respond  well to Vistaril.   Because of the symptomatology, the patient was felt  stable enough to be discharged at this time from the hospital.    The patient was discharged on 08/07 with adjustment in his blood  pressure medication. His blood pressure was 175/88, pulse 78,  respirations 18. He was afebrile, and further lab work was  satisfactory. The patient is recommended to follow up with Dr. Kelly Pearl in  one week. The patient will follow up with his cardiologist, either Dr. Sheldon Yanez or [facility] cardiologist; will consider outpatient stress test.  The patient did have extensive discussion of the abnormality noted on  the CAT scan and the MRI. The patient was given Vistaril 25 mg q.8  p.r.n. for anxiety, Protonix 40 b.i.d. He will follow up with [facility]. Adjustments were made in his blood pressure  medication. Norvasc was increased to 5 mg b.i.d., Cozaar will be  maintained at 100 mg daily along with vitamin C 1000 mg b.i.d. The  patient will continue aspirin 81 daily, Plavix 75 daily, Glucotrol 10 mg  four times a day, Humulin 70/30 28 units subcu in the a.m., and Lantus  50 units b.i.d. The patient will also continue Imdur 30 daily, Januvia  100 daily, mag oxide four daily, metoprolol 100 b.i.d., Crestor 40  daily, Flomax 0.4 at bedtime, and zinc 50 mg daily. The patient at this time did seem to be extremely concerned over [place]. We did inform him of the limitations of these tests and will follow up. To return if any problems should arise. More than 60 minutes were spent  on the day of discharge with more than 50% of the time spent  face-to-face with the patient and his wife at this time discussing the  results of the tests, our recommendation for followup, and further  changes as clinically indicated. The patient did voice understanding  and was instructed to return if any problems should arise. We would  recommend weight loss, glycemic control, and reduction of risk factors  at this time.         Maixne Carcamo DO    D: 08/07/2020 42:93:57       T: 08/07/2020 20:05:28     [email or web address]  Job#: [id number]     Doc#: [id number]    CC:

## 2020-08-10 LAB
CA 19-9: 33 U/ML (ref 0–37)
IGG 1: 645 MG/DL (ref 240–1118)
IGG 2: 252 MG/DL (ref 124–549)
IGG 3: 18 MG/DL (ref 21–134)
IGG 4: 20 MG/DL (ref 1–123)

## 2020-11-19 ENCOUNTER — APPOINTMENT (OUTPATIENT)
Dept: GENERAL RADIOLOGY | Age: 70
DRG: 871 | End: 2020-11-19
Payer: MEDICARE

## 2020-11-19 ENCOUNTER — HOSPITAL ENCOUNTER (INPATIENT)
Age: 70
LOS: 6 days | Discharge: HOME | DRG: 871 | End: 2020-11-25
Attending: EMERGENCY MEDICINE | Admitting: INTERNAL MEDICINE
Payer: MEDICARE

## 2020-11-19 PROBLEM — J96.01 ACUTE RESPIRATORY FAILURE WITH HYPOXIA (HCC): Status: ACTIVE | Noted: 2020-11-19

## 2020-11-19 LAB
ALBUMIN SERPL-MCNC: 3.4 G/DL (ref 3.5–5.2)
ALP BLD-CCNC: 76 U/L (ref 40–129)
ALT SERPL-CCNC: 14 U/L (ref 0–40)
ANION GAP SERPL CALCULATED.3IONS-SCNC: 10 MMOL/L (ref 7–16)
AST SERPL-CCNC: 26 U/L (ref 0–39)
BASOPHILS ABSOLUTE: 0.01 E9/L (ref 0–0.2)
BASOPHILS RELATIVE PERCENT: 0.2 % (ref 0–2)
BILIRUB SERPL-MCNC: 0.6 MG/DL (ref 0–1.2)
BUN BLDV-MCNC: 14 MG/DL (ref 8–23)
CALCIUM SERPL-MCNC: 8.4 MG/DL (ref 8.6–10.2)
CHLORIDE BLD-SCNC: 95 MMOL/L (ref 98–107)
CO2: 26 MMOL/L (ref 22–29)
CREAT SERPL-MCNC: 0.9 MG/DL (ref 0.7–1.2)
D DIMER: 313 NG/ML DDU
EOSINOPHILS ABSOLUTE: 0 E9/L (ref 0.05–0.5)
EOSINOPHILS RELATIVE PERCENT: 0 % (ref 0–6)
GFR AFRICAN AMERICAN: >60
GFR NON-AFRICAN AMERICAN: >60 ML/MIN/1.73
GLUCOSE BLD-MCNC: 242 MG/DL
GLUCOSE BLD-MCNC: 272 MG/DL (ref 74–99)
HCT VFR BLD CALC: 38.4 % (ref 37–54)
HEMOGLOBIN: 13 G/DL (ref 12.5–16.5)
IMMATURE GRANULOCYTES #: 0.02 E9/L
IMMATURE GRANULOCYTES %: 0.5 % (ref 0–5)
LACTIC ACID, SEPSIS: 1.4 MMOL/L (ref 0.5–1.9)
LYMPHOCYTES ABSOLUTE: 0.83 E9/L (ref 1.5–4)
LYMPHOCYTES RELATIVE PERCENT: 18.9 % (ref 20–42)
MAGNESIUM: 2 MG/DL (ref 1.6–2.6)
MCH RBC QN AUTO: 31.1 PG (ref 26–35)
MCHC RBC AUTO-ENTMCNC: 33.9 % (ref 32–34.5)
MCV RBC AUTO: 91.9 FL (ref 80–99.9)
METER GLUCOSE: 242 MG/DL (ref 74–99)
MONOCYTES ABSOLUTE: 0.45 E9/L (ref 0.1–0.95)
MONOCYTES RELATIVE PERCENT: 10.3 % (ref 2–12)
NEUTROPHILS ABSOLUTE: 3.07 E9/L (ref 1.8–7.3)
NEUTROPHILS RELATIVE PERCENT: 70.1 % (ref 43–80)
PDW BLD-RTO: 13.5 FL (ref 11.5–15)
PLATELET # BLD: 119 E9/L (ref 130–450)
PMV BLD AUTO: 10.7 FL (ref 7–12)
POTASSIUM SERPL-SCNC: 3.3 MMOL/L (ref 3.5–5)
RBC # BLD: 4.18 E12/L (ref 3.8–5.8)
SODIUM BLD-SCNC: 131 MMOL/L (ref 132–146)
TOTAL PROTEIN: 7 G/DL (ref 6.4–8.3)
TROPONIN: <0.01 NG/ML (ref 0–0.03)
WBC # BLD: 4.4 E9/L (ref 4.5–11.5)

## 2020-11-19 PROCEDURE — 2060000000 HC ICU INTERMEDIATE R&B

## 2020-11-19 PROCEDURE — 82728 ASSAY OF FERRITIN: CPT

## 2020-11-19 PROCEDURE — 36415 COLL VENOUS BLD VENIPUNCTURE: CPT

## 2020-11-19 PROCEDURE — 87040 BLOOD CULTURE FOR BACTERIA: CPT

## 2020-11-19 PROCEDURE — 82962 GLUCOSE BLOOD TEST: CPT

## 2020-11-19 PROCEDURE — 99285 EMERGENCY DEPT VISIT HI MDM: CPT

## 2020-11-19 PROCEDURE — 6360000002 HC RX W HCPCS: Performed by: INTERNAL MEDICINE

## 2020-11-19 PROCEDURE — 83615 LACTATE (LD) (LDH) ENZYME: CPT

## 2020-11-19 PROCEDURE — 83605 ASSAY OF LACTIC ACID: CPT

## 2020-11-19 PROCEDURE — 71045 X-RAY EXAM CHEST 1 VIEW: CPT

## 2020-11-19 PROCEDURE — 82306 VITAMIN D 25 HYDROXY: CPT

## 2020-11-19 PROCEDURE — 6370000000 HC RX 637 (ALT 250 FOR IP): Performed by: STUDENT IN AN ORGANIZED HEALTH CARE EDUCATION/TRAINING PROGRAM

## 2020-11-19 PROCEDURE — 84145 PROCALCITONIN (PCT): CPT

## 2020-11-19 PROCEDURE — 0202U NFCT DS 22 TRGT SARS-COV-2: CPT

## 2020-11-19 PROCEDURE — 80053 COMPREHEN METABOLIC PANEL: CPT

## 2020-11-19 PROCEDURE — 86140 C-REACTIVE PROTEIN: CPT

## 2020-11-19 PROCEDURE — 93005 ELECTROCARDIOGRAM TRACING: CPT | Performed by: STUDENT IN AN ORGANIZED HEALTH CARE EDUCATION/TRAINING PROGRAM

## 2020-11-19 PROCEDURE — 85730 THROMBOPLASTIN TIME PARTIAL: CPT

## 2020-11-19 PROCEDURE — 85025 COMPLETE CBC W/AUTO DIFF WBC: CPT

## 2020-11-19 PROCEDURE — 83735 ASSAY OF MAGNESIUM: CPT

## 2020-11-19 PROCEDURE — 85610 PROTHROMBIN TIME: CPT

## 2020-11-19 PROCEDURE — 2580000003 HC RX 258: Performed by: STUDENT IN AN ORGANIZED HEALTH CARE EDUCATION/TRAINING PROGRAM

## 2020-11-19 PROCEDURE — 85378 FIBRIN DEGRADE SEMIQUANT: CPT

## 2020-11-19 PROCEDURE — 6370000000 HC RX 637 (ALT 250 FOR IP): Performed by: INTERNAL MEDICINE

## 2020-11-19 PROCEDURE — 85384 FIBRINOGEN ACTIVITY: CPT

## 2020-11-19 PROCEDURE — 84484 ASSAY OF TROPONIN QUANT: CPT

## 2020-11-19 RX ORDER — GUAIFENESIN/DEXTROMETHORPHAN 100-10MG/5
5 SYRUP ORAL ONCE
Status: COMPLETED | OUTPATIENT
Start: 2020-11-19 | End: 2020-11-19

## 2020-11-19 RX ORDER — ROSUVASTATIN CALCIUM 10 MG/1
40 TABLET, COATED ORAL DAILY
Status: DISCONTINUED | OUTPATIENT
Start: 2020-11-20 | End: 2020-11-25 | Stop reason: HOSPADM

## 2020-11-19 RX ORDER — ACETAMINOPHEN 325 MG/1
650 TABLET ORAL EVERY 6 HOURS PRN
Status: DISCONTINUED | OUTPATIENT
Start: 2020-11-19 | End: 2020-11-19 | Stop reason: SDUPTHER

## 2020-11-19 RX ORDER — NICOTINE POLACRILEX 4 MG
15 LOZENGE BUCCAL PRN
Status: DISCONTINUED | OUTPATIENT
Start: 2020-11-19 | End: 2020-11-25 | Stop reason: HOSPADM

## 2020-11-19 RX ORDER — AMLODIPINE BESYLATE 5 MG/1
5 TABLET ORAL 2 TIMES DAILY
Status: DISCONTINUED | OUTPATIENT
Start: 2020-11-19 | End: 2020-11-25 | Stop reason: HOSPADM

## 2020-11-19 RX ORDER — INSULIN ASPART 100 [IU]/ML
28 INJECTION, SUSPENSION SUBCUTANEOUS
Status: DISCONTINUED | OUTPATIENT
Start: 2020-11-19 | End: 2020-11-19 | Stop reason: CLARIF

## 2020-11-19 RX ORDER — INSULIN GLARGINE 100 [IU]/ML
50 INJECTION, SOLUTION SUBCUTANEOUS 2 TIMES DAILY
Status: DISCONTINUED | OUTPATIENT
Start: 2020-11-19 | End: 2020-11-22

## 2020-11-19 RX ORDER — POLYETHYLENE GLYCOL 3350 17 G/17G
17 POWDER, FOR SOLUTION ORAL DAILY PRN
Status: DISCONTINUED | OUTPATIENT
Start: 2020-11-19 | End: 2020-11-25 | Stop reason: HOSPADM

## 2020-11-19 RX ORDER — ASPIRIN 81 MG/1
81 TABLET, CHEWABLE ORAL DAILY
Status: DISCONTINUED | OUTPATIENT
Start: 2020-11-20 | End: 2020-11-25 | Stop reason: HOSPADM

## 2020-11-19 RX ORDER — ISOSORBIDE MONONITRATE 30 MG/1
30 TABLET, EXTENDED RELEASE ORAL DAILY
Status: DISCONTINUED | OUTPATIENT
Start: 2020-11-20 | End: 2020-11-20

## 2020-11-19 RX ORDER — ACETAMINOPHEN 650 MG/1
650 SUPPOSITORY RECTAL EVERY 6 HOURS PRN
Status: DISCONTINUED | OUTPATIENT
Start: 2020-11-19 | End: 2020-11-19 | Stop reason: SDUPTHER

## 2020-11-19 RX ORDER — BENZONATATE 100 MG/1
100 CAPSULE ORAL 3 TIMES DAILY PRN
Status: DISCONTINUED | OUTPATIENT
Start: 2020-11-19 | End: 2020-11-25 | Stop reason: HOSPADM

## 2020-11-19 RX ORDER — DEXTROSE MONOHYDRATE 50 MG/ML
100 INJECTION, SOLUTION INTRAVENOUS PRN
Status: DISCONTINUED | OUTPATIENT
Start: 2020-11-19 | End: 2020-11-25 | Stop reason: HOSPADM

## 2020-11-19 RX ORDER — SODIUM CHLORIDE 0.9 % (FLUSH) 0.9 %
10 SYRINGE (ML) INJECTION PRN
Status: DISCONTINUED | OUTPATIENT
Start: 2020-11-19 | End: 2020-11-25 | Stop reason: HOSPADM

## 2020-11-19 RX ORDER — TAMSULOSIN HYDROCHLORIDE 0.4 MG/1
0.4 CAPSULE ORAL DAILY
Status: DISCONTINUED | OUTPATIENT
Start: 2020-11-20 | End: 2020-11-20

## 2020-11-19 RX ORDER — ACETAMINOPHEN 325 MG/1
650 TABLET ORAL EVERY 6 HOURS PRN
Status: DISCONTINUED | OUTPATIENT
Start: 2020-11-19 | End: 2020-11-25 | Stop reason: HOSPADM

## 2020-11-19 RX ORDER — CLOPIDOGREL BISULFATE 75 MG/1
75 TABLET ORAL DAILY
Status: DISCONTINUED | OUTPATIENT
Start: 2020-11-20 | End: 2020-11-25 | Stop reason: HOSPADM

## 2020-11-19 RX ORDER — POTASSIUM CHLORIDE 20 MEQ/1
40 TABLET, EXTENDED RELEASE ORAL ONCE
Status: COMPLETED | OUTPATIENT
Start: 2020-11-19 | End: 2020-11-19

## 2020-11-19 RX ORDER — ZINC GLUCONATE 50 MG
50 TABLET ORAL DAILY
Status: DISCONTINUED | OUTPATIENT
Start: 2020-11-20 | End: 2020-11-20

## 2020-11-19 RX ORDER — ACETAMINOPHEN 650 MG/1
650 SUPPOSITORY RECTAL EVERY 6 HOURS PRN
Status: DISCONTINUED | OUTPATIENT
Start: 2020-11-19 | End: 2020-11-25 | Stop reason: HOSPADM

## 2020-11-19 RX ORDER — 0.9 % SODIUM CHLORIDE 0.9 %
1000 INTRAVENOUS SOLUTION INTRAVENOUS ONCE
Status: COMPLETED | OUTPATIENT
Start: 2020-11-19 | End: 2020-11-19

## 2020-11-19 RX ORDER — LOSARTAN POTASSIUM 50 MG/1
100 TABLET ORAL DAILY
Status: DISCONTINUED | OUTPATIENT
Start: 2020-11-20 | End: 2020-11-25 | Stop reason: HOSPADM

## 2020-11-19 RX ORDER — PANTOPRAZOLE SODIUM 40 MG/1
40 TABLET, DELAYED RELEASE ORAL
Status: DISCONTINUED | OUTPATIENT
Start: 2020-11-20 | End: 2020-11-25 | Stop reason: HOSPADM

## 2020-11-19 RX ORDER — LANOLIN ALCOHOL/MO/W.PET/CERES
400 CREAM (GRAM) TOPICAL DAILY
Status: DISCONTINUED | OUTPATIENT
Start: 2020-11-20 | End: 2020-11-25 | Stop reason: HOSPADM

## 2020-11-19 RX ORDER — DEXTROSE MONOHYDRATE 25 G/50ML
12.5 INJECTION, SOLUTION INTRAVENOUS PRN
Status: DISCONTINUED | OUTPATIENT
Start: 2020-11-19 | End: 2020-11-25 | Stop reason: HOSPADM

## 2020-11-19 RX ORDER — SODIUM CHLORIDE 0.9 % (FLUSH) 0.9 %
10 SYRINGE (ML) INJECTION EVERY 12 HOURS SCHEDULED
Status: DISCONTINUED | OUTPATIENT
Start: 2020-11-19 | End: 2020-11-25 | Stop reason: HOSPADM

## 2020-11-19 RX ADMIN — BENZONATATE 100 MG: 100 CAPSULE ORAL at 21:13

## 2020-11-19 RX ADMIN — AMLODIPINE BESYLATE 5 MG: 5 TABLET ORAL at 23:51

## 2020-11-19 RX ADMIN — DEXAMETHASONE 6 MG: 4 TABLET ORAL at 21:13

## 2020-11-19 RX ADMIN — SODIUM CHLORIDE 1000 ML: 9 INJECTION, SOLUTION INTRAVENOUS at 16:37

## 2020-11-19 RX ADMIN — INSULIN GLARGINE 50 UNITS: 100 INJECTION, SOLUTION SUBCUTANEOUS at 23:46

## 2020-11-19 RX ADMIN — GUAIFENESIN AND DEXTROMETHORPHAN 5 ML: 100; 10 SYRUP ORAL at 16:37

## 2020-11-19 RX ADMIN — METOPROLOL TARTRATE 100 MG: 25 TABLET, FILM COATED ORAL at 22:51

## 2020-11-19 RX ADMIN — IPRATROPIUM BROMIDE AND ALBUTEROL 1 PUFF: 20; 100 SPRAY, METERED RESPIRATORY (INHALATION) at 21:50

## 2020-11-19 RX ADMIN — POTASSIUM CHLORIDE 40 MEQ: 1500 TABLET, EXTENDED RELEASE ORAL at 20:40

## 2020-11-19 RX ADMIN — ENOXAPARIN SODIUM 40 MG: 40 INJECTION SUBCUTANEOUS at 23:47

## 2020-11-19 ASSESSMENT — ENCOUNTER SYMPTOMS
BACK PAIN: 0
COLOR CHANGE: 0
COUGH: 1
WHEEZING: 0
SHORTNESS OF BREATH: 1
ANAL BLEEDING: 0

## 2020-11-20 PROBLEM — U07.1 COVID-19: Status: ACTIVE | Noted: 2020-11-20

## 2020-11-20 LAB
ADENOVIRUS BY PCR: NOT DETECTED
ALBUMIN SERPL-MCNC: 3.6 G/DL (ref 3.5–5.2)
ALP BLD-CCNC: 83 U/L (ref 40–129)
ALT SERPL-CCNC: 17 U/L (ref 0–40)
ANION GAP SERPL CALCULATED.3IONS-SCNC: 8 MMOL/L (ref 7–16)
APTT: 32.9 SEC (ref 24.5–35.1)
APTT: 36.8 SEC (ref 24.5–35.1)
AST SERPL-CCNC: 36 U/L (ref 0–39)
BASOPHILS ABSOLUTE: 0 E9/L (ref 0–0.2)
BASOPHILS RELATIVE PERCENT: 0 % (ref 0–2)
BILIRUB SERPL-MCNC: 0.4 MG/DL (ref 0–1.2)
BORDETELLA PARAPERTUSSIS BY PCR: NOT DETECTED
BORDETELLA PERTUSSIS BY PCR: NOT DETECTED
BUN BLDV-MCNC: 14 MG/DL (ref 8–23)
C-REACTIVE PROTEIN: 12.1 MG/DL (ref 0–0.4)
CALCIUM SERPL-MCNC: 8.8 MG/DL (ref 8.6–10.2)
CHLAMYDOPHILIA PNEUMONIAE BY PCR: NOT DETECTED
CHLORIDE BLD-SCNC: 100 MMOL/L (ref 98–107)
CHP ED QC CHECK: YES
CO2: 28 MMOL/L (ref 22–29)
CORONAVIRUS 229E BY PCR: NOT DETECTED
CORONAVIRUS HKU1 BY PCR: NOT DETECTED
CORONAVIRUS NL63 BY PCR: NOT DETECTED
CORONAVIRUS OC43 BY PCR: NOT DETECTED
CREAT SERPL-MCNC: 0.8 MG/DL (ref 0.7–1.2)
D DIMER: 340 NG/ML DDU
D DIMER: 444 NG/ML DDU
EKG ATRIAL RATE: 108 BPM
EKG P AXIS: 29 DEGREES
EKG P-R INTERVAL: 154 MS
EKG Q-T INTERVAL: 342 MS
EKG QRS DURATION: 124 MS
EKG QTC CALCULATION (BAZETT): 458 MS
EKG R AXIS: -52 DEGREES
EKG T AXIS: 43 DEGREES
EKG VENTRICULAR RATE: 108 BPM
EOSINOPHILS ABSOLUTE: 0 E9/L (ref 0.05–0.5)
EOSINOPHILS RELATIVE PERCENT: 0 % (ref 0–6)
FERRITIN: 320 NG/ML
FIBRINOGEN: >700 MG/DL (ref 225–540)
FIBRINOGEN: >700 MG/DL (ref 225–540)
GFR AFRICAN AMERICAN: >60
GFR NON-AFRICAN AMERICAN: >60 ML/MIN/1.73
GLUCOSE BLD-MCNC: 302 MG/DL
GLUCOSE BLD-MCNC: 327 MG/DL (ref 74–99)
HBA1C MFR BLD: 7.6 % (ref 4–5.6)
HCT VFR BLD CALC: 42.2 % (ref 37–54)
HEMOGLOBIN: 13.6 G/DL (ref 12.5–16.5)
HUMAN METAPNEUMOVIRUS BY PCR: NOT DETECTED
HUMAN RHINOVIRUS/ENTEROVIRUS BY PCR: NOT DETECTED
IMMATURE GRANULOCYTES #: 0.03 E9/L
IMMATURE GRANULOCYTES %: 0.8 % (ref 0–5)
INFLUENZA A BY PCR: NOT DETECTED
INFLUENZA B BY PCR: NOT DETECTED
INR BLD: 1.3
INR BLD: 1.4
L. PNEUMOPHILA SEROGP 1 UR AG: NORMAL
LACTATE DEHYDROGENASE: 186 U/L (ref 135–225)
LACTIC ACID: 1.1 MMOL/L (ref 0.5–2.2)
LYMPHOCYTES ABSOLUTE: 0.69 E9/L (ref 1.5–4)
LYMPHOCYTES RELATIVE PERCENT: 18.4 % (ref 20–42)
MAGNESIUM: 2.4 MG/DL (ref 1.6–2.6)
MCH RBC QN AUTO: 31 PG (ref 26–35)
MCHC RBC AUTO-ENTMCNC: 32.2 % (ref 32–34.5)
MCV RBC AUTO: 96.1 FL (ref 80–99.9)
METER GLUCOSE: 302 MG/DL (ref 74–99)
METER GLUCOSE: 304 MG/DL (ref 74–99)
METER GLUCOSE: 372 MG/DL (ref 74–99)
METER GLUCOSE: 379 MG/DL (ref 74–99)
MONOCYTES ABSOLUTE: 0.19 E9/L (ref 0.1–0.95)
MONOCYTES RELATIVE PERCENT: 5.1 % (ref 2–12)
MYCOPLASMA PNEUMONIAE BY PCR: NOT DETECTED
NEUTROPHILS ABSOLUTE: 2.84 E9/L (ref 1.8–7.3)
NEUTROPHILS RELATIVE PERCENT: 75.7 % (ref 43–80)
PARAINFLUENZA VIRUS 1 BY PCR: NOT DETECTED
PARAINFLUENZA VIRUS 2 BY PCR: NOT DETECTED
PARAINFLUENZA VIRUS 3 BY PCR: NOT DETECTED
PARAINFLUENZA VIRUS 4 BY PCR: NOT DETECTED
PDW BLD-RTO: 13.8 FL (ref 11.5–15)
PHOSPHORUS: 3.4 MG/DL (ref 2.5–4.5)
PLATELET # BLD: 134 E9/L (ref 130–450)
PMV BLD AUTO: 10.5 FL (ref 7–12)
POTASSIUM SERPL-SCNC: 4.4 MMOL/L (ref 3.5–5)
PROCALCITONIN: 0.14 NG/ML (ref 0–0.08)
PROTHROMBIN TIME: 14.4 SEC (ref 9.3–12.4)
PROTHROMBIN TIME: 15.7 SEC (ref 9.3–12.4)
RBC # BLD: 4.39 E12/L (ref 3.8–5.8)
RESPIRATORY SYNCYTIAL VIRUS BY PCR: NOT DETECTED
SARS-COV-2, PCR: DETECTED
SODIUM BLD-SCNC: 136 MMOL/L (ref 132–146)
STREP PNEUMONIAE ANTIGEN, URINE: NORMAL
TOTAL PROTEIN: 7.6 G/DL (ref 6.4–8.3)
TROPONIN: <0.01 NG/ML (ref 0–0.03)
TSH SERPL DL<=0.05 MIU/L-ACNC: 0.27 UIU/ML (ref 0.27–4.2)
VITAMIN D 25-HYDROXY: 22 NG/ML (ref 30–100)
WBC # BLD: 3.8 E9/L (ref 4.5–11.5)

## 2020-11-20 PROCEDURE — 87070 CULTURE OTHR SPECIMN AEROBIC: CPT

## 2020-11-20 PROCEDURE — 85610 PROTHROMBIN TIME: CPT

## 2020-11-20 PROCEDURE — 85730 THROMBOPLASTIN TIME PARTIAL: CPT

## 2020-11-20 PROCEDURE — 87040 BLOOD CULTURE FOR BACTERIA: CPT

## 2020-11-20 PROCEDURE — 87450 HC DIRECT STREP B ANTIGEN: CPT

## 2020-11-20 PROCEDURE — 6360000002 HC RX W HCPCS: Performed by: INTERNAL MEDICINE

## 2020-11-20 PROCEDURE — 93010 ELECTROCARDIOGRAM REPORT: CPT | Performed by: INTERNAL MEDICINE

## 2020-11-20 PROCEDURE — 6370000000 HC RX 637 (ALT 250 FOR IP): Performed by: INTERNAL MEDICINE

## 2020-11-20 PROCEDURE — 82962 GLUCOSE BLOOD TEST: CPT

## 2020-11-20 PROCEDURE — 85025 COMPLETE CBC W/AUTO DIFF WBC: CPT

## 2020-11-20 PROCEDURE — 2060000000 HC ICU INTERMEDIATE R&B

## 2020-11-20 PROCEDURE — 85384 FIBRINOGEN ACTIVITY: CPT

## 2020-11-20 PROCEDURE — 83735 ASSAY OF MAGNESIUM: CPT

## 2020-11-20 PROCEDURE — 85378 FIBRIN DEGRADE SEMIQUANT: CPT

## 2020-11-20 PROCEDURE — 36415 COLL VENOUS BLD VENIPUNCTURE: CPT

## 2020-11-20 PROCEDURE — 80053 COMPREHEN METABOLIC PANEL: CPT

## 2020-11-20 PROCEDURE — 87449 NOS EACH ORGANISM AG IA: CPT

## 2020-11-20 PROCEDURE — 84100 ASSAY OF PHOSPHORUS: CPT

## 2020-11-20 PROCEDURE — 2580000003 HC RX 258: Performed by: INTERNAL MEDICINE

## 2020-11-20 PROCEDURE — 84443 ASSAY THYROID STIM HORMONE: CPT

## 2020-11-20 PROCEDURE — 83036 HEMOGLOBIN GLYCOSYLATED A1C: CPT

## 2020-11-20 PROCEDURE — 94640 AIRWAY INHALATION TREATMENT: CPT

## 2020-11-20 RX ORDER — GUAIFENESIN 600 MG/1
600 TABLET, EXTENDED RELEASE ORAL 2 TIMES DAILY
Status: DISCONTINUED | OUTPATIENT
Start: 2020-11-20 | End: 2020-11-25 | Stop reason: HOSPADM

## 2020-11-20 RX ORDER — DULOXETIN HYDROCHLORIDE 60 MG/1
30 CAPSULE, DELAYED RELEASE ORAL 2 TIMES DAILY
COMMUNITY

## 2020-11-20 RX ORDER — GLIPIZIDE 5 MG/1
10 TABLET ORAL
COMMUNITY

## 2020-11-20 RX ORDER — PHENOL 1.4 %
20 AEROSOL, SPRAY (ML) MUCOUS MEMBRANE NIGHTLY
COMMUNITY

## 2020-11-20 RX ORDER — ERGOCALCIFEROL 1.25 MG/1
50000 CAPSULE ORAL WEEKLY
Status: DISCONTINUED | OUTPATIENT
Start: 2020-11-20 | End: 2020-11-25 | Stop reason: HOSPADM

## 2020-11-20 RX ORDER — LANOLIN ALCOHOL/MO/W.PET/CERES
500 CREAM (GRAM) TOPICAL NIGHTLY
Status: DISCONTINUED | OUTPATIENT
Start: 2020-11-20 | End: 2020-11-25 | Stop reason: HOSPADM

## 2020-11-20 RX ORDER — VITAMIN E 268 MG
400 CAPSULE ORAL 2 TIMES DAILY
Status: DISCONTINUED | OUTPATIENT
Start: 2020-11-20 | End: 2020-11-25 | Stop reason: HOSPADM

## 2020-11-20 RX ORDER — ZINC SULFATE 50(220)MG
50 CAPSULE ORAL DAILY
Status: DISCONTINUED | OUTPATIENT
Start: 2020-11-20 | End: 2020-11-25 | Stop reason: HOSPADM

## 2020-11-20 RX ORDER — TAMSULOSIN HYDROCHLORIDE 0.4 MG/1
0.4 CAPSULE ORAL NIGHTLY
Status: DISCONTINUED | OUTPATIENT
Start: 2020-11-21 | End: 2020-11-25 | Stop reason: HOSPADM

## 2020-11-20 RX ORDER — METOPROLOL SUCCINATE 50 MG/1
100 TABLET, EXTENDED RELEASE ORAL 2 TIMES DAILY
Status: DISCONTINUED | OUTPATIENT
Start: 2020-11-20 | End: 2020-11-25 | Stop reason: HOSPADM

## 2020-11-20 RX ORDER — MULTIVIT WITH MINERALS/LUTEIN
1000 TABLET ORAL 2 TIMES DAILY
Status: ON HOLD | COMMUNITY
End: 2020-11-25 | Stop reason: SDUPTHER

## 2020-11-20 RX ORDER — LOPERAMIDE HYDROCHLORIDE 2 MG/1
2 CAPSULE ORAL 4 TIMES DAILY PRN
Status: DISCONTINUED | OUTPATIENT
Start: 2020-11-20 | End: 2020-11-25 | Stop reason: HOSPADM

## 2020-11-20 RX ORDER — METOPROLOL SUCCINATE 100 MG/1
100 TABLET, EXTENDED RELEASE ORAL 2 TIMES DAILY
COMMUNITY

## 2020-11-20 RX ORDER — INSULIN ASPART 100 [IU]/ML
14 INJECTION, SUSPENSION SUBCUTANEOUS
Status: DISCONTINUED | OUTPATIENT
Start: 2020-11-20 | End: 2020-11-20 | Stop reason: CLARIF

## 2020-11-20 RX ORDER — DULOXETIN HYDROCHLORIDE 60 MG/1
60 CAPSULE, DELAYED RELEASE ORAL NIGHTLY
Status: DISCONTINUED | OUTPATIENT
Start: 2020-11-20 | End: 2020-11-25 | Stop reason: HOSPADM

## 2020-11-20 RX ORDER — POLYVINYL ALCOHOL 14 MG/ML
1 SOLUTION/ DROPS OPHTHALMIC PRN
Status: DISCONTINUED | OUTPATIENT
Start: 2020-11-20 | End: 2020-11-25 | Stop reason: HOSPADM

## 2020-11-20 RX ADMIN — TAMSULOSIN HYDROCHLORIDE 0.4 MG: 0.4 CAPSULE ORAL at 09:43

## 2020-11-20 RX ADMIN — LOPERAMIDE HYDROCHLORIDE 2 MG: 2 CAPSULE ORAL at 22:23

## 2020-11-20 RX ADMIN — ZINC SULFATE 220 MG (50 MG) CAPSULE 50 MG: CAPSULE at 17:48

## 2020-11-20 RX ADMIN — Medication 10 ML: at 22:16

## 2020-11-20 RX ADMIN — METOPROLOL TARTRATE 100 MG: 25 TABLET, FILM COATED ORAL at 09:43

## 2020-11-20 RX ADMIN — Medication 50 MG: at 11:30

## 2020-11-20 RX ADMIN — Medication 400 UNITS: at 21:14

## 2020-11-20 RX ADMIN — ROSUVASTATIN CALCIUM 40 MG: 10 TABLET, FILM COATED ORAL at 11:30

## 2020-11-20 RX ADMIN — ASPIRIN 81 MG CHEWABLE TABLET 81 MG: 81 TABLET CHEWABLE at 09:43

## 2020-11-20 RX ADMIN — IPRATROPIUM BROMIDE AND ALBUTEROL 1 PUFF: 20; 100 SPRAY, METERED RESPIRATORY (INHALATION) at 09:24

## 2020-11-20 RX ADMIN — Medication 500 MG: at 21:14

## 2020-11-20 RX ADMIN — ERGOCALCIFEROL 50000 UNITS: 1.25 CAPSULE ORAL at 17:48

## 2020-11-20 RX ADMIN — DULOXETINE HYDROCHLORIDE 60 MG: 60 CAPSULE, DELAYED RELEASE ORAL at 21:14

## 2020-11-20 RX ADMIN — CLOPIDOGREL BISULFATE 75 MG: 75 TABLET ORAL at 09:44

## 2020-11-20 RX ADMIN — INSULIN LISPRO 30 UNITS: 100 INJECTION, SUSPENSION SUBCUTANEOUS at 16:44

## 2020-11-20 RX ADMIN — GUAIFENESIN 600 MG: 600 TABLET, EXTENDED RELEASE ORAL at 21:15

## 2020-11-20 RX ADMIN — INSULIN LISPRO 28 UNITS: 100 INJECTION, SUSPENSION SUBCUTANEOUS at 08:02

## 2020-11-20 RX ADMIN — INSULIN GLARGINE 50 UNITS: 100 INJECTION, SOLUTION SUBCUTANEOUS at 09:49

## 2020-11-20 RX ADMIN — IPRATROPIUM BROMIDE AND ALBUTEROL 1 PUFF: 20; 100 SPRAY, METERED RESPIRATORY (INHALATION) at 03:39

## 2020-11-20 RX ADMIN — ENOXAPARIN SODIUM 40 MG: 40 INJECTION SUBCUTANEOUS at 09:44

## 2020-11-20 RX ADMIN — Medication 10 ML: at 11:31

## 2020-11-20 RX ADMIN — IPRATROPIUM BROMIDE AND ALBUTEROL 1 PUFF: 20; 100 SPRAY, METERED RESPIRATORY (INHALATION) at 22:19

## 2020-11-20 RX ADMIN — LOSARTAN POTASSIUM 100 MG: 50 TABLET, FILM COATED ORAL at 11:30

## 2020-11-20 RX ADMIN — ISOSORBIDE MONONITRATE 30 MG: 30 TABLET, EXTENDED RELEASE ORAL at 09:44

## 2020-11-20 RX ADMIN — INSULIN LISPRO 20 UNITS: 100 INJECTION, SUSPENSION SUBCUTANEOUS at 13:43

## 2020-11-20 RX ADMIN — AMLODIPINE BESYLATE 5 MG: 5 TABLET ORAL at 21:15

## 2020-11-20 RX ADMIN — INSULIN GLARGINE 50 UNITS: 100 INJECTION, SOLUTION SUBCUTANEOUS at 21:16

## 2020-11-20 RX ADMIN — ENOXAPARIN SODIUM 40 MG: 40 INJECTION SUBCUTANEOUS at 21:16

## 2020-11-20 RX ADMIN — Medication 400 MG: at 09:44

## 2020-11-20 RX ADMIN — METOPROLOL SUCCINATE 100 MG: 50 TABLET, EXTENDED RELEASE ORAL at 21:14

## 2020-11-20 RX ADMIN — DEXAMETHASONE 6 MG: 4 TABLET ORAL at 09:43

## 2020-11-20 RX ADMIN — AMLODIPINE BESYLATE 5 MG: 5 TABLET ORAL at 09:44

## 2020-11-20 RX ADMIN — PANTOPRAZOLE SODIUM 40 MG: 40 TABLET, DELAYED RELEASE ORAL at 06:42

## 2020-11-20 ASSESSMENT — PAIN SCALES - GENERAL
PAINLEVEL_OUTOF10: 0
PAINLEVEL_OUTOF10: 0

## 2020-11-20 NOTE — ACP (ADVANCE CARE PLANNING)
Advance Care Planning     Advance Care Planning Activator (Inpatient)  Conversation Note      Date of ACP Conversation: 11/19/2020    Conversation Conducted with: Patient with Decision Making Capacity    ACP Activator: [address] Decision Maker:     Current Designated Health Care Decision Maker:   Primary Decision Maker: Denae Khan - Spouse - [phone]    Secondary Decision Maker: Jaswinder Grace Child - [phone]     Care Preferences    Ventilation: \"If you were in your present state of health and suddenly became very ill and were unable to breathe on your own, what would your preference be about the use of a ventilator (breathing machine) if it were available to you? \"  yes    Would the patient desire the use of ventilator (breathing machine)?: yes    \"If your health worsens and it becomes clear that your chance of recovery is unlikely, what would your preference be about the use of a ventilator (breathing machine) if it were available to you? \"     Would the patient desire the use of ventilator (breathing machine)?: Yes      Resuscitation  \"CPR works best to restart the heart when there is a sudden event, like a heart attack, in someone who is otherwise healthy. Unfortunately, CPR does not typically restart the heart for people who have serious health conditions or who are very sick. \"    \"In the event your heart stopped as a result of an underlying serious health condition, would you want attempts to be made to restart your heart? YES       [x] Yes   [] No   Educated Patient / Trupti Huggins regarding differences between Advance Directives and portable DNR orders.     Length of ACP Conversation in minutes:      Conversation Outcomes:  [x] ACP discussion completed  [] Existing advance directive reviewed with patient; no changes to patient's previously recorded wishes  [] New Advance Directive completed  [] Portable Do Not Rescitate prepared for Provider review and signature  [] POLST/POST/MOLST/MOST prepared for Provider review and signature      Follow-up plan:    [] Schedule follow-up conversation to continue planning  [] Referred individual to Provider for additional questions/concerns   [] Advised patient/agent/surrogate to review completed ACP document and update if needed with changes in condition, patient preferences or care setting    [x] This note routed to one or more involved healthcare providers  Electronically signed by JOSE Parekh on 11/20/2020 at 11:48 AM

## 2020-11-20 NOTE — ED NOTES
Pt SPO2 86% on room air at rest. Ambulation 84%. Pt placed back onto O2 via NC at 3L at this time. SPO2 WNL.       Esme Guzman RN  11/19/20 [facility] [address], RN  11/19/20 1957

## 2020-11-20 NOTE — CARE COORDINATION
SS Note: COVID POSITIVE 11/19/20. VIRAL Camarena completed ACP. SW attempted to called pt in room for transition of care assessment however no answer. SW called pt's wife and left voicemail. Per review of flowsheets in chart pt is independent and on 5 liters presently. Per 8/2020 assessment pt resides with wife and was independent including driving at that time and used cane for ambulation. SW/VIRAL will continue to follow for transition of care planning.    Electronically signed by IFEANYI Cortes on 11/20/2020 at 4:43 PM

## 2020-11-20 NOTE — H&P
Department of Internal Medicine  History and Physical    PCP: Dr. Susan García   Admitting Physician: Dr. Lisa Noriega  Consultants: Dr. Caraballo Ang:  Shortness of breath    HISTORY OF PRESENT ILLNESS:    Patient is a 79-year-old male who presented to the ED due to shortness of breath. He has been having progressive shortness of breath for the past 5 days. Shortness of breath is present sometimes at rest but mainly with exertion. He has associated nonproductive cough. He does have wheezing as well. He has been experiencing fever and chills and decreased appetite. He is on tolerating a liquid diet for now.      1/21/2020 echocardiogram   Ejection fraction is visually estimated at 45%. Trileaflet aortic valve. Normal leaflet mobility. No aortic stenosis. No aortic regurgitation. Mild left ventricular concentric hypertrophy noted. Ejection fraction is visually estimated at 45%. E/A flow reversal noted. Suggestive of diastolic dysfunction. Mildly dilated aortic root. Physiologic and/or trace mitral regurgitation is present. No evidence for hemodynamically significant pericardial effusion. Normal right atrium. Mildly, Moderately dilated right ventricle. RVSP is 13 mmHg. Physiologic and/or trace tricuspid regurgitation. PAST MEDICAL Hx:  Past Medical History:   Diagnosis Date    Arthritis     CAD (coronary artery disease) 3/20/2014    [facility]     skin-x2 (ear-Dr. Pavan Chen)    Chronic fatigue fibromyalgia syndrome     Deafness in right ear     Diabetes mellitus ([address]     type 2    GERD (gastroesophageal reflux disease)     Hypertension     Morbid obesity ([address]     Neuromuscular disorder ([address]     Non-ST elevation MI (NSTEMI) ([facility] 10/2015    patient was transported to [facility] due to aversion from [address] Hx:   Past Surgical History:   Procedure Laterality Date    CARDIAC CATHETERIZATION  3 11 14    Mildl to moderate CAD.  Normal left venricular size and systolic functiion. Systemic hypertension. [facility]    COLONOSCOPY      CORONARY ANGIOPLASTY  10/2015    Dr. Diego Glaser MID/DISTAL CX. PCI of the mid and distal dx artery with [employer] ZACARIAS with MATT grade III flow    DIAGNOSTIC CARDIAC CATH LAB PROCEDURE  02/15/2016    PATENT STENT IN CX, LAD 30%, RCA 40% AT [facility]. EF 60% EF    ENDOSCOPY, COLON, DIAGNOSTIC      GALLBLADDER SURGERY  1982    In [facility] Right     SHOULDER ARTHROSCOPY      SKIN CANCER EXCISION  01/2020    x2-ear-Dr. Anna Sanchez    TUMOR REMOVAL  bladder tumor    UPPER GASTROINTESTINAL ENDOSCOPY N/A 8/7/2020    EGD BIOPSY performed by Zechariah Frazier DO at [facility] Hx:  Family History   Adopted: Yes   Family history unknown: Yes       HOME MEDICATIONS:  Prior to Admission medications    Medication Sig Start Date End Date Taking?  Authorizing Provider   Ascorbic Acid (VITAMIN C) 250 MG tablet Take 4 tablets by mouth 2 times daily 8/7/20   Jacqualine Callopez Simmonsel, DO   pantoprazole (PROTONIX) 40 MG tablet Take 1 tablet by mouth every morning (before breakfast) 8/8/20   Jacqualine Callopez Herrera, DO   amLODIPine (NORVASC) 5 MG tablet Take 1 tablet by mouth 2 times daily 8/7/20   Jacqualine Calkin Troyel, DO   losartan (COZAAR) 100 MG tablet Take 1 tablet by mouth daily 8/8/20   Simbaqualine Callopez Herrera, DO   isosorbide mononitrate (IMDUR) 30 MG extended release tablet Take 1 tablet by mouth daily 8/7/20   Alexis Herrera,    tamsulosin (FLOMAX) 0.4 MG capsule Take 0.4 mg by mouth daily    Historical Provider, MD   Insulin Aspart Prot & Aspart (INSULIN ASP PROT & ASP FLEXPEN) (70-30) 100 UNIT/ML SUPN Inject 14 Units into the skin Daily with lunch    Historical Provider, MD   Insulin Aspart Prot & Aspart (INSULIN ASP PROT & ASP FLEXPEN) (70-30) 100 UNIT/ML SUPN Inject 28 Units into the skin Daily with supper    Historical Provider, MD   glipiZIDE (GLUCOTROL) 10 MG tablet Take 10 mg by mouth 4 times daily     Historical Provider, MD   metoprolol (LOPRESSOR) 100 MG tablet Take 100 mg by mouth 2 times daily    Historical Provider, MD   insulin 70-30 (HUMULIN 70/30) (70-30) 100 UNIT per ML injection vial Inject 28 Units into the skin every morning     Historical Provider, MD   insulin glargine (LANTUS) 100 UNIT/ML injection vial Inject 50 Units into the skin 2 times daily    Historical Provider, MD   MELATONIN ER PO Take 20 mg by mouth daily     Historical Provider, MD   zinc gluconate 50 MG tablet Take 50 mg by mouth daily    Historical Provider, MD   magnesium oxide (MAG-OX) 400 MG tablet Take 400 mg by mouth daily    Historical Provider, MD   clopidogrel (PLAVIX) 75 MG tablet Take 75 mg by mouth daily  3/27/17   Historical Provider, MD   rosuvastatin (CRESTOR) 40 MG tablet 40 mg daily  4/28/17   Historical Provider, MD   aspirin 81 MG tablet Take 81 mg by mouth daily. Historical Provider, MD   sitaGLIPtan (JANUVIA) 100 MG tablet Take 100 mg by mouth daily. Historical Provider, MD       ALLERGIES:  Poison ivy extract;  Iodine; and Metformin and related    SOCIAL Hx:  Social History     Socioeconomic History    Marital status:      Spouse name: Not on file    Number of children: Not on file    Years of education: Not on file    Highest education level: Not on file   Occupational History    Not on file   Social Needs    Financial resource strain: Not on file    Food insecurity     Worry: Not on file     Inability: Not on file    Transportation needs     Medical: Not on file     Non-medical: Not on file   Tobacco Use    Smoking status: Never Smoker    Smokeless tobacco: Never Used   Substance and Sexual Activity    Alcohol use: No     Comment: 1 cup coffee daily    Drug use: No    Sexual activity: Never   Lifestyle    Physical activity     Days per week: Not on file     Minutes per session: Not on file    Stress: Not on file   Relationships    Social connections     Talks on phone: Not on file     Gets together: Not on file     Attends Alevism service: Not on file     Active member of club or organization: Not on file     Attends meetings of clubs or organizations: Not on file     Relationship status: Not on file    Intimate partner violence     Fear of current or ex partner: Not on file     Emotionally abused: Not on file     Physically abused: Not on file     Forced sexual activity: Not on file   Other Topics Concern    Not on file   Social History Narrative    Not on file       ROS: Positive in bold  General:   Denies chills, fatigue, fever, malaise, night sweats or weight loss    Psychological:   Denies anxiety, disorientation or hallucinations    ENT:    Denies epistaxis, headaches, vertigo or visual changes    Cardiovascular:   Denies any chest pain, irregular heartbeats, or palpitations. No paroxysmal nocturnal dyspnea. Respiratory:   Denies shortness of breath, coughing, sputum production, hemoptysis, or wheezing. No orthopnea. Gastrointestinal:   Denies nausea, vomiting, diarrhea, or constipation. Denies any abdominal pain. Denies change in bowel habits or stools. Genito-Urinary:    Denies any urgency, frequency, hematuria. Voiding without difficulty. Musculoskeletal:   Denies joint pain, joint stiffness, joint swelling or muscle pain    Neurology:    Denies any headache or focal neurological deficits. No weakness or paresthesia. Derm:    Denies any rashes, ulcers, or excoriations. Denies bruising. Extremities:   Denies any lower extremity swelling or edema. PHYSICAL EXAM:  VITALS:  Vitals:    11/19/20 1957   BP: (!) 154/87   Pulse:    Resp:    Temp:    SpO2:          CONSTITUTIONAL:    Awake, alert, cooperative, no apparent distress, and appears stated age    EYES:    PERRL, EOMI, sclera clear, conjunctiva normal    ENT:    Normocephalic, atraumatic, sinuses nontender on palpation. External ears without lesions.  Oral pharynx with moist mucus membranes. Tonsils without erythema or exudates. NECK:    Supple, symmetrical, trachea midline, no adenopathy, thyroid symmetric, not enlarged and no tenderness, skin normal, no bruits, no JVD    HEMATOLOGIC/LYMPHATICS:    No cervical lymphadenopathy and no supraclavicular lymphadenopathy    LUNGS:    Bilateral diminished breath sounds and scattered wheezes    CARDIOVASCULAR:    Normal apical impulse, regular rate and rhythm, normal S1 and S2, no S3 or S4, and no murmur noted    ABDOMEN:    No scars, normal bowel sounds, soft, non-distended, non-tender, no masses palpated, no hepatosplenomegaly, no rebound or guarding elicited on palpation     MUSCULOSKELETAL:    There is no redness, warmth, or swelling of the joints. Full range of motion noted. Motor strength is 5 out of 5 all extremities bilaterally. Tone is normal.    NEUROLOGIC:    Awake, alert, oriented to name, place and time. Cranial nerves II-XII are grossly intact. Motor is 5 out of 5 bilaterally. SKIN:    No bruising or bleeding. No redness, warmth, or swelling    EXTREMITIES:    Peripheral pulses present. No edema, cyanosis, or swelling. OSTEOPATHIC:    Examined in seated and supine positions. Normal thoracic kyphosis and lumbar lordosis. No acute somatic dysfunction.     LABORATORY DATA:  CBC with Differential:    Lab Results   Component Value Date    WBC 4.4 11/19/2020    RBC 4.18 11/19/2020    HGB 13.0 11/19/2020    HCT 38.4 11/19/2020     11/19/2020    MCV 91.9 11/19/2020    MCH 31.1 11/19/2020    MCHC 33.9 11/19/2020    RDW 13.5 11/19/2020    SEGSPCT 76 03/11/2014    LYMPHOPCT 18.9 11/19/2020    MONOPCT 10.3 11/19/2020    BASOPCT 0.2 11/19/2020    MONOSABS 0.45 11/19/2020    LYMPHSABS 0.83 11/19/2020    EOSABS 0.00 11/19/2020    BASOSABS 0.01 11/19/2020     CMP:    Lab Results   Component Value Date     11/19/2020    K 3.3 11/19/2020    CL 95 11/19/2020    CO2 26 11/19/2020    BUN 14 11/19/2020    CREATININE 0.9

## 2020-11-20 NOTE — ED PROVIDER NOTES
Patient is a 68-year-old male presents the ER today with concern for COVID-19. Patient states that he is been having flulike symptoms for approximately 1 week. States that he is been having a cough and shortness of breath which is been progressively worsening. He was seen outpatient today and had a Covid test done but the results were not back yet. States that his shortness of breath and cough have gotten progressively worse and worsened and became unbearable today so decided to come to the ER for further evaluation. Exacerbating factors include exertion. He denies any remitting factors. Symptoms are moderate in severity. Endorses myalgias. Denies any headache, dizziness, chest pain, abdominal pain, diarrhea, constipation, urinary symptoms. The history is provided by the patient. Review of Systems   Constitutional: Positive for fatigue. Negative for chills, diaphoresis and fever. HENT: Negative for congestion, drooling and rhinorrhea. Eyes: Negative for visual disturbance. Respiratory: Positive for cough and shortness of breath. Negative for wheezing. Cardiovascular: Negative for chest pain and palpitations. Gastrointestinal: Negative for abdominal distention, abdominal pain, anal bleeding, diarrhea, nausea and vomiting. Genitourinary: Negative for dysuria and frequency. Musculoskeletal: Negative for arthralgias, back pain and myalgias. Skin: Negative for color change and wound. Neurological: Positive for headaches. Negative for dizziness, syncope and light-headedness. Hematological: Does not bruise/bleed easily. Psychiatric/Behavioral: Negative for confusion. Physical Exam  Constitutional:       General: He is not in acute distress. Appearance: He is obese. He is not diaphoretic. HENT:      Head: Normocephalic and atraumatic.       Right Ear: External ear normal.      Left Ear: External ear normal.      Nose: Nose normal.      Mouth/Throat:      Mouth: Mucous membranes are moist.      Pharynx: Oropharynx is clear. Eyes:      Extraocular Movements: Extraocular movements intact. Conjunctiva/sclera: Conjunctivae normal.   Neck:      Musculoskeletal: Normal range of motion and neck supple. Cardiovascular:      Rate and Rhythm: Normal rate and regular rhythm. Pulses: Normal pulses. Heart sounds: Normal heart sounds. Pulmonary:      Effort: Pulmonary effort is normal.      Breath sounds: Normal breath sounds. Abdominal:      Palpations: Abdomen is soft. Tenderness: There is no abdominal tenderness. There is no guarding or rebound. Musculoskeletal: Normal range of motion. Skin:     General: Skin is warm and dry. Neurological:      General: No focal deficit present. Mental Status: He is alert and oriented to person, place, and time. Psychiatric:         Mood and Affect: Mood normal.         Behavior: Behavior normal.         Thought Content: Thought content normal.         Judgment: Judgment normal.          Procedures     MDM  Number of Diagnoses or Management Options  Suspected 2019 novel coronavirus infection:   Diagnosis management comments: Patient presented today with concern for Covid, worsening flulike symptoms, cough, shortness of breath. Patient is lymphopenic is consistent with COVID-19, did have an elevated D-dimer of 313, however he is order to IV dye I feel he may benefit from a perfusion scan once admitted. Patient did desaturate on ambulation 86% on room air. Patient is on oxygen at baseline, patient is satting very comfortably in the upper 90s on 2 L. Did speak to Dr. Rao Avery who will admit the patient. Patient is agreeable to this plan as well. All questions were answered.                 --------------------------------------------- PAST HISTORY ---------------------------------------------  Past Medical History:  has a past medical history of Arthritis, CAD (coronary artery disease), [facility] Chronic fatigue fibromyalgia syndrome, Deafness in right ear, Diabetes mellitus ([facility] GERD (gastroesophageal reflux disease), Hypertension, Morbid obesity ([facility] and Non-ST elevation MI (NSTEMI) [facility] Past Surgical History:  has a past surgical history that includes Shoulder arthroscopy; Gallbladder surgery (1982); tumor removal (bladder tumor); Rotator cuff repair (Right); Endoscopy, colon, diagnostic; Colonoscopy; Cardiac catheterization (3 11 14); Coronary angioplasty (10/2015); Diagnostic Cardiac Cath Lab Procedure (02/15/2016); Skin cancer excision (01/2020); Cataract removal (Bilateral); and Upper gastrointestinal endoscopy (N/A, 8/7/2020). Social History:  reports that he has never smoked. He has never used smokeless tobacco. He reports that he does not drink alcohol or use drugs. Family History: He was adopted. Family history is unknown by patient. The patients home medications have been reviewed. Allergies: Poison ivy extract;  Iodine; and Metformin and related    -------------------------------------------------- RESULTS -------------------------------------------------    LABS:  Results for orders placed or performed during the hospital encounter of 11/19/20   Lactate, Sepsis   Result Value Ref Range    Lactic Acid, Sepsis 1.4 0.5 - 1.9 mmol/L   CBC auto differential   Result Value Ref Range    WBC 4.4 (L) 4.5 - 11.5 E9/L    RBC 4.18 3.80 - 5.80 E12/L    Hemoglobin 13.0 12.5 - 16.5 g/dL    Hematocrit 38.4 37.0 - 54.0 %    MCV 91.9 80.0 - 99.9 fL    MCH 31.1 26.0 - 35.0 pg    MCHC 33.9 32.0 - 34.5 %    RDW 13.5 11.5 - 15.0 fL    Platelets 113 (L) 044 - 450 E9/L    MPV 10.7 7.0 - 12.0 fL    Neutrophils % 70.1 43.0 - 80.0 %    Immature Granulocytes % 0.5 0.0 - 5.0 %    Lymphocytes % 18.9 (L) 20.0 - 42.0 %    Monocytes % 10.3 2.0 - 12.0 %    Eosinophils % 0.0 0.0 - 6.0 %    Basophils % 0.2 0.0 - 2.0 %    Neutrophils Absolute 3.07 1.80 - 7.30 E9/L    Immature Granulocytes # 0.02 E9/L    Lymphocytes Absolute 0.83 (L) 1.50 - 4.00 E9/L    Monocytes Absolute 0.45 0.10 - 0.95 E9/L    Eosinophils Absolute 0.00 (L) 0.05 - 0.50 E9/L    Basophils Absolute 0.01 0.00 - 0.20 E9/L   Comprehensive Metabolic Panel   Result Value Ref Range    Sodium 131 (L) 132 - 146 mmol/L    Potassium 3.3 (L) 3.5 - 5.0 mmol/L    Chloride 95 (L) 98 - 107 mmol/L    CO2 26 22 - 29 mmol/L    Anion Gap 10 7 - 16 mmol/L    Glucose 272 (H) 74 - 99 mg/dL    BUN 14 8 - 23 mg/dL    CREATININE 0.9 0.7 - 1.2 mg/dL    GFR Non-African American >60 >=60 mL/min/1.73    GFR African American >60     Calcium 8.4 (L) 8.6 - 10.2 mg/dL    Total Protein 7.0 6.4 - 8.3 g/dL    Alb 3.4 (L) 3.5 - 5.2 g/dL    Total Bilirubin 0.6 0.0 - 1.2 mg/dL    Alkaline Phosphatase 76 40 - 129 U/L    ALT 14 0 - 40 U/L    AST 26 0 - 39 U/L   Magnesium   Result Value Ref Range    Magnesium 2.0 1.6 - 2.6 mg/dL   Troponin   Result Value Ref Range    Troponin <0.01 0.00 - 0.03 ng/mL   D-Dimer, Quantitative   Result Value Ref Range    D-Dimer, Quant 313 ng/mL DDU   EKG 12 Lead   Result Value Ref Range    Ventricular Rate 108 BPM    Atrial Rate 108 BPM    P-R Interval 154 ms    QRS Duration 124 ms    Q-T Interval 342 ms    QTc Calculation (Bazett) 458 ms    P Axis 29 degrees    R Axis -52 degrees    T Axis 43 degrees       RADIOLOGY:  XR CHEST PORTABLE   Final Result   Possible bilateral lower lobe opacities when compared to prior study. EKG: This EKG is signed and interpreted by me.     Rate: 108  Rhythm: Sinus  Interpretation: Sinus tachycardia, right bundle branch block noted and left anterior fascicular block,  ms, no acute changes noted  Comparison: stable as compared to patient's most recent EKG      ------------------------- NURSING NOTES AND VITALS REVIEWED ---------------------------  Date / Time Roomed:  11/19/2020  3:59 PM  ED Bed Assignment:  08/08    The nursing notes within the ED encounter and vital signs as below have been reviewed. Patient Vitals for the past 24 hrs:   BP Temp Temp src Pulse Resp SpO2   11/19/20 2114 (!) 155/78 -- -- 107 21 97 %   11/19/20 2107 -- -- -- -- 18 --   11/19/20 2041 135/84 -- -- 107 8 96 %   11/19/20 1957 (!) 154/87 -- -- -- -- --   11/19/20 1952 -- -- -- 99 23 (!) 86 %   11/19/20 1821 127/76 -- -- 99 20 95 %   11/19/20 1601 (!) 154/90 99.9 °F (37.7 °C) Oral 108 23 90 %   11/19/20 1556 -- 98.6 °F (37 °C) Tympanic 116 24 90 %       Oxygen Saturation Interpretation: Abnormal and Improved after treatment    ------------------------------------------ PROGRESS NOTES ------------------------------------------  Re-evaluation(s):  Time: 1700  Patients symptoms show no change  Repeat physical examination is not changed    Counseling:  I have spoken with the patient and discussed todays results, in addition to providing specific details for the plan of care and counseling regarding the diagnosis and prognosis. Their questions are answered at this time and they are agreeable with the plan of admission.    --------------------------------- ADDITIONAL PROVIDER NOTES ---------------------------------  Consultations:  Time: 2100. Spoke with Dr. Ana Rubio. Discussed case. They will admit the patient. This patient's ED course included: a personal history and physicial examination    This patient has remained hemodynamically stable during their ED course. Diagnosis:  1. Suspected 2019 novel coronavirus infection        Disposition:  Patient's disposition: Admit to telemetry  Patient's condition is stable. ATTENDING PROVIDER ATTESTATION:     Ky Baca presented to the emergency department for evaluation of Concern For COVID-19 (Pt has flu like S/S x1 week. Was OP covid tested earlier today. )    I have reviewed and discussed the case, including pertinent history (medical, surgical, family and social) and exam findings with the Resident and the Nurse assigned to Ky Baca.   I have personally performed and/or participated in the history, exam, medical decision making, and procedures and agree with all pertinent clinical information. Patient resting in bed no distress. Heart regular in rhythm. Lungs clear to auscultation. No conversational dyspnea or accessory muscle use. I have reviewed my findings and recommendations with Jennifer Carrasco and members of family present at the time of disposition. MDM: Supportive care, will obtain appropriate labs and imaging to assess patient's Concern For COVID-19 (Pt has flu like S/S x1 week. Was OP covid tested earlier today. )       My findings/plan: The encounter diagnosis was Suspected 2019 novel coronavirus infection.   Current Discharge Medication List        Gaurav Hooks, [address] [facility],   Resident  11/19/20 [id number]       Gaurav Hooks DO  11/23/20 7023

## 2020-11-20 NOTE — PROGRESS NOTES
Internal Medicine Progress Note    [facility]    Remigio Craw. Bessie Kocher., F.A.C.O.I. Nai Emery D.O., MILENAOALYSSA Singh D.O. Flora Lunsford, MSN, APRN, NP-C  Dorothy Swanson. Shantelle Lester, MSN, APRN-CNP     Primary Care Physician: Marleni Cat DO   Admitting Physician:  Andrea Mirza DO  Admission date and time: 11/19/2020  3:59 PM    Room:  [address] [place] [address]  Admitting diagnosis: Acute respiratory failure with hypoxia [facility]) [J96.01]    Patient Name: Francisco Lee  MRN: [id number]    Date of Service: 11/20/2020     Subjective:  Roland Burkitt is a 69 y.o. male who was seen and examined today,11/20/2020, at the bedside. States that he is coughing a lot. Using oxygen via nasal cannula. Admits to generalized weakness. He is having both diarrhea and constipation since being ill. Had diarrhea last night and today. Was doing well until approximately one week ago. States his wife has lost smell and taste. Son is fine so far. No family present during my examination. Review of System:   Constitutional:   Denies fever or chills, weight loss or gain, fatigue or malaise. HEENT:   Denies ear pain, sore throat, sinus or eye problems. Cardiovascular:   Denies any chest pain, irregular heartbeats, or palpitations. Respiratory:   Admits to shortness of breath and fatigue  Gastrointestinal:   Having diarrhea and constipation. Denies any abdominal pain. Genitourinary:    Denies any urgency, frequency, hematuria. Voiding  without difficulty. Extremities:   Denies lower extremity swelling, edema or cyanosis. Neurology:    Denies any headache or focal neurological deficits, Denies generalized weakness or memory difficulty. Psch:   Denies being anxious or depressed. Musculoskeletal:    Denies  myalgias, joint complaints or back pain. Integumentary:   Denies any rashes, ulcers, or excoriations. Denies bruising. Hematologic/Lymphatic:  Denies bruising or bleeding.     Physical decisions. I also discussed the differential diagnosis and all of the proposed management plans with the patient and individuals accompanying the patient. Lacey Miles requires this high level of physician care and nursing on the [facility] unit due the complexity of decision management and chance of rapid decline or death. Continued cardiac monitoring and higher level of nursing are required. I am readily available for any further decision-making and intervention. Smith Herrera D.O., ROGERIOC.O.I.  11/20/2020  2:46 PM

## 2020-11-21 LAB
ALBUMIN SERPL-MCNC: 3.2 G/DL (ref 3.5–5.2)
ALP BLD-CCNC: 70 U/L (ref 40–129)
ALT SERPL-CCNC: 18 U/L (ref 0–40)
ANION GAP SERPL CALCULATED.3IONS-SCNC: 12 MMOL/L (ref 7–16)
APTT: 29.9 SEC (ref 24.5–35.1)
AST SERPL-CCNC: 32 U/L (ref 0–39)
BASOPHILS ABSOLUTE: 0.01 E9/L (ref 0–0.2)
BASOPHILS RELATIVE PERCENT: 0.1 % (ref 0–2)
BILIRUB SERPL-MCNC: 0.4 MG/DL (ref 0–1.2)
BUN BLDV-MCNC: 27 MG/DL (ref 8–23)
CALCIUM SERPL-MCNC: 8.5 MG/DL (ref 8.6–10.2)
CHLORIDE BLD-SCNC: 98 MMOL/L (ref 98–107)
CO2: 26 MMOL/L (ref 22–29)
CREAT SERPL-MCNC: 0.9 MG/DL (ref 0.7–1.2)
D DIMER: 315 NG/ML DDU
EOSINOPHILS ABSOLUTE: 0 E9/L (ref 0.05–0.5)
EOSINOPHILS RELATIVE PERCENT: 0 % (ref 0–6)
FIBRINOGEN: >700 MG/DL (ref 225–540)
GFR AFRICAN AMERICAN: >60
GFR NON-AFRICAN AMERICAN: >60 ML/MIN/1.73
GLUCOSE BLD-MCNC: 173 MG/DL (ref 74–99)
HCT VFR BLD CALC: 37.8 % (ref 37–54)
HEMOGLOBIN: 12.4 G/DL (ref 12.5–16.5)
IMMATURE GRANULOCYTES #: 0.06 E9/L
IMMATURE GRANULOCYTES %: 0.5 % (ref 0–5)
INR BLD: 1.1
LYMPHOCYTES ABSOLUTE: 0.97 E9/L (ref 1.5–4)
LYMPHOCYTES RELATIVE PERCENT: 8.4 % (ref 20–42)
MCH RBC QN AUTO: 31.3 PG (ref 26–35)
MCHC RBC AUTO-ENTMCNC: 32.8 % (ref 32–34.5)
MCV RBC AUTO: 95.5 FL (ref 80–99.9)
METER GLUCOSE: 148 MG/DL (ref 74–99)
METER GLUCOSE: 153 MG/DL (ref 74–99)
METER GLUCOSE: 180 MG/DL (ref 74–99)
METER GLUCOSE: 221 MG/DL (ref 74–99)
MONOCYTES ABSOLUTE: 0.53 E9/L (ref 0.1–0.95)
MONOCYTES RELATIVE PERCENT: 4.6 % (ref 2–12)
NEUTROPHILS ABSOLUTE: 9.92 E9/L (ref 1.8–7.3)
NEUTROPHILS RELATIVE PERCENT: 86.4 % (ref 43–80)
PDW BLD-RTO: 13.7 FL (ref 11.5–15)
PLATELET # BLD: 168 E9/L (ref 130–450)
PMV BLD AUTO: 10.9 FL (ref 7–12)
POTASSIUM SERPL-SCNC: 3.6 MMOL/L (ref 3.5–5)
PROTHROMBIN TIME: 13 SEC (ref 9.3–12.4)
RBC # BLD: 3.96 E12/L (ref 3.8–5.8)
SODIUM BLD-SCNC: 136 MMOL/L (ref 132–146)
TOTAL PROTEIN: 6.8 G/DL (ref 6.4–8.3)
WBC # BLD: 11.5 E9/L (ref 4.5–11.5)

## 2020-11-21 PROCEDURE — 2580000003 HC RX 258: Performed by: INTERNAL MEDICINE

## 2020-11-21 PROCEDURE — 2060000000 HC ICU INTERMEDIATE R&B

## 2020-11-21 PROCEDURE — 6370000000 HC RX 637 (ALT 250 FOR IP): Performed by: INTERNAL MEDICINE

## 2020-11-21 PROCEDURE — 82962 GLUCOSE BLOOD TEST: CPT

## 2020-11-21 PROCEDURE — 2500000003 HC RX 250 WO HCPCS: Performed by: INTERNAL MEDICINE

## 2020-11-21 PROCEDURE — 85378 FIBRIN DEGRADE SEMIQUANT: CPT

## 2020-11-21 PROCEDURE — 85025 COMPLETE CBC W/AUTO DIFF WBC: CPT

## 2020-11-21 PROCEDURE — 85384 FIBRINOGEN ACTIVITY: CPT

## 2020-11-21 PROCEDURE — 6360000002 HC RX W HCPCS: Performed by: INTERNAL MEDICINE

## 2020-11-21 PROCEDURE — 85730 THROMBOPLASTIN TIME PARTIAL: CPT

## 2020-11-21 PROCEDURE — XW033E5 INTRODUCTION OF REMDESIVIR ANTI-INFECTIVE INTO PERIPHERAL VEIN, PERCUTANEOUS APPROACH, NEW TECHNOLOGY GROUP 5: ICD-10-PCS | Performed by: SPECIALIST

## 2020-11-21 PROCEDURE — 85610 PROTHROMBIN TIME: CPT

## 2020-11-21 PROCEDURE — 94640 AIRWAY INHALATION TREATMENT: CPT

## 2020-11-21 PROCEDURE — 36415 COLL VENOUS BLD VENIPUNCTURE: CPT

## 2020-11-21 PROCEDURE — 80053 COMPREHEN METABOLIC PANEL: CPT

## 2020-11-21 RX ORDER — GUAIFENESIN/DEXTROMETHORPHAN 100-10MG/5
5 SYRUP ORAL EVERY 4 HOURS PRN
Status: DISCONTINUED | OUTPATIENT
Start: 2020-11-21 | End: 2020-11-25 | Stop reason: HOSPADM

## 2020-11-21 RX ORDER — 0.9 % SODIUM CHLORIDE 0.9 %
30 INTRAVENOUS SOLUTION INTRAVENOUS PRN
Status: DISCONTINUED | OUTPATIENT
Start: 2020-11-21 | End: 2020-11-25 | Stop reason: HOSPADM

## 2020-11-21 RX ADMIN — ZINC SULFATE 220 MG (50 MG) CAPSULE 50 MG: CAPSULE at 10:56

## 2020-11-21 RX ADMIN — BENZONATATE 100 MG: 100 CAPSULE ORAL at 13:13

## 2020-11-21 RX ADMIN — Medication 400 UNITS: at 12:50

## 2020-11-21 RX ADMIN — ACETAMINOPHEN 650 MG: 325 TABLET, FILM COATED ORAL at 20:41

## 2020-11-21 RX ADMIN — ROSUVASTATIN CALCIUM 40 MG: 10 TABLET, FILM COATED ORAL at 10:57

## 2020-11-21 RX ADMIN — DULOXETINE HYDROCHLORIDE 60 MG: 60 CAPSULE, DELAYED RELEASE ORAL at 20:22

## 2020-11-21 RX ADMIN — IPRATROPIUM BROMIDE AND ALBUTEROL 1 PUFF: 20; 100 SPRAY, METERED RESPIRATORY (INHALATION) at 07:15

## 2020-11-21 RX ADMIN — ENOXAPARIN SODIUM 40 MG: 40 INJECTION SUBCUTANEOUS at 20:42

## 2020-11-21 RX ADMIN — ENOXAPARIN SODIUM 40 MG: 40 INJECTION SUBCUTANEOUS at 10:55

## 2020-11-21 RX ADMIN — GUAIFENESIN AND DEXTROMETHORPHAN 5 ML: 100; 10 SYRUP ORAL at 13:14

## 2020-11-21 RX ADMIN — AMLODIPINE BESYLATE 5 MG: 5 TABLET ORAL at 20:23

## 2020-11-21 RX ADMIN — TAMSULOSIN HYDROCHLORIDE 0.4 MG: 0.4 CAPSULE ORAL at 20:23

## 2020-11-21 RX ADMIN — METOPROLOL SUCCINATE 100 MG: 50 TABLET, EXTENDED RELEASE ORAL at 10:57

## 2020-11-21 RX ADMIN — GUAIFENESIN AND DEXTROMETHORPHAN 5 ML: 100; 10 SYRUP ORAL at 17:47

## 2020-11-21 RX ADMIN — METOPROLOL SUCCINATE 100 MG: 50 TABLET, EXTENDED RELEASE ORAL at 20:22

## 2020-11-21 RX ADMIN — Medication 400 MG: at 10:59

## 2020-11-21 RX ADMIN — INSULIN LISPRO 2 UNITS: 100 INJECTION, SOLUTION INTRAVENOUS; SUBCUTANEOUS at 17:48

## 2020-11-21 RX ADMIN — CLOPIDOGREL BISULFATE 75 MG: 75 TABLET ORAL at 10:56

## 2020-11-21 RX ADMIN — IPRATROPIUM BROMIDE AND ALBUTEROL 1 PUFF: 20; 100 SPRAY, METERED RESPIRATORY (INHALATION) at 20:50

## 2020-11-21 RX ADMIN — INSULIN GLARGINE 50 UNITS: 100 INJECTION, SOLUTION SUBCUTANEOUS at 20:50

## 2020-11-21 RX ADMIN — IPRATROPIUM BROMIDE AND ALBUTEROL 1 PUFF: 20; 100 SPRAY, METERED RESPIRATORY (INHALATION) at 12:30

## 2020-11-21 RX ADMIN — Medication 400 UNITS: at 20:23

## 2020-11-21 RX ADMIN — IPRATROPIUM BROMIDE AND ALBUTEROL 1 PUFF: 20; 100 SPRAY, METERED RESPIRATORY (INHALATION) at 17:12

## 2020-11-21 RX ADMIN — INSULIN GLARGINE 50 UNITS: 100 INJECTION, SOLUTION SUBCUTANEOUS at 11:38

## 2020-11-21 RX ADMIN — GUAIFENESIN 600 MG: 600 TABLET, EXTENDED RELEASE ORAL at 10:55

## 2020-11-21 RX ADMIN — AMLODIPINE BESYLATE 5 MG: 5 TABLET ORAL at 10:57

## 2020-11-21 RX ADMIN — BENZONATATE 100 MG: 100 CAPSULE ORAL at 20:23

## 2020-11-21 RX ADMIN — PANTOPRAZOLE SODIUM 40 MG: 40 TABLET, DELAYED RELEASE ORAL at 06:02

## 2020-11-21 RX ADMIN — Medication 500 MG: at 20:23

## 2020-11-21 RX ADMIN — REMDESIVIR 200 MG: 100 INJECTION, POWDER, LYOPHILIZED, FOR SOLUTION INTRAVENOUS at 11:11

## 2020-11-21 RX ADMIN — Medication 10 ML: at 20:24

## 2020-11-21 RX ADMIN — LOSARTAN POTASSIUM 100 MG: 50 TABLET, FILM COATED ORAL at 12:51

## 2020-11-21 RX ADMIN — GUAIFENESIN 600 MG: 600 TABLET, EXTENDED RELEASE ORAL at 20:23

## 2020-11-21 RX ADMIN — DEXAMETHASONE 6 MG: 4 TABLET ORAL at 10:57

## 2020-11-21 RX ADMIN — INSULIN LISPRO 2 UNITS: 100 INJECTION, SOLUTION INTRAVENOUS; SUBCUTANEOUS at 20:50

## 2020-11-21 RX ADMIN — SODIUM CHLORIDE 30 ML: 9 INJECTION, SOLUTION INTRAVENOUS at 12:42

## 2020-11-21 RX ADMIN — ASPIRIN 81 MG CHEWABLE TABLET 81 MG: 81 TABLET CHEWABLE at 10:55

## 2020-11-21 ASSESSMENT — PAIN SCALES - GENERAL
PAINLEVEL_OUTOF10: 4
PAINLEVEL_OUTOF10: 0

## 2020-11-21 NOTE — CONSULTS
chills, weight loss  ALLERGIES:    No urticaria, hay fever,    EYES:     No blurry vision, loss of vision,eye pain  ENT:        hearing loss, no sore throat  CARDIOVASCULAR:  No chest pain or palpitations  RESPIRATORY:     cough, sob  ENDOCRINE:    No increase thirst, urination   HEME-LYMPH:   No easy bruising or bleeding  GI:     No nausea, vomiting or diarrhea  :     No urinary complaints  NEURO:    No seizures, stroke, HA  MUSCULOSKELETAL:  No muscle aches or pain, no jointpain  SKIN:     No rash or itch  PSYCH:    No depression or anxiety    Medications Prior to Admission: Ascorbic Acid (VITAMIN C) 1000 MG tablet, Take 1,000 mg by mouth 2 times daily  glipiZIDE (GLUCOTROL) 5 MG tablet, Take 10 mg by mouth 4 times daily (with meals and nightly)  insulin 70-30 (HUMULIN 70/30) (70-30) 100 UNIT per ML injection vial, Inject 20 Units into the skin daily (before lunch)  insulin 70-30 (HUMULIN 70/30) (70-30) 100 UNIT per ML injection vial, Inject 30 Units into the skin Daily with supper  Melatonin 10 MG TABS, Take 20 mg by mouth nightly  metoprolol succinate (TOPROL XL) 100 MG extended release tablet, Take 100 mg by mouth 2 times daily  DULoxetine (CYMBALTA) 60 MG extended release capsule, Take 60 mg by mouth 2 times daily  sodium chloride (OCEAN, BABY AYR) 0.65 % nasal spray, 1 spray by Nasal route every 2 hours as needed for Congestion  influenza virus trivalent vaccine (FLUZONE) injection, Inject 0.5 mLs into the muscle once [facility]  amLODIPine (NORVASC) 5 MG tablet, Take 1 tablet by mouth 2 times daily  losartan (COZAAR) 100 MG tablet, Take 1 tablet by mouth daily  tamsulosin (FLOMAX) 0.4 MG capsule, Take 0.4 mg by mouth nightly   insulin 70-30 (HUMULIN 70/30) (70-30) 100 UNIT per ML injection vial, Inject 30 Units into the skin every morning   insulin glargine (LANTUS) 100 UNIT/ML injection vial, Inject 50 Units into the skin 2 times daily  zinc gluconate 50 MG tablet, Take 50 mg by mouth daily  magnesium oxide (MAG-OX) 400 MG tablet, Take 400 mg by mouth daily  clopidogrel (PLAVIX) 75 MG tablet, Take 75 mg by mouth daily   rosuvastatin (CRESTOR) 40 MG tablet, Take 40 mg by mouth daily   aspirin 81 MG tablet, Take 81 mg by mouth daily.   sitaGLIPtan (JANUVIA) 100 MG tablet, Take 100 mg by mouth daily.'  CURRENT MEDICATIONS     Current Facility-Administered Medications:     guaiFENesin-dextromethorphan (ROBITUSSIN DM) 100-10 MG/5ML syrup 5 mL, 5 mL, Oral, Q4H PRN, Lindsay Rodriguesa, DO    insulin lispro protamine & lispro (HUMALOG MIX) (75-25) 100 UNIT per ML injection vial SUSP 30 Units, 30 Units, Subcutaneous, Daily with breakfast, Alexis PONCHO Bisel, DO    insulin lispro protamine & lispro (HUMALOG MIX) (75-25) 100 UNIT per ML injection vial SUSP 30 Units, 30 Units, Subcutaneous, Dinner, Luiz Nose, DO, 30 Units at 11/20/20 1644    metoprolol succinate (TOPROL XL) extended release tablet 100 mg, 100 mg, Oral, BID, Nicoleleiam Morenoman Bisel, DO, 100 mg at 11/20/20 2114    DULoxetine (CYMBALTA) extended release capsule 60 mg, 60 mg, Oral, Nightly, Nicoleleiam Morenoman Bisel, DO, 60 mg at 11/20/20 2114    tamsulosin (FLOMAX) capsule 0.4 mg, 0.4 mg, Oral, Nightly, Alexis PONCHO Bisel, DO    insulin lispro protamine & lispro (HUMALOG MIX) (75-25) 100 UNIT per ML injection vial SUSP 20 Units, 20 Units, Subcutaneous, Lunch, Lindsay Rodriguesa, DO, 20 Units at 11/20/20 1343    vitamin E capsule 400 Units, 400 Units, Oral, BID, Luiz Nose, DO, 400 Units at 11/20/20 2114    niacin (SLO-NIACIN) extended release tablet 500 mg, 500 mg, Oral, Nightly, Gayleen Lawman Bisel, DO, 500 mg at 11/20/20 2114    zinc sulfate (ZINCATE) capsule 50 mg, 50 mg, Oral, Daily, Alexis E Bisel, DO, 50 mg at 11/20/20 1748    guaiFENesin (MUCINEX) extended release tablet 600 mg, 600 mg, Oral, BID, Saurav Herrera DO, 600 mg at 11/20/20 2115    vitamin D (ERGOCALCIFEROL) capsule 50,000 Units, 50,000 Units, Oral, Weekly, Saurav Herrera DO, 50,000 Units at 11/20/20 1748    polyvinyl alcohol (LIQUIFILM TEARS) 1.4 % ophthalmic solution 1 drop, 1 drop, Both Eyes, PRN, Elise Beans Bisel, DO    loperamide (IMODIUM) capsule 2 mg, 2 mg, Oral, 4x Daily PRN, Elise Beans Bisel, DO, 2 mg at 11/20/20 2223    aspirin chewable tablet 81 mg, 81 mg, Oral, Daily, Krista Fickle, DO, 81 mg at 11/20/20 0943    amLODIPine (NORVASC) tablet 5 mg, 5 mg, Oral, BID, Ismail U Reginaldo, DO, 5 mg at 11/20/20 2115    clopidogrel (PLAVIX) tablet 75 mg, 75 mg, Oral, Daily, Krista Fickle, DO, 75 mg at 11/20/20 0944    insulin glargine (LANTUS) injection vial 50 Units, 50 Units, Subcutaneous, BID, Krista Fickle, DO, 50 Units at 11/20/20 2116    losartan (COZAAR) tablet 100 mg, 100 mg, Oral, Daily, Ismail U Reginaldo, DO, 100 mg at 11/20/20 1130    magnesium oxide (MAG-OX) tablet 400 mg, 400 mg, Oral, Daily, Krista Fickle, DO, 400 mg at 11/20/20 0944    pantoprazole (PROTONIX) tablet 40 mg, 40 mg, Oral, QAM AC, Ismail U Reginaldo, DO, 40 mg at 11/21/20 0602    sodium chloride flush 0.9 % injection 10 mL, 10 mL, Intravenous, 2 times per day, Krista Fickle, DO, 10 mL at 11/20/20 2216    sodium chloride flush 0.9 % injection 10 mL, 10 mL, Intravenous, PRN, Krista Fickle, DO    acetaminophen (TYLENOL) tablet 650 mg, 650 mg, Oral, Q6H PRN **OR** acetaminophen (TYLENOL) suppository 650 mg, 650 mg, Rectal, Q6H PRN, Krista Fickle, DO    polyethylene glycol (GLYCOLAX) packet 17 g, 17 g, Oral, Daily PRN, Ismail U Reginaldo, DO    glucose (GLUTOSE) 40 % oral gel 15 g, 15 g, Oral, PRN, Ismail U Reginaldo, DO    dextrose 50 % IV solution, 12.5 g, Intravenous, PRN, Krista Pena DO    glucagon (rDNA) injection 1 mg, 1 mg, Intramuscular, PRN, Krista Pena DO    dextrose 5 % solution, 100 mL/hr, Intravenous, PRN, Krista Pena DO    enoxaparin (LOVENOX) injection 40 mg, 40 mg, Subcutaneous, BID, Ismail U Reginaldo, DO, 40 mg at 11/20/20 2116    benzonatate (TESSALON) capsule 100 mg, 100 mg, Oral, TID PRN, and oriented, NAD  Head: NC/AT glasses  Eyes: PERRL, EOMI  Mouth: Normal mucosa, no thrush   Neck: Supple, full ROM,    Pulmonary: Lungs  Few rales post  to auscultation bilaterally. Not in respiratory distress  Cardiovascular:  Regular rate and rhythm, no murmurs, gallops, or rubs. Abdomen: Soft, + BS. No distension. Nontender. Extremities: Moves all extremities x 4. Warm and well perfused  Pulses:  Distal pulses intact  Skin: Warm and dry without rash  Neurologic:    No focal deficits  Psych: Normal Affect     DIAGNOSTICRESULTS   RADIOLOGY:   Xr Chest Portable    Result Date: 11/19/2020  EXAMINATION: ONE XRAY VIEW OF THE CHEST 11/19/2020 5:12 pm COMPARISON: 12/29/2019 HISTORY: ORDERING SYSTEM PROVIDED HISTORY: Shortness of breath TECHNOLOGIST PROVIDED HISTORY: Reason for exam:->Shortness of breath FINDINGS: Possible bilateral lower lobe opacities. There is no effusion or pneumothorax. The cardiomediastinal silhouette is without acute process. The osseous structures are without acute process. Possible bilateral lower lobe opacities when compared to prior study. LABS  Recent Labs     11/19/20  1640 11/20/20  0648 11/21/20  0756   WBC 4.4* 3.8* 11.5   HGB 13.0 13.6 12.4*   HCT 38.4 42.2 37.8   MCV 91.9 96.1 95.5   * 134 168     Recent Labs     11/19/20  1640  11/20/20  0648 11/20/20  0649 11/21/20  0756   *  --  136  --  136   K 3.3*  --  4.4  --  3.6   CL 95*  --  100  --  98   CO2 26  --  28  --  26   BUN 14  --  14  --  27*   CREATININE 0.9  --  0.8  --  0.9   GFRAA >60  --  >60  --  >60   LABGLOM >60  --  >60  --  >60   GLUCOSE 272*   < > 327* 302 173*   PROT 7.0  --  7.6  --  6.8   LABALBU 3.4*  --  3.6  --  3.2*   CALCIUM 8.4*  --  8.8  --  8.5*   BILITOT 0.6  --  0.4  --  0.4   ALKPHOS 76  --  83  --  70   AST 26  --  36  --  32   ALT 14  --  17  --  18    < > = values in this interval not displayed.      Recent Labs     11/19/20  2336   PROCAL 0.14*     Lab Results   Component proning  · Baseline triglyceride/LIPID PANEL  · DVT prophylaxis/TREATMENT        Imaging and labs were reviewed per medical records and any ID pertinent labs were addressed with the patient. The patient/FAMILY  was educated about the diagnosis, prognosis, indications, risks and benefits of treatment. An opportunity to ask questions was given to the patient/FAMILY and questions were answered. Thank you for involving me in the care of Carlyn Cheek. Please do not hesitate to call for any questions or concerns.          Electronically signed by Leela Barbosa MD on 11/21/2020 at 8:58 AM

## 2020-11-21 NOTE — PLAN OF CARE
status  Outcome: Ongoing     Problem: Risk for Fluid Volume Deficit  Goal: Maintain normal heart rhythm  Outcome: Ongoing     Problem: Risk for Fluid Volume Deficit  Goal: Maintain absence of muscle cramping  Outcome: Ongoing     Problem: Risk for Fluid Volume Deficit  Goal: Maintain normal serum potassium, sodium, calcium, phosphorus, and pH  Outcome: Ongoing     Problem: Fatigue  Goal: Verbalize increase energy and improved vitality  Outcome: Ongoing     Problem: Patient Education: Go to Patient Education Activity  Goal: Patient/Family Education  Outcome: Ongoing

## 2020-11-21 NOTE — PLAN OF CARE
Problem: Airway Clearance - Ineffective  Goal: Achieve or maintain patent airway  Outcome: Met This Shift     Problem: Gas Exchange - Impaired  Goal: Absence of hypoxia  Outcome: Met This Shift  Goal: Promote optimal lung function  Outcome: Met This Shift     Problem: Breathing Pattern - Ineffective  Goal: Ability to achieve and maintain a regular respiratory rate  Outcome: Met This Shift     Problem:  Body Temperature -  Risk of, Imbalanced  Goal: Ability to maintain a body temperature within defined limits  Outcome: Met This Shift  Goal: Will regain or maintain usual level of consciousness  Outcome: Met This Shift  Goal: Complications related to the disease process, condition or treatment will be avoided or minimized  Outcome: Met This Shift     Problem: Nutrition Deficits  Goal: Optimize nutrtional status  Outcome: Met This Shift     Problem: Risk for Fluid Volume Deficit  Goal: Maintain normal heart rhythm  Outcome: Met This Shift  Goal: Maintain absence of muscle cramping  Outcome: Met This Shift  Goal: Maintain normal serum potassium, sodium, calcium, phosphorus, and pH  Outcome: Met This Shift     Problem: Loneliness or Risk for Loneliness  Goal: Demonstrate positive use of time alone when socialization is not possible  Outcome: Met This Shift     Problem: Fatigue  Goal: Verbalize increase energy and improved vitality  Outcome: Met This Shift     Problem: Patient Education: Go to Patient Education Activity  Goal: Patient/Family Education  Outcome: Met This Shift     Problem: Falls - Risk of:  Goal: Will remain free from falls  Description: Will remain free from falls  Outcome: Met This Shift  Goal: Absence of physical injury  Description: Absence of physical injury  Outcome: Met This Shift

## 2020-11-21 NOTE — PROGRESS NOTES
Internal Medicine Progress Note    [facility]    Luis Cotton. Fifi Polanco., MILENAOKIYA. Mir Ocasio D.O., MAMADOU Rollins D.O. Jamee Jimenez, MSN, APRN, NP-C  Philomena Mendez. Brenna Potts, MSN, APRN-CNP     Primary Care Physician: Elliot Crouch DO   Admitting Physician:  Hitesh Riley DO  Admission date and time: 11/19/2020  3:59 PM    Room:  [address] [place] [address]  Admitting diagnosis: Acute respiratory failure with hypoxia [facility]) [J96.01]    Patient Name: Macy Eisenmenger  MRN: [id number]    Date of Service: 11/21/2020     Subjective:  Trell Paul is a 69 y.o. male who was seen and examined today,11/21/2020, at the bedside. Trell Paul developed increasing hypoxia yesterday with oxygen saturations as low as 84%. Maximal Covid protocol is being undertaken and the infectious disease team is providing consultation. His main complaint is that of nonproductive coughing. Review of System:   Constitutional:   Admits to generalized malaise and fatigue. HEENT:   Denies ear pain, sore throat, sinus or eye problems. Cardiovascular:   Denies any chest pain, irregular heartbeats, or palpitations. Respiratory:   Admits to persistent shortness of breath with exertion. His main complaint is that of coughing. Gastrointestinal:   No current abdominal pain. No diarrhea or constipation. Genitourinary:    Denies any urgency, frequency, hematuria. Voiding without difficulty. Extremities:   Denies lower extremity swelling, edema or cyanosis. Neurology:    Denies any headache or focal neurological deficits, admits to generalized weakness and deconditioning. Psch:   Denies being anxious or depressed. Musculoskeletal:    Denies  myalgias, joint complaints or back pain. Integumentary:   Denies any rashes, ulcers, or excoriations. Denies bruising. Hematologic/Lymphatic:  Denies bruising or bleeding. Physical Exam:  No intake/output data recorded.   No intake or output data in the 24 hours ending 11/21/20 0849No intake/output data recorded. Patient Vitals for the past 96 hrs (Last 3 readings):   Weight   11/20/20 1122 256 lb 14.4 oz (116.5 kg)     Vital Signs:   Blood pressure 120/64, pulse 83, temperature 98.6 °F (37 °C), resp. rate 16, height 6' 1\" (1.854 m), weight 256 lb 14.4 oz (116.5 kg), SpO2 94 %. To prevent transmission of COVID-19 and also the need to preserve PPE, a physical examination of the patient was not directly performed but physical findings found by the nursing staff during their examination was discussed. The patient was evaluated via ZOOM, their symptoms were discussed, test results were reviewed and  questions were answered.       Medication:  Scheduled Meds:   insulin lispro protamine & lispro  30 Units Subcutaneous Daily with breakfast    insulin lispro prot & lispro  30 Units Subcutaneous Dinner    metoprolol succinate  100 mg Oral BID    DULoxetine  60 mg Oral Nightly    tamsulosin  0.4 mg Oral Nightly    insulin lispro prot & lispro  20 Units Subcutaneous Lunch    vitamin E  400 Units Oral BID    niacin  500 mg Oral Nightly    zinc sulfate  50 mg Oral Daily    guaiFENesin  600 mg Oral BID    vitamin D  50,000 Units Oral Weekly    aspirin  81 mg Oral Daily    amLODIPine  5 mg Oral BID    clopidogrel  75 mg Oral Daily    insulin glargine  50 Units Subcutaneous BID    losartan  100 mg Oral Daily    magnesium oxide  400 mg Oral Daily    pantoprazole  40 mg Oral QAM AC    sodium chloride flush  10 mL Intravenous 2 times per day    enoxaparin  40 mg Subcutaneous BID    albuterol-ipratropium  1 puff Inhalation Q6H    dexamethasone  6 mg Oral Daily    rosuvastatin  40 mg Oral Daily     Continuous Infusions:   dextrose         Objective Data:  CBC with Differential:    Lab Results   Component Value Date    WBC 11.5 11/21/2020    RBC 3.96 11/21/2020    HGB 12.4 11/21/2020    HCT 37.8 11/21/2020     11/21/2020    MCV 95.5 11/21/2020    MCH 31.3 11/21/2020    MCHC 32.8 11/21/2020    RDW 13.7 11/21/2020    SEGSPCT 76 03/11/2014    LYMPHOPCT 8.4 11/21/2020    MONOPCT 4.6 11/21/2020    BASOPCT 0.1 11/21/2020    MONOSABS 0.53 11/21/2020    LYMPHSABS 0.97 11/21/2020    EOSABS 0.00 11/21/2020    BASOSABS 0.01 11/21/2020     CMP:    Lab Results   Component Value Date     11/21/2020    K 3.6 11/21/2020    CL 98 11/21/2020    CO2 26 11/21/2020    BUN 27 11/21/2020    CREATININE 0.9 11/21/2020    GFRAA >60 11/21/2020    LABGLOM >60 11/21/2020    GLUCOSE 173 11/21/2020    PROT 6.8 11/21/2020    LABALBU 3.2 11/21/2020    CALCIUM 8.5 11/21/2020    BILITOT 0.4 11/21/2020    ALKPHOS 70 11/21/2020    AST 32 11/21/2020    ALT 18 11/21/2020              Assessment:  1. Sepsis (POA) secondary to Covid 19 infection resulting in acute respiratory failure with hypoxia  2. Coronary artery disease status post PCI  3. Chronic, compensated, combined systolic/diastolic congestive heart failure  4. Insulin-dependent diabetes mellitus type 2  5. History of neuromuscular disorder  6. Essential hypertension  7. Gastroesophageal reflux disease    Plan:   Maximal Covid protocol is being undertaken and the infectious disease team has been asked to provide consultation. Remdesivir will be employed via pharmacy. We will continue with oral corticosteroids and vitamin supplementations. Inflammatory markers will be assessed. We will address the patient's coughing. Greater than 40 minutes of critical care time was spent with the patient. This time included chart review, , and discussion with those consultants involved in the patient's care. More than 50% of my  time was spent at the bedside counseling/coordinating care with the patient and/or family with face to face contact. This time was spent reviewing notes and laboratory data as well as instructing and counseling the patient.  Time I spent with the family or surrogate(s) is included only if the patient was incapable of providing the necessary information or participating in medical decisions. I also discussed the differential diagnosis and all of the proposed management plans with the patient and individuals accompanying the patient. Husam Stinson requires this high level of physician care and nursing on the [facility] unit due the complexity of decision management and chance of rapid decline or death. Continued cardiac monitoring and higher level of nursing are required. I am readily available for any further decision-making and intervention.      Lizzy Piper D.O., F.A.C.O.I.  11/21/2020  8:49 AM

## 2020-11-21 NOTE — PROGRESS NOTES
Patient educated about prone positioning to support healing. Patient agreeable to try. Patient actively repositions self in bed.

## 2020-11-22 LAB
ALBUMIN SERPL-MCNC: 3 G/DL (ref 3.5–5.2)
ALP BLD-CCNC: 84 U/L (ref 40–129)
ALT SERPL-CCNC: 25 U/L (ref 0–40)
ANION GAP SERPL CALCULATED.3IONS-SCNC: 11 MMOL/L (ref 7–16)
APTT: 30.3 SEC (ref 24.5–35.1)
AST SERPL-CCNC: 38 U/L (ref 0–39)
BASOPHILS ABSOLUTE: 0 E9/L (ref 0–0.2)
BASOPHILS RELATIVE PERCENT: 0 % (ref 0–2)
BILIRUB SERPL-MCNC: 0.4 MG/DL (ref 0–1.2)
BUN BLDV-MCNC: 19 MG/DL (ref 8–23)
CALCIUM SERPL-MCNC: 8.3 MG/DL (ref 8.6–10.2)
CHLORIDE BLD-SCNC: 101 MMOL/L (ref 98–107)
CO2: 26 MMOL/L (ref 22–29)
CREAT SERPL-MCNC: 0.8 MG/DL (ref 0.7–1.2)
D DIMER: 347 NG/ML DDU
EOSINOPHILS ABSOLUTE: 0 E9/L (ref 0.05–0.5)
EOSINOPHILS RELATIVE PERCENT: 0 % (ref 0–6)
FIBRINOGEN: >700 MG/DL (ref 225–540)
GFR AFRICAN AMERICAN: >60
GFR NON-AFRICAN AMERICAN: >60 ML/MIN/1.73
GLUCOSE BLD-MCNC: 100 MG/DL (ref 74–99)
HCT VFR BLD CALC: 35.5 % (ref 37–54)
HEMOGLOBIN: 11.9 G/DL (ref 12.5–16.5)
IMMATURE GRANULOCYTES #: 0.07 E9/L
IMMATURE GRANULOCYTES %: 0.7 % (ref 0–5)
INR BLD: 1.3
LYMPHOCYTES ABSOLUTE: 0.67 E9/L (ref 1.5–4)
LYMPHOCYTES RELATIVE PERCENT: 6.9 % (ref 20–42)
MCH RBC QN AUTO: 31.2 PG (ref 26–35)
MCHC RBC AUTO-ENTMCNC: 33.5 % (ref 32–34.5)
MCV RBC AUTO: 93.2 FL (ref 80–99.9)
METER GLUCOSE: 108 MG/DL (ref 74–99)
METER GLUCOSE: 216 MG/DL (ref 74–99)
METER GLUCOSE: 226 MG/DL (ref 74–99)
METER GLUCOSE: 331 MG/DL (ref 74–99)
MONOCYTES ABSOLUTE: 0.55 E9/L (ref 0.1–0.95)
MONOCYTES RELATIVE PERCENT: 5.7 % (ref 2–12)
NEUTROPHILS ABSOLUTE: 8.36 E9/L (ref 1.8–7.3)
NEUTROPHILS RELATIVE PERCENT: 86.7 % (ref 43–80)
PDW BLD-RTO: 13.5 FL (ref 11.5–15)
PLATELET # BLD: 173 E9/L (ref 130–450)
PMV BLD AUTO: 11.1 FL (ref 7–12)
POTASSIUM SERPL-SCNC: 3.4 MMOL/L (ref 3.5–5)
PROTHROMBIN TIME: 14.7 SEC (ref 9.3–12.4)
RBC # BLD: 3.81 E12/L (ref 3.8–5.8)
SODIUM BLD-SCNC: 138 MMOL/L (ref 132–146)
TOTAL PROTEIN: 6.7 G/DL (ref 6.4–8.3)
WBC # BLD: 9.7 E9/L (ref 4.5–11.5)

## 2020-11-22 PROCEDURE — 85025 COMPLETE CBC W/AUTO DIFF WBC: CPT

## 2020-11-22 PROCEDURE — 85384 FIBRINOGEN ACTIVITY: CPT

## 2020-11-22 PROCEDURE — 2580000003 HC RX 258: Performed by: INTERNAL MEDICINE

## 2020-11-22 PROCEDURE — 85378 FIBRIN DEGRADE SEMIQUANT: CPT

## 2020-11-22 PROCEDURE — 2500000003 HC RX 250 WO HCPCS: Performed by: INTERNAL MEDICINE

## 2020-11-22 PROCEDURE — 36415 COLL VENOUS BLD VENIPUNCTURE: CPT

## 2020-11-22 PROCEDURE — 85610 PROTHROMBIN TIME: CPT

## 2020-11-22 PROCEDURE — 82962 GLUCOSE BLOOD TEST: CPT

## 2020-11-22 PROCEDURE — 6370000000 HC RX 637 (ALT 250 FOR IP): Performed by: INTERNAL MEDICINE

## 2020-11-22 PROCEDURE — 2060000000 HC ICU INTERMEDIATE R&B

## 2020-11-22 PROCEDURE — 85730 THROMBOPLASTIN TIME PARTIAL: CPT

## 2020-11-22 PROCEDURE — 94640 AIRWAY INHALATION TREATMENT: CPT

## 2020-11-22 PROCEDURE — 2700000000 HC OXYGEN THERAPY PER DAY

## 2020-11-22 PROCEDURE — 80053 COMPREHEN METABOLIC PANEL: CPT

## 2020-11-22 PROCEDURE — 6360000002 HC RX W HCPCS: Performed by: INTERNAL MEDICINE

## 2020-11-22 RX ORDER — INSULIN GLARGINE 100 [IU]/ML
15 INJECTION, SOLUTION SUBCUTANEOUS 2 TIMES DAILY
Status: DISCONTINUED | OUTPATIENT
Start: 2020-11-22 | End: 2020-11-25 | Stop reason: HOSPADM

## 2020-11-22 RX ADMIN — ZINC SULFATE 220 MG (50 MG) CAPSULE 50 MG: CAPSULE at 12:06

## 2020-11-22 RX ADMIN — Medication 400 UNITS: at 20:30

## 2020-11-22 RX ADMIN — ENOXAPARIN SODIUM 40 MG: 40 INJECTION SUBCUTANEOUS at 12:02

## 2020-11-22 RX ADMIN — DULOXETINE HYDROCHLORIDE 60 MG: 60 CAPSULE, DELAYED RELEASE ORAL at 20:29

## 2020-11-22 RX ADMIN — INSULIN LISPRO 4 UNITS: 100 INJECTION, SOLUTION INTRAVENOUS; SUBCUTANEOUS at 20:32

## 2020-11-22 RX ADMIN — IPRATROPIUM BROMIDE AND ALBUTEROL 1 PUFF: 20; 100 SPRAY, METERED RESPIRATORY (INHALATION) at 18:35

## 2020-11-22 RX ADMIN — Medication 10 ML: at 20:31

## 2020-11-22 RX ADMIN — IPRATROPIUM BROMIDE AND ALBUTEROL 1 PUFF: 20; 100 SPRAY, METERED RESPIRATORY (INHALATION) at 06:15

## 2020-11-22 RX ADMIN — METOPROLOL SUCCINATE 100 MG: 50 TABLET, EXTENDED RELEASE ORAL at 20:29

## 2020-11-22 RX ADMIN — GUAIFENESIN 600 MG: 600 TABLET, EXTENDED RELEASE ORAL at 12:06

## 2020-11-22 RX ADMIN — AMLODIPINE BESYLATE 5 MG: 5 TABLET ORAL at 12:05

## 2020-11-22 RX ADMIN — INSULIN LISPRO 4 UNITS: 100 INJECTION, SOLUTION INTRAVENOUS; SUBCUTANEOUS at 17:14

## 2020-11-22 RX ADMIN — REMDESIVIR 100 MG: 100 INJECTION, POWDER, LYOPHILIZED, FOR SOLUTION INTRAVENOUS at 12:06

## 2020-11-22 RX ADMIN — CLOPIDOGREL BISULFATE 75 MG: 75 TABLET ORAL at 12:05

## 2020-11-22 RX ADMIN — SODIUM CHLORIDE 30 ML: 9 INJECTION, SOLUTION INTRAVENOUS at 13:13

## 2020-11-22 RX ADMIN — ROSUVASTATIN CALCIUM 40 MG: 10 TABLET, FILM COATED ORAL at 12:03

## 2020-11-22 RX ADMIN — GUAIFENESIN 600 MG: 600 TABLET, EXTENDED RELEASE ORAL at 20:29

## 2020-11-22 RX ADMIN — DEXAMETHASONE 6 MG: 4 TABLET ORAL at 12:04

## 2020-11-22 RX ADMIN — LOSARTAN POTASSIUM 100 MG: 50 TABLET, FILM COATED ORAL at 12:05

## 2020-11-22 RX ADMIN — ACETAMINOPHEN 650 MG: 325 TABLET, FILM COATED ORAL at 04:21

## 2020-11-22 RX ADMIN — IPRATROPIUM BROMIDE AND ALBUTEROL 1 PUFF: 20; 100 SPRAY, METERED RESPIRATORY (INHALATION) at 12:15

## 2020-11-22 RX ADMIN — Medication 500 MG: at 20:30

## 2020-11-22 RX ADMIN — TAMSULOSIN HYDROCHLORIDE 0.4 MG: 0.4 CAPSULE ORAL at 20:29

## 2020-11-22 RX ADMIN — AMLODIPINE BESYLATE 5 MG: 5 TABLET ORAL at 20:29

## 2020-11-22 RX ADMIN — ASPIRIN 81 MG CHEWABLE TABLET 81 MG: 81 TABLET CHEWABLE at 12:04

## 2020-11-22 RX ADMIN — Medication 400 UNITS: at 12:03

## 2020-11-22 RX ADMIN — Medication 10 ML: at 12:07

## 2020-11-22 RX ADMIN — PANTOPRAZOLE SODIUM 40 MG: 40 TABLET, DELAYED RELEASE ORAL at 06:30

## 2020-11-22 RX ADMIN — BENZONATATE 100 MG: 100 CAPSULE ORAL at 04:20

## 2020-11-22 RX ADMIN — METOPROLOL SUCCINATE 100 MG: 50 TABLET, EXTENDED RELEASE ORAL at 12:04

## 2020-11-22 RX ADMIN — ENOXAPARIN SODIUM 40 MG: 40 INJECTION SUBCUTANEOUS at 20:28

## 2020-11-22 RX ADMIN — Medication 400 MG: at 12:05

## 2020-11-22 RX ADMIN — BENZONATATE 100 MG: 100 CAPSULE ORAL at 12:05

## 2020-11-22 RX ADMIN — INSULIN LISPRO 4 UNITS: 100 INJECTION, SOLUTION INTRAVENOUS; SUBCUTANEOUS at 12:11

## 2020-11-22 RX ADMIN — BENZONATATE 100 MG: 100 CAPSULE ORAL at 20:30

## 2020-11-22 RX ADMIN — INSULIN GLARGINE 15 UNITS: 100 INJECTION, SOLUTION SUBCUTANEOUS at 12:08

## 2020-11-22 RX ADMIN — INSULIN GLARGINE 15 UNITS: 100 INJECTION, SOLUTION SUBCUTANEOUS at 20:31

## 2020-11-22 ASSESSMENT — PAIN SCALES - GENERAL
PAINLEVEL_OUTOF10: 3
PAINLEVEL_OUTOF10: 0
PAINLEVEL_OUTOF10: 1
PAINLEVEL_OUTOF10: 1
PAINLEVEL_OUTOF10: 0

## 2020-11-22 NOTE — FLOWSHEET NOTE
11/22/20 0800   Provider Notification   Reason for Communication Evaluate  (Blood sugar 108 Updated Dr Pt appetite poor ? adm Lantus 50u)   Provider Name Dr. Jolanta Garcia   Provider Notification Physician   Method of Communication Face to face   Response Other (Comment)  (will address)

## 2020-11-22 NOTE — PROGRESS NOTES
Internal Medicine Progress Note    [facility]    Harvis Leventhal. Iliana Stratton., F.A.C.O.I. Genesis Honeycutt D.O., ROGERIOCPanOALYSSA More D.O. Khadijah Gomez, MSN, APRN, NP-C  Yuan Valdivia. Venkatesh Duckworth, MSN, APRN-CNP     Primary Care Physician: Satya Watts DO   Admitting Physician:  Saintclair Drew, DO  Admission date and time: 11/19/2020  3:59 PM    Room:  [address] [place] [address]  Admitting diagnosis: Acute respiratory failure with hypoxia [facility]) [J96.01]    Patient Name: Kacie Robb  MRN: [id number]    Date of Service: 11/22/2020     Subjective:  Roge Singh is a 69 y.o. male who was seen and examined today,11/22/2020, at the bedside. Roge Singh remains short of breath and overall uncomfortable. He remains on maximal Covid protocol with multiple subspecialists providing consultation. Review of System:   Constitutional:   Admits to generalized malaise and fatigue. HEENT:   Denies ear pain, sore throat, sinus or eye problems. Cardiovascular:   Denies any chest pain, irregular heartbeats, or palpitations. Respiratory:   Ongoing shortness of breath both at rest and with exertion. He continues to have coughing without significant sputum production. Gastrointestinal:   No current abdominal pain. No diarrhea or constipation. Genitourinary:    Denies any urgency, frequency, hematuria. Voiding without difficulty. Extremities:   Denies lower extremity swelling, edema or cyanosis. Neurology:    Denies any headache or focal neurological deficits, admits to generalized weakness and deconditioning. Psch:   Denies being anxious or depressed. Musculoskeletal:    Denies  myalgias, joint complaints or back pain. Integumentary:   Denies any rashes, ulcers, or excoriations. Denies bruising. Hematologic/Lymphatic:  Denies bruising or bleeding. Physical Exam:  No intake/output data recorded.     Intake/Output Summary (Last 24 hours) at 11/22/2020 0897  Last data filed at 11/22/2020 1763  Gross per 24 hour   Intake 605 ml   Output --   Net 605 ml   I/O last 3 completed shifts: In: 605 [P. O.:605]  Out: -   Patient Vitals for the past 96 hrs (Last 3 readings):   Weight   11/22/20 0700 260 lb (117.9 kg)   11/20/20 1122 256 lb 14.4 oz (116.5 kg)     Vital Signs:   Blood pressure 128/64, pulse 68, temperature 101.8 °F (38.8 °C), temperature source Temporal, resp. rate 20, height 6' 1\" (1.854 m), weight 260 lb (117.9 kg), SpO2 95 %. To prevent transmission of COVID-19 and also the need to preserve PPE, a physical examination of the patient was not directly performed but physical findings found by the nursing staff during their examination was discussed. The patient was evaluated via ZOOM, their symptoms were discussed, test results were reviewed and  questions were answered.       Medication:  Scheduled Meds:   remdesivir IVPB  100 mg Intravenous Q24H    insulin lispro  0-12 Units Subcutaneous TID WC    insulin lispro  0-6 Units Subcutaneous Nightly    metoprolol succinate  100 mg Oral BID    DULoxetine  60 mg Oral Nightly    tamsulosin  0.4 mg Oral Nightly    vitamin E  400 Units Oral BID    niacin  500 mg Oral Nightly    zinc sulfate  50 mg Oral Daily    guaiFENesin  600 mg Oral BID    vitamin D  50,000 Units Oral Weekly    aspirin  81 mg Oral Daily    amLODIPine  5 mg Oral BID    clopidogrel  75 mg Oral Daily    insulin glargine  50 Units Subcutaneous BID    losartan  100 mg Oral Daily    magnesium oxide  400 mg Oral Daily    pantoprazole  40 mg Oral QAM AC    sodium chloride flush  10 mL Intravenous 2 times per day    enoxaparin  40 mg Subcutaneous BID    albuterol-ipratropium  1 puff Inhalation Q6H    dexamethasone  6 mg Oral Daily    rosuvastatin  40 mg Oral Daily     Continuous Infusions:   dextrose         Objective Data:  CBC with Differential:    Lab Results   Component Value Date    WBC 11.5 11/21/2020    RBC 3.96 11/21/2020    HGB 12.4 11/21/2020    HCT 37.8 11/21/2020  11/21/2020    MCV 95.5 11/21/2020    MCH 31.3 11/21/2020    MCHC 32.8 11/21/2020    RDW 13.7 11/21/2020    SEGSPCT 76 03/11/2014    LYMPHOPCT 8.4 11/21/2020    MONOPCT 4.6 11/21/2020    BASOPCT 0.1 11/21/2020    MONOSABS 0.53 11/21/2020    LYMPHSABS 0.97 11/21/2020    EOSABS 0.00 11/21/2020    BASOSABS 0.01 11/21/2020     CMP:    Lab Results   Component Value Date     11/21/2020    K 3.6 11/21/2020    CL 98 11/21/2020    CO2 26 11/21/2020    BUN 27 11/21/2020    CREATININE 0.9 11/21/2020    GFRAA >60 11/21/2020    LABGLOM >60 11/21/2020    GLUCOSE 173 11/21/2020    PROT 6.8 11/21/2020    LABALBU 3.2 11/21/2020    CALCIUM 8.5 11/21/2020    BILITOT 0.4 11/21/2020    ALKPHOS 70 11/21/2020    AST 32 11/21/2020    ALT 18 11/21/2020         Assessment:  1. Sepsis (POA) secondary to Covid 19 infection resulting in acute respiratory failure with hypoxia  2. Coronary artery disease status post PCI  3. Chronic, compensated, combined systolic/diastolic congestive heart failure  4. Insulin-dependent diabetes mellitus type 2  5. History of neuromuscular disorder  6. Essential hypertension  7. Gastroesophageal reflux disease    Plan:   Maximal Covid protocol is being undertaken as we attempt nasal cannula oxygen weaning. He continues to have significant coughing and mucolytic therapy has been added. Chronic comorbidities are being monitored. Inflammatory markers are being monitored as well. We appreciate the input from the infectious disease team.    Greater than 40 minutes of critical care time was spent with the patient. This time included chart review, , and discussion with those consultants involved in the patient's care. More than 50% of my  time was spent at the bedside counseling/coordinating care with the patient and/or family with face to face contact. This time was spent reviewing notes and laboratory data as well as instructing and counseling the patient.  Time I spent with the family or surrogate(s) is included only if the patient was incapable of providing the necessary information or participating in medical decisions. I also discussed the differential diagnosis and all of the proposed management plans with the patient and individuals accompanying the patient. Sharon Matos requires this high level of physician care and nursing on the [facility] unit due the complexity of decision management and chance of rapid decline or death. Continued cardiac monitoring and higher level of nursing are required. I am readily available for any further decision-making and intervention.      Zackary Vasques D.O., F.A.C.O.I.  11/22/2020  8:38 AM

## 2020-11-22 NOTE — PROGRESS NOTES
[facility] PROGRESS NOTE    F/u SARS-COV-2 infection FACE TO FACE    SUBJECTIVE:  Kacie Robb, 69 y.o., male     Pt was discussed with care team  Has cough discussed with pt proning/side rotation    ROS:GENERAL-             GENERAL:Temperature:  Current - Temp: 97.6 °F (36.4 °C);  Max - Temp  Av.4 °F (37.4 °C)  Min: 97.6 °F (36.4 °C)  Max: 101.8 °F (38.8 °C)  Respiratory Rate : Resp  Av.5  Min: 18  Max: 20  Pulse Range: Pulse  Av.6  Min: 68  Max: 89  Blood Pressure Range:  Systolic (97OEL), FTL:914 , Min:128 , TVZ:042   ; Diastolic (72VRT), TUJ:51, Min:58, Max:72    Pulse ox Range: SpO2  Av %  Min: 91 %  Max: 95 %  24hr I & O:      Intake/Output Summary (Last 24 hours) at 2020 1456  Last data filed at 2020 0424  Gross per 24 hour   Intake 365 ml   Output --   Net 365 ml       CONSTITUTIONAL:   Awake, alert  HEENT:    AT/NC  LUNGS:   Dec bs   CARDIOVASCULAR:   S1 and S2   ABDOMEN:     Normal bowel sounds, non-distended, non-tender   EXTREMITIES:   FROM    SKIN:     NO RASH   CNS:    NAD  PSYCH:   Pleasant     MEDS:  insulin glargine (LANTUS) injection vial 15 Units, BID  guaiFENesin-dextromethorphan (ROBITUSSIN DM) 100-10 MG/5ML syrup 5 mL, Q4H PRN  remdesivir 100 mg in sodium chloride 0.9 % 250 mL IVPB, Q24H  0.9 % sodium chloride bolus, PRN  insulin lispro (HUMALOG) injection vial 0-12 Units, TID WC  insulin lispro (HUMALOG) injection vial 0-6 Units, Nightly  metoprolol succinate (TOPROL XL) extended release tablet 100 mg, BID  DULoxetine (CYMBALTA) extended release capsule 60 mg, Nightly  tamsulosin (FLOMAX) capsule 0.4 mg, Nightly  vitamin E capsule 400 Units, BID  niacin (SLO-NIACIN) extended release tablet 500 mg, Nightly  zinc sulfate (ZINCATE) capsule 50 mg, Daily  guaiFENesin (MUCINEX) extended release tablet 600 mg, BID  vitamin D (ERGOCALCIFEROL) capsule 50,000 Units, Weekly  polyvinyl alcohol (LIQUIFILM TEARS) 1.4 % ophthalmic solution 1 drop, PRN  loperamide (IMODIUM) capsule 2 mg, 4x Daily PRN  aspirin chewable tablet 81 mg, Daily  amLODIPine (NORVASC) tablet 5 mg, BID  clopidogrel (PLAVIX) tablet 75 mg, Daily  losartan (COZAAR) tablet 100 mg, Daily  magnesium oxide (MAG-OX) tablet 400 mg, Daily  pantoprazole (PROTONIX) tablet 40 mg, QAM AC  sodium chloride flush 0.9 % injection 10 mL, 2 times per day  sodium chloride flush 0.9 % injection 10 mL, PRN  acetaminophen (TYLENOL) tablet 650 mg, Q6H PRN    Or  acetaminophen (TYLENOL) suppository 650 mg, Q6H PRN  polyethylene glycol (GLYCOLAX) packet 17 g, Daily PRN  glucose (GLUTOSE) 40 % oral gel 15 g, PRN  dextrose 50 % IV solution, PRN  glucagon (rDNA) injection 1 mg, PRN  dextrose 5 % solution, PRN  enoxaparin (LOVENOX) injection 40 mg, BID  benzonatate (TESSALON) capsule 100 mg, TID PRN  albuterol-ipratropium (COMBIVENT RESPIMAT)  MCG/ACT inhaler 1 puff, Q6H  dexamethasone (DECADRON) tablet 6 mg, Daily  rosuvastatin (CRESTOR) tablet 40 mg, Daily          Data:  Lab Results   Component Value Date    COVID19 DETECTED 11/19/2020    COVID19 Not Detected 08/05/2020     COVID-19/SARS-COV-2 LABS  Recent Labs     11/19/20  1640  11/19/20  2335 11/20/20  0648 11/21/20  0756 11/22/20  0736   CRP  --   --  12.1*  --   --   --    PROCAL  --   --  0.14*  --   --   --    FERRITIN  --   --  320  --   --   --    LDH  --   --  186  --   --   --    TROPONINI <0.01  --  <0.01  --   --   --    DDIMER 313  --  340 444 315 347   FIBRINOGEN  --    < > >700* >700* >700* >700*   INR  --    < > 1.4 1.3 1.1 1.3   PROTIME  --    < > 15.7* 14.4* 13.0* 14.7*   AST 26  --   --  36 32 38   ALT 14  --   --  17 18 25    < > = values in this interval not displayed.      Lab Results   Component Value Date    CHOL 129 08/05/2020    TRIG 222 08/05/2020    HDL 44 08/05/2020    LDLCALC 41 08/05/2020    LABVLDL 44 08/05/2020     Lab Results   Component Value Date/Time    VITD25 22 (L) 11/19/2020 11:35 PM Recent Labs     11/20/20  0648 11/21/20  0756 11/22/20  0736   WBC 3.8* 11.5 9.7   HGB 13.6 12.4* 11.9*   HCT 42.2 37.8 35.5*    168 173   MCV 96.1 95.5 93.2   MCH 31.0 31.3 31.2   MCHC 32.2 32.8 33.5   RDW 13.8 13.7 13.5   LYMPHOPCT 18.4* 8.4* 6.9*   MONOPCT 5.1 4.6 5.7   BASOPCT 0.0 0.1 0.0   MONOSABS 0.19 0.53 0.55   LYMPHSABS 0.69* 0.97* 0.67*   EOSABS 0.00* 0.00* 0.00*   BASOSABS 0.00 0.01 0.00     Recent Labs     11/20/20  0648 11/20/20  0649 11/21/20  0756 11/22/20  0736     --  136 138   K 4.4  --  3.6 3.4*     --  98 101   CO2 28  --  26 26   BUN 14  --  27* 19   CREATININE 0.8  --  0.9 0.8   GFRAA >60  --  >60 >60   LABGLOM >60  --  >60 >60   GLUCOSE 327* 302 173* 100*   PROT 7.6  --  6.8 6.7   LABALBU 3.6  --  3.2* 3.0*   CALCIUM 8.8  --  8.5* 8.3*   BILITOT 0.4  --  0.4 0.4   ALKPHOS 83  --  70 84   AST 36  --  32 38   ALT 17  --  18 25     U/A:    Lab Results   Component Value Date    COLORU DKYELLOW 10/02/2014    PROTEINU TRACE 10/02/2014    PHUR 6.5 10/02/2014    WBCUA NONE 10/02/2014    RBCUA NONE 10/02/2014    BACTERIA NONE 10/02/2014    CLARITYU Clear 10/02/2014    SPECGRAV 1.025 10/02/2014    LEUKOCYTESUR Negative 10/02/2014    UROBILINOGEN 0.2 10/02/2014    BILIRUBINUR SMALL 10/02/2014    BLOODU TRACE 10/02/2014    GLUCOSEU Negative 10/02/2014        MICRO  Blood cultures   Blood Culture, Routine   Date Value Ref Range Status   11/19/2020 24 Hours no growth  Preliminary       ASSESSMENT:    Active Hospital Problems    Diagnosis Date Noted    COVID-19 [U07.1] 11/20/2020    Acute respiratory failure with hypoxia ([facility] [address] [J96.01] 11/19/2020       SARS-COV-2- positive with pneumonia on 6L  Leukopenia resolved  Fevers  [id number]  · Remdesivir day 2   · DECADRON 6MG QDAY     · Vitamins : vitamin C, thiamine, zinc, vitamin D  · Discussed proning, side to side positions  · Is   · Inc activity             Follow COVID-19/SARS-COV-2- [facility]        Baseline triglyceride/LIPID PANEL DVT prophylaxis/TREATMENT    PLAN: CONTINUE CURRENT MEDICATIONS AND SUPPORTIVE CARE.       Electronically signed by Jose Mays MD on 11/22/20 at 2:56 PM EST

## 2020-11-22 NOTE — PLAN OF CARE
Problem: Airway Clearance - Ineffective  Goal: Achieve or maintain patent airway  11/22/2020 0151 by Reid Pop RN  Outcome: Met This Shift  11/21/2020 1554 by Misty Barr RN  Outcome: Met This Shift     Problem: Gas Exchange - Impaired  Goal: Absence of hypoxia  11/22/2020 0151 by Reid Pop RN  Outcome: Met This Shift  11/21/2020 1554 by Misty Barr RN  Outcome: Met This Shift  Goal: Promote optimal lung function  11/22/2020 0151 by Reid Pop RN  Outcome: Met This Shift  11/21/2020 1554 by Misty Barr RN  Outcome: Met This Shift     Problem: Breathing Pattern - Ineffective  Goal: Ability to achieve and maintain a regular respiratory rate  11/22/2020 0151 by Reid Pop RN  Outcome: Met This Shift  11/21/2020 1554 by Misty Barr RN  Outcome: Ongoing     Problem:  Body Temperature -  Risk of, Imbalanced  Goal: Ability to maintain a body temperature within defined limits  11/22/2020 0151 by Reid Pop RN  Outcome: Met This Shift  11/21/2020 1554 by Misty Barr RN  Outcome: Met This Shift  Goal: Will regain or maintain usual level of consciousness  11/22/2020 0151 by Reid Pop RN  Outcome: Met This Shift  11/21/2020 1554 by Misty Barr RN  Outcome: Met This Shift  Goal: Complications related to the disease process, condition or treatment will be avoided or minimized  11/22/2020 0151 by Reid Pop RN  Outcome: Met This Shift  11/21/2020 1554 by Misty Barr RN  Outcome: Met This Shift     Problem: Isolation Precautions - Risk of Spread of Infection  Goal: Prevent transmission of infection  11/22/2020 0151 by Reid Pop RN  Outcome: Met This Shift  11/21/2020 1554 by Misty Barr RN  Outcome: Ongoing     Problem: Nutrition Deficits  Goal: Optimize nutrtional status  11/22/2020 0151 by Reid Pop RN  Outcome: Met This Shift  11/21/2020 1554 by Misty Barr RN  Outcome: Ongoing     Problem: Risk for Fluid Volume Deficit  Goal: Maintain normal heart rhythm  11/22/2020 0151 by Abigail Brunner RN  Outcome: Met This Shift  11/21/2020 1554 by Lanny Devlin RN  Outcome: Ongoing  Goal: Maintain absence of muscle cramping  11/22/2020 0151 by Abigail Brunner RN  Outcome: Met This Shift  11/21/2020 1554 by Lanny Devlin RN  Outcome: Ongoing  Goal: Maintain normal serum potassium, sodium, calcium, phosphorus, and pH  11/22/2020 0151 by Abigail Brunner RN  Outcome: Met This Shift  11/21/2020 1554 by Lanny Devlin RN  Outcome: Ongoing     Problem: Loneliness or Risk for Loneliness  Goal: Demonstrate positive use of time alone when socialization is not possible  Outcome: Met This Shift     Problem: Fatigue  Goal: Verbalize increase energy and improved vitality  11/22/2020 0151 by Abigail Brunner RN  Outcome: Met This Shift  11/21/2020 1554 by Lanny Devlin RN  Outcome: Ongoing     Problem: Patient Education: Go to Patient Education Activity  Goal: Patient/Family Education  11/22/2020 0151 by Abigail Brunner RN  Outcome: Met This Shift  11/21/2020 1554 by Lanny Devlin RN  Outcome: Ongoing     Problem: Falls - Risk of:  Goal: Will remain free from falls  Description: Will remain free from falls  11/22/2020 0151 by Abigail Brunner RN  Outcome: Met This Shift  11/21/2020 1554 by Lanny Devlin RN  Outcome: Met This Shift  Goal: Absence of physical injury  Description: Absence of physical injury  11/22/2020 0151 by Abigail Brunner RN  Outcome: Met This Shift  11/21/2020 1554 by Lanny Devlin RN  Outcome: Met This Shift

## 2020-11-23 LAB
ALBUMIN SERPL-MCNC: 2.8 G/DL (ref 3.5–5.2)
ALP BLD-CCNC: 78 U/L (ref 40–129)
ALT SERPL-CCNC: 30 U/L (ref 0–40)
ANION GAP SERPL CALCULATED.3IONS-SCNC: 11 MMOL/L (ref 7–16)
APTT: 30 SEC (ref 24.5–35.1)
AST SERPL-CCNC: 35 U/L (ref 0–39)
BASOPHILS ABSOLUTE: 0.01 E9/L (ref 0–0.2)
BASOPHILS RELATIVE PERCENT: 0.1 % (ref 0–2)
BILIRUB SERPL-MCNC: 0.5 MG/DL (ref 0–1.2)
BUN BLDV-MCNC: 16 MG/DL (ref 8–23)
C-REACTIVE PROTEIN: 5.2 MG/DL (ref 0–0.4)
CALCIUM SERPL-MCNC: 8.2 MG/DL (ref 8.6–10.2)
CHLORIDE BLD-SCNC: 102 MMOL/L (ref 98–107)
CHOLESTEROL, TOTAL: 64 MG/DL (ref 0–199)
CO2: 24 MMOL/L (ref 22–29)
CREAT SERPL-MCNC: 0.7 MG/DL (ref 0.7–1.2)
D DIMER: 430 NG/ML DDU
EOSINOPHILS ABSOLUTE: 0 E9/L (ref 0.05–0.5)
EOSINOPHILS RELATIVE PERCENT: 0 % (ref 0–6)
FERRITIN: 373 NG/ML
FIBRINOGEN: >700 MG/DL (ref 225–540)
GFR AFRICAN AMERICAN: >60
GFR NON-AFRICAN AMERICAN: >60 ML/MIN/1.73
GLUCOSE BLD-MCNC: 191 MG/DL (ref 74–99)
HCT VFR BLD CALC: 35.7 % (ref 37–54)
HDLC SERPL-MCNC: 26 MG/DL
HEMOGLOBIN: 12 G/DL (ref 12.5–16.5)
IMMATURE GRANULOCYTES #: 0.06 E9/L
IMMATURE GRANULOCYTES %: 0.7 % (ref 0–5)
INR BLD: 1.3
LACTATE DEHYDROGENASE: 259 U/L (ref 135–225)
LDL CHOLESTEROL CALCULATED: 23 MG/DL (ref 0–99)
LYMPHOCYTES ABSOLUTE: 0.72 E9/L (ref 1.5–4)
LYMPHOCYTES RELATIVE PERCENT: 8.3 % (ref 20–42)
MCH RBC QN AUTO: 30.9 PG (ref 26–35)
MCHC RBC AUTO-ENTMCNC: 33.6 % (ref 32–34.5)
MCV RBC AUTO: 92 FL (ref 80–99.9)
METER GLUCOSE: 186 MG/DL (ref 74–99)
METER GLUCOSE: 334 MG/DL (ref 74–99)
METER GLUCOSE: 336 MG/DL (ref 74–99)
MONOCYTES ABSOLUTE: 0.54 E9/L (ref 0.1–0.95)
MONOCYTES RELATIVE PERCENT: 6.2 % (ref 2–12)
NEUTROPHILS ABSOLUTE: 7.32 E9/L (ref 1.8–7.3)
NEUTROPHILS RELATIVE PERCENT: 84.7 % (ref 43–80)
PDW BLD-RTO: 13.3 FL (ref 11.5–15)
PLATELET # BLD: 180 E9/L (ref 130–450)
PMV BLD AUTO: 11 FL (ref 7–12)
POTASSIUM SERPL-SCNC: 3.6 MMOL/L (ref 3.5–5)
PROTHROMBIN TIME: 15 SEC (ref 9.3–12.4)
RBC # BLD: 3.88 E12/L (ref 3.8–5.8)
SEDIMENTATION RATE, ERYTHROCYTE: 88 MM/HR (ref 0–15)
SODIUM BLD-SCNC: 137 MMOL/L (ref 132–146)
TOTAL CK: 215 U/L (ref 20–200)
TOTAL PROTEIN: 6.4 G/DL (ref 6.4–8.3)
TRIGL SERPL-MCNC: 74 MG/DL (ref 0–149)
TROPONIN: <0.01 NG/ML (ref 0–0.03)
VITAMIN D 25-HYDROXY: 20 NG/ML (ref 30–100)
VLDLC SERPL CALC-MCNC: 15 MG/DL
WBC # BLD: 8.7 E9/L (ref 4.5–11.5)

## 2020-11-23 PROCEDURE — 36415 COLL VENOUS BLD VENIPUNCTURE: CPT

## 2020-11-23 PROCEDURE — 85378 FIBRIN DEGRADE SEMIQUANT: CPT

## 2020-11-23 PROCEDURE — 85651 RBC SED RATE NONAUTOMATED: CPT

## 2020-11-23 PROCEDURE — 6370000000 HC RX 637 (ALT 250 FOR IP): Performed by: INTERNAL MEDICINE

## 2020-11-23 PROCEDURE — 84484 ASSAY OF TROPONIN QUANT: CPT

## 2020-11-23 PROCEDURE — 2700000000 HC OXYGEN THERAPY PER DAY

## 2020-11-23 PROCEDURE — 85384 FIBRINOGEN ACTIVITY: CPT

## 2020-11-23 PROCEDURE — 2500000003 HC RX 250 WO HCPCS: Performed by: INTERNAL MEDICINE

## 2020-11-23 PROCEDURE — 82306 VITAMIN D 25 HYDROXY: CPT

## 2020-11-23 PROCEDURE — 82728 ASSAY OF FERRITIN: CPT

## 2020-11-23 PROCEDURE — 2580000003 HC RX 258: Performed by: INTERNAL MEDICINE

## 2020-11-23 PROCEDURE — 80053 COMPREHEN METABOLIC PANEL: CPT

## 2020-11-23 PROCEDURE — 82550 ASSAY OF CK (CPK): CPT

## 2020-11-23 PROCEDURE — 94640 AIRWAY INHALATION TREATMENT: CPT

## 2020-11-23 PROCEDURE — 84145 PROCALCITONIN (PCT): CPT

## 2020-11-23 PROCEDURE — 86140 C-REACTIVE PROTEIN: CPT

## 2020-11-23 PROCEDURE — 2060000000 HC ICU INTERMEDIATE R&B

## 2020-11-23 PROCEDURE — 85730 THROMBOPLASTIN TIME PARTIAL: CPT

## 2020-11-23 PROCEDURE — 85610 PROTHROMBIN TIME: CPT

## 2020-11-23 PROCEDURE — 80061 LIPID PANEL: CPT

## 2020-11-23 PROCEDURE — 82962 GLUCOSE BLOOD TEST: CPT

## 2020-11-23 PROCEDURE — 83615 LACTATE (LD) (LDH) ENZYME: CPT

## 2020-11-23 PROCEDURE — XW13325 TRANSFUSION OF CONVALESCENT PLASMA (NONAUTOLOGOUS) INTO PERIPHERAL VEIN, PERCUTANEOUS APPROACH, NEW TECHNOLOGY GROUP 5: ICD-10-PCS | Performed by: SPECIALIST

## 2020-11-23 PROCEDURE — 6360000002 HC RX W HCPCS: Performed by: INTERNAL MEDICINE

## 2020-11-23 PROCEDURE — 85025 COMPLETE CBC W/AUTO DIFF WBC: CPT

## 2020-11-23 RX ORDER — 0.9 % SODIUM CHLORIDE 0.9 %
20 INTRAVENOUS SOLUTION INTRAVENOUS ONCE
Status: COMPLETED | OUTPATIENT
Start: 2020-11-23 | End: 2020-11-24

## 2020-11-23 RX ADMIN — ASPIRIN 81 MG CHEWABLE TABLET 81 MG: 81 TABLET CHEWABLE at 09:27

## 2020-11-23 RX ADMIN — Medication 10 ML: at 09:28

## 2020-11-23 RX ADMIN — INSULIN GLARGINE 15 UNITS: 100 INJECTION, SOLUTION SUBCUTANEOUS at 10:49

## 2020-11-23 RX ADMIN — ZINC SULFATE 220 MG (50 MG) CAPSULE 50 MG: CAPSULE at 09:27

## 2020-11-23 RX ADMIN — METOPROLOL SUCCINATE 100 MG: 50 TABLET, EXTENDED RELEASE ORAL at 21:49

## 2020-11-23 RX ADMIN — INSULIN LISPRO 8 UNITS: 100 INJECTION, SOLUTION INTRAVENOUS; SUBCUTANEOUS at 12:22

## 2020-11-23 RX ADMIN — CLOPIDOGREL BISULFATE 75 MG: 75 TABLET ORAL at 09:27

## 2020-11-23 RX ADMIN — TAMSULOSIN HYDROCHLORIDE 0.4 MG: 0.4 CAPSULE ORAL at 21:50

## 2020-11-23 RX ADMIN — INSULIN LISPRO 2 UNITS: 100 INJECTION, SOLUTION INTRAVENOUS; SUBCUTANEOUS at 09:39

## 2020-11-23 RX ADMIN — IPRATROPIUM BROMIDE AND ALBUTEROL 1 PUFF: 20; 100 SPRAY, METERED RESPIRATORY (INHALATION) at 18:40

## 2020-11-23 RX ADMIN — INSULIN LISPRO 4 UNITS: 100 INJECTION, SOLUTION INTRAVENOUS; SUBCUTANEOUS at 22:02

## 2020-11-23 RX ADMIN — BENZONATATE 100 MG: 100 CAPSULE ORAL at 09:27

## 2020-11-23 RX ADMIN — INSULIN GLARGINE 15 UNITS: 100 INJECTION, SOLUTION SUBCUTANEOUS at 22:03

## 2020-11-23 RX ADMIN — Medication 10 ML: at 21:51

## 2020-11-23 RX ADMIN — DEXAMETHASONE 6 MG: 4 TABLET ORAL at 10:50

## 2020-11-23 RX ADMIN — ENOXAPARIN SODIUM 40 MG: 40 INJECTION SUBCUTANEOUS at 09:27

## 2020-11-23 RX ADMIN — ACETAMINOPHEN 650 MG: 325 TABLET, FILM COATED ORAL at 09:28

## 2020-11-23 RX ADMIN — METOPROLOL SUCCINATE 100 MG: 50 TABLET, EXTENDED RELEASE ORAL at 09:28

## 2020-11-23 RX ADMIN — AMLODIPINE BESYLATE 5 MG: 5 TABLET ORAL at 09:28

## 2020-11-23 RX ADMIN — Medication 400 MG: at 09:28

## 2020-11-23 RX ADMIN — PANTOPRAZOLE SODIUM 40 MG: 40 TABLET, DELAYED RELEASE ORAL at 06:24

## 2020-11-23 RX ADMIN — GUAIFENESIN 600 MG: 600 TABLET, EXTENDED RELEASE ORAL at 09:28

## 2020-11-23 RX ADMIN — DULOXETINE HYDROCHLORIDE 60 MG: 60 CAPSULE, DELAYED RELEASE ORAL at 21:49

## 2020-11-23 RX ADMIN — AMLODIPINE BESYLATE 5 MG: 5 TABLET ORAL at 21:50

## 2020-11-23 RX ADMIN — LOSARTAN POTASSIUM 100 MG: 50 TABLET, FILM COATED ORAL at 10:53

## 2020-11-23 RX ADMIN — GUAIFENESIN 600 MG: 600 TABLET, EXTENDED RELEASE ORAL at 21:50

## 2020-11-23 RX ADMIN — ROSUVASTATIN CALCIUM 40 MG: 10 TABLET, FILM COATED ORAL at 09:27

## 2020-11-23 RX ADMIN — IPRATROPIUM BROMIDE AND ALBUTEROL 1 PUFF: 20; 100 SPRAY, METERED RESPIRATORY (INHALATION) at 22:35

## 2020-11-23 RX ADMIN — Medication 400 UNITS: at 10:50

## 2020-11-23 RX ADMIN — ENOXAPARIN SODIUM 40 MG: 40 INJECTION SUBCUTANEOUS at 21:50

## 2020-11-23 RX ADMIN — REMDESIVIR 100 MG: 100 INJECTION, POWDER, LYOPHILIZED, FOR SOLUTION INTRAVENOUS at 12:22

## 2020-11-23 ASSESSMENT — PAIN SCALES - GENERAL
PAINLEVEL_OUTOF10: 0

## 2020-11-23 ASSESSMENT — ENCOUNTER SYMPTOMS
RHINORRHEA: 0
ABDOMINAL PAIN: 0
VOMITING: 0
NAUSEA: 0
DIARRHEA: 0
ABDOMINAL DISTENTION: 0

## 2020-11-23 NOTE — PROGRESS NOTES
[facility] PROGRESS NOTE    F/u SARS-COV-2 infection   FACE TO FACE    SUBJECTIVE:  Francisco Lee, 69 y.o., male     Pt was discussed with care team  Has cough discussed with pt proning/side rotation-again. Currently on 7 liters   tmax- 100.7  Room is really hot- 85 degrees. ROS:GENERAL- as above             GENERAL:Temperature:  Current - Temp: 99.5 °F (37.5 °C);  Max - Temp  Av.7 °F (37.1 °C)  Min: 97.1 °F (36.2 °C)  Max: 100.7 °F (38.2 °C)  Respiratory Rate : Resp  Av.7  Min: 16  Max: 20  Pulse Range: Pulse  Av.3  Min: 71  Max: 80  Blood Pressure Range:  Systolic (09MTG), IQS:150 , Min:110 , PIE:770   ; Diastolic (26BJS), PHK:78, Min:68, Max:92    Pulse ox Range: SpO2  Av.3 %  Min: 92 %  Max: 93 %  24hr I & O:      Intake/Output Summary (Last 24 hours) at 2020 1523  Last data filed at 2020 1430  Gross per 24 hour   Intake 440 ml   Output --   Net 440 ml       CONSTITUTIONAL:   Awake, alert  HEENT:    AT/NC  LUNGS:   Dec bs   CARDIOVASCULAR:   S1 and S2   ABDOMEN:     Normal bowel sounds, non-distended, non-tender   EXTREMITIES:   FROM    SKIN:     NO RASH   CNS:    NAD  PSYCH:   Pleasant     MEDS:  insulin glargine (LANTUS) injection vial 15 Units, BID  guaiFENesin-dextromethorphan (ROBITUSSIN DM) 100-10 MG/5ML syrup 5 mL, Q4H PRN  remdesivir 100 mg in sodium chloride 0.9 % 250 mL IVPB, Q24H  0.9 % sodium chloride bolus, PRN  insulin lispro (HUMALOG) injection vial 0-12 Units, TID WC  insulin lispro (HUMALOG) injection vial 0-6 Units, Nightly  metoprolol succinate (TOPROL XL) extended release tablet 100 mg, BID  DULoxetine (CYMBALTA) extended release capsule 60 mg, Nightly  tamsulosin (FLOMAX) capsule 0.4 mg, Nightly  vitamin E capsule 400 Units, BID  niacin (SLO-NIACIN) extended release tablet 500 mg, Nightly  zinc sulfate (ZINCATE) capsule 50 mg, Daily  guaiFENesin (MUCINEX) extended release tablet 600 mg, BID  vitamin D (ERGOCALCIFEROL) capsule 50,000 Units, Weekly  polyvinyl alcohol (LIQUIFILM TEARS) 1.4 % ophthalmic solution 1 drop, PRN  loperamide (IMODIUM) capsule 2 mg, 4x Daily PRN  aspirin chewable tablet 81 mg, Daily  amLODIPine (NORVASC) tablet 5 mg, BID  clopidogrel (PLAVIX) tablet 75 mg, Daily  losartan (COZAAR) tablet 100 mg, Daily  magnesium oxide (MAG-OX) tablet 400 mg, Daily  pantoprazole (PROTONIX) tablet 40 mg, QAM AC  sodium chloride flush 0.9 % injection 10 mL, 2 times per day  sodium chloride flush 0.9 % injection 10 mL, PRN  acetaminophen (TYLENOL) tablet 650 mg, Q6H PRN    Or  acetaminophen (TYLENOL) suppository 650 mg, Q6H PRN  polyethylene glycol (GLYCOLAX) packet 17 g, Daily PRN  glucose (GLUTOSE) 40 % oral gel 15 g, PRN  dextrose 50 % IV solution, PRN  glucagon (rDNA) injection 1 mg, PRN  dextrose 5 % solution, PRN  enoxaparin (LOVENOX) injection 40 mg, BID  benzonatate (TESSALON) capsule 100 mg, TID PRN  albuterol-ipratropium (COMBIVENT RESPIMAT)  MCG/ACT inhaler 1 puff, Q6H  dexamethasone (DECADRON) tablet 6 mg, Daily  rosuvastatin (CRESTOR) tablet 40 mg, Daily          Data:  Lab Results   Component Value Date    COVID19 DETECTED 11/19/2020    COVID19 Not Detected 08/05/2020     COVID-19/SARS-COV-2 LABS  Recent Labs     11/21/20  0756 11/22/20  0736 11/23/20  0656   CRP  --   --  5.2*   FERRITIN  --   --  373   LDH  --   --  259*   TROPONINI  --   --  <0.01   DDIMER 315 347 430   FIBRINOGEN >700* >700* >700*   INR 1.1 1.3 1.3   PROTIME 13.0* 14.7* 15.0*   AST 32 38 35   ALT 18 25 30   TRIG  --   --  74     Lab Results   Component Value Date    CHOL 64 11/23/2020    TRIG 74 11/23/2020    HDL 26 11/23/2020    LDLCALC 23 11/23/2020    LABVLDL 15 11/23/2020     Lab Results   Component Value Date/Time    VITD25 20 (L) 11/23/2020 06:56 AM     Recent Labs     11/21/20  0756 11/22/20  0736 11/23/20  0656   WBC 11.5 9.7 8.7   HGB 12.4* 11.9* 12.0*   HCT 37.8 35.5* 35.7*    173 180   MCV 95.5 93.2 PPE  Spoke with pt on the phone  He is feeling ok he has no f/c/n/v/d  tmax 100.7 but room was hot pt was moved to another room  No issues with meds  SARS-COV-2 pneumonia on 7L order convalescent plasma    · Remdesivir    · Decadron 6mg qday   · Vitamins   · Check biomarkers  · Discussed proning  ·    · DVT prophylaxis/TREATMENT

## 2020-11-23 NOTE — CARE COORDINATION
SS Note: COVID POSITIVE 11/19/20. VIRAL Camarena completed ACP on 11/20. SW spoke with pt via telephone since he is in Covid isolation for transition of care. Pt stated he resides with his wife in a one floor home, four steps to enter home. Stated was independent prior to admission including driving. Stated no past SNF, HHC, or DME. Patient has prescription coverage, uses [address]. Stated wife tested positive for Covid so he is uncertain yet which family member will transport him home at dc. Pt plans to return home upon dc, denies needing HHC. Stated if Home O2 needed he has no company preference, SW would need O2 order and O2 sat testing.   Electronically signed by IFEANYI Pena on 11/23/2020 at 11:30 AM

## 2020-11-23 NOTE — PROGRESS NOTES
Internal Medicine Progress Note    [facility]    [facility]. Venu Mosquera, MILENAOPanIPan Douglas D.O., HILDA Waite, MSN, APRN, NP-C  Bhavya Rutherford. Adebayo Washington, MSN, APRN-CNP     Primary Care Physician: Solomon Fletcher DO   Admitting Physician:  Gee Luis DO  Admission date and time: 11/19/2020  3:59 PM    Room:  [address] [place] [address]  Admitting diagnosis: Acute respiratory failure with hypoxia [facility]) [J96.01]    Patient Name: Jose Carlos Duncan  MRN: [id number]    Date of Service: 11/23/2020     Subjective:  Carlie Stoll is a 69 y.o. male who was seen and examined today,11/23/2020, at the bedside. Carlie Stoll appears far more comfortable today but remains on 6 L of nasal cannula oxygen. We discussed the absolute need to become more ambulatory today and to spend a greater deal of time in the bedside chair. Nursing reports poor motivation and he spends a great deal of his time in bed. We discussed the ongoing use of the incentive spirometer. Maximal Covid protocol is being undertaken. Review of System:   Constitutional:   Admits to improving generalized malaise and fatigue. HEENT:   Denies ear pain, sore throat, sinus or eye problems. Cardiovascular:   Denies any chest pain, irregular heartbeats, or palpitations. Respiratory:   Shortness of breath persists but is improving to some degree. Gastrointestinal:   No current abdominal pain. No diarrhea or constipation. Genitourinary:    Denies any urgency, frequency, hematuria. Voiding without difficulty. Extremities:   Denies lower extremity swelling, edema or cyanosis. Neurology:    Denies any headache or focal neurological deficits, admits to generalized weakness and deconditioning. Psch:   Denies being anxious or depressed. Musculoskeletal:    Denies  myalgias, joint complaints or back pain. Integumentary:   Denies any rashes, ulcers, or excoriations.   Denies bruising. Hematologic/Lymphatic:  Denies bruising or bleeding. Physical Exam:  No intake/output data recorded. No intake or output data in the 24 hours ending 11/23/20 0853No intake/output data recorded. Patient Vitals for the past 96 hrs (Last 3 readings):   Weight   11/22/20 0700 260 lb (117.9 kg)   11/20/20 1122 256 lb 14.4 oz (116.5 kg)     Vital Signs:   Blood pressure 134/80, pulse 71, temperature 100.7 °F (38.2 °C), temperature source Infrared, resp. rate 17, height 6' 1\" (1.854 m), weight 260 lb (117.9 kg), SpO2 92 %. To prevent transmission of COVID-19 and also the need to preserve PPE, a physical examination of the patient was not directly performed but physical findings found by the nursing staff during their examination was discussed. The patient was evaluated via ZOOM, their symptoms were discussed, test results were reviewed and  questions were answered.       Medication:  Scheduled Meds:   insulin glargine  15 Units Subcutaneous BID    remdesivir IVPB  100 mg Intravenous Q24H    insulin lispro  0-12 Units Subcutaneous TID WC    insulin lispro  0-6 Units Subcutaneous Nightly    metoprolol succinate  100 mg Oral BID    DULoxetine  60 mg Oral Nightly    tamsulosin  0.4 mg Oral Nightly    vitamin E  400 Units Oral BID    niacin  500 mg Oral Nightly    zinc sulfate  50 mg Oral Daily    guaiFENesin  600 mg Oral BID    vitamin D  50,000 Units Oral Weekly    aspirin  81 mg Oral Daily    amLODIPine  5 mg Oral BID    clopidogrel  75 mg Oral Daily    losartan  100 mg Oral Daily    magnesium oxide  400 mg Oral Daily    pantoprazole  40 mg Oral QAM AC    sodium chloride flush  10 mL Intravenous 2 times per day    enoxaparin  40 mg Subcutaneous BID    albuterol-ipratropium  1 puff Inhalation Q6H    dexamethasone  6 mg Oral Daily    rosuvastatin  40 mg Oral Daily     Continuous Infusions:   dextrose         Objective Data:  CBC with Differential:    Lab Results   Component Value Date bedside counseling/coordinating care with the patient and/or family with face to face contact. This time was spent reviewing notes and laboratory data as well as instructing and counseling the patient. Time I spent with the family or surrogate(s) is included only if the patient was incapable of providing the necessary information or participating in medical decisions. I also discussed the differential diagnosis and all of the proposed management plans with the patient and individuals accompanying the patient. Gisele Bustillo requires this high level of physician care and nursing on the [facility] unit due the complexity of decision management and chance of rapid decline or death. Continued cardiac monitoring and higher level of nursing are required. I am readily available for any further decision-making and intervention.      Marcella Jones D.O., F.A.C.O.I.  11/23/2020  8:53 AM

## 2020-11-23 NOTE — PLAN OF CARE
Problem: Airway Clearance - Ineffective  Goal: Achieve or maintain patent airway  Outcome: Met This Shift     Problem: Body Temperature -  Risk of, Imbalanced  Goal: Ability to maintain a body temperature within defined limits  Outcome: Met This Shift     Problem: Body Temperature -  Risk of, Imbalanced  Goal: Will regain or maintain usual level of consciousness  Outcome: Met This Shift     Problem:  Body Temperature -  Risk of, Imbalanced  Goal: Complications related to the disease process, condition or treatment will be avoided or minimized  Outcome: Met This Shift     Problem: Isolation Precautions - Risk of Spread of Infection  Goal: Prevent transmission of infection  Outcome: Met This Shift     Problem: Risk for Fluid Volume Deficit  Goal: Maintain absence of muscle cramping  Outcome: Met This Shift     Problem: Risk for Fluid Volume Deficit  Goal: Maintain normal serum potassium, sodium, calcium, phosphorus, and pH  Outcome: Met This Shift     Problem: Risk for Fluid Volume Deficit  Goal: Maintain normal serum potassium, sodium, calcium, phosphorus, and pH  Outcome: Met This Shift     Problem: Loneliness or Risk for Loneliness  Goal: Demonstrate positive use of time alone when socialization is not possible  Outcome: Met This Shift     Problem: Patient Education: Go to Patient Education Activity  Goal: Patient/Family Education  Outcome: Met This Shift     Problem: Falls - Risk of:  Goal: Will remain free from falls  Description: Will remain free from falls  Outcome: Met This Shift     Problem: Falls - Risk of:  Goal: Absence of physical injury  Description: Absence of physical injury  Outcome: Met This Shift     Problem: Gas Exchange - Impaired  Goal: Absence of hypoxia  Outcome: Ongoing     Problem: Gas Exchange - Impaired  Goal: Promote optimal lung function  Outcome: Ongoing     Problem: Breathing Pattern - Ineffective  Goal: Ability to achieve and maintain a regular respiratory rate  Outcome: Ongoing Problem: Nutrition Deficits  Goal: Optimize nutrtional status  Outcome: Ongoing     Problem: Risk for Fluid Volume Deficit  Goal: Maintain normal heart rhythm  Outcome: Ongoing     Problem: Fatigue  Goal: Verbalize increase energy and improved vitality  Outcome: Ongoing

## 2020-11-24 LAB
ABO/RH: NORMAL
ALBUMIN SERPL-MCNC: 2.8 G/DL (ref 3.5–5.2)
ALP BLD-CCNC: 87 U/L (ref 40–129)
ALT SERPL-CCNC: 37 U/L (ref 0–40)
ANION GAP SERPL CALCULATED.3IONS-SCNC: 12 MMOL/L (ref 7–16)
ANTIBODY SCREEN: NORMAL
APTT: 30.8 SEC (ref 24.5–35.1)
AST SERPL-CCNC: 31 U/L (ref 0–39)
BASOPHILS ABSOLUTE: 0 E9/L (ref 0–0.2)
BASOPHILS RELATIVE PERCENT: 0.1 % (ref 0–2)
BILIRUB SERPL-MCNC: 0.5 MG/DL (ref 0–1.2)
BLOOD BANK DISPENSE STATUS: NORMAL
BLOOD BANK PRODUCT CODE: NORMAL
BLOOD CULTURE, ROUTINE: NORMAL
BPU ID: NORMAL
BUN BLDV-MCNC: 16 MG/DL (ref 8–23)
BURR CELLS: ABNORMAL
CALCIUM SERPL-MCNC: 8.4 MG/DL (ref 8.6–10.2)
CHLORIDE BLD-SCNC: 100 MMOL/L (ref 98–107)
CO2: 24 MMOL/L (ref 22–29)
CREAT SERPL-MCNC: 0.7 MG/DL (ref 0.7–1.2)
D DIMER: 369 NG/ML DDU
DESCRIPTION BLOOD BANK: NORMAL
EOSINOPHILS ABSOLUTE: 0 E9/L (ref 0.05–0.5)
EOSINOPHILS RELATIVE PERCENT: 0 % (ref 0–6)
FIBRINOGEN: 656 MG/DL (ref 225–540)
GFR AFRICAN AMERICAN: >60
GFR NON-AFRICAN AMERICAN: >60 ML/MIN/1.73
GLUCOSE BLD-MCNC: 333 MG/DL (ref 74–99)
HCT VFR BLD CALC: 34.4 % (ref 37–54)
HEMOGLOBIN: 11.9 G/DL (ref 12.5–16.5)
INR BLD: 1.3
LYMPHOCYTES ABSOLUTE: 0.71 E9/L (ref 1.5–4)
LYMPHOCYTES RELATIVE PERCENT: 9.6 % (ref 20–42)
MCH RBC QN AUTO: 31.2 PG (ref 26–35)
MCHC RBC AUTO-ENTMCNC: 34.6 % (ref 32–34.5)
MCV RBC AUTO: 90.1 FL (ref 80–99.9)
METER GLUCOSE: 256 MG/DL (ref 74–99)
METER GLUCOSE: 299 MG/DL (ref 74–99)
METER GLUCOSE: 326 MG/DL (ref 74–99)
METER GLUCOSE: 363 MG/DL (ref 74–99)
MONOCYTES ABSOLUTE: 0.5 E9/L (ref 0.1–0.95)
MONOCYTES RELATIVE PERCENT: 7 % (ref 2–12)
NEUTROPHILS ABSOLUTE: 5.96 E9/L (ref 1.8–7.3)
NEUTROPHILS RELATIVE PERCENT: 83.5 % (ref 43–80)
OVALOCYTES: ABNORMAL
PDW BLD-RTO: 13.2 FL (ref 11.5–15)
PLATELET # BLD: 202 E9/L (ref 130–450)
PMV BLD AUTO: 10.8 FL (ref 7–12)
POIKILOCYTES: ABNORMAL
POTASSIUM SERPL-SCNC: 3.2 MMOL/L (ref 3.5–5)
PROCALCITONIN: 0.06 NG/ML (ref 0–0.08)
PROTHROMBIN TIME: 14.4 SEC (ref 9.3–12.4)
RBC # BLD: 3.82 E12/L (ref 3.8–5.8)
SODIUM BLD-SCNC: 136 MMOL/L (ref 132–146)
TOTAL PROTEIN: 6.4 G/DL (ref 6.4–8.3)
WBC # BLD: 7.1 E9/L (ref 4.5–11.5)

## 2020-11-24 PROCEDURE — 85378 FIBRIN DEGRADE SEMIQUANT: CPT

## 2020-11-24 PROCEDURE — 80053 COMPREHEN METABOLIC PANEL: CPT

## 2020-11-24 PROCEDURE — 6370000000 HC RX 637 (ALT 250 FOR IP): Performed by: INTERNAL MEDICINE

## 2020-11-24 PROCEDURE — 86901 BLOOD TYPING SEROLOGIC RH(D): CPT

## 2020-11-24 PROCEDURE — 85730 THROMBOPLASTIN TIME PARTIAL: CPT

## 2020-11-24 PROCEDURE — 97161 PT EVAL LOW COMPLEX 20 MIN: CPT | Performed by: PHYSICAL THERAPIST

## 2020-11-24 PROCEDURE — 85384 FIBRINOGEN ACTIVITY: CPT

## 2020-11-24 PROCEDURE — 2500000003 HC RX 250 WO HCPCS: Performed by: INTERNAL MEDICINE

## 2020-11-24 PROCEDURE — 36415 COLL VENOUS BLD VENIPUNCTURE: CPT

## 2020-11-24 PROCEDURE — 2580000003 HC RX 258: Performed by: INTERNAL MEDICINE

## 2020-11-24 PROCEDURE — 82962 GLUCOSE BLOOD TEST: CPT

## 2020-11-24 PROCEDURE — 97530 THERAPEUTIC ACTIVITIES: CPT | Performed by: PHYSICAL THERAPIST

## 2020-11-24 PROCEDURE — 85025 COMPLETE CBC W/AUTO DIFF WBC: CPT

## 2020-11-24 PROCEDURE — 6360000002 HC RX W HCPCS: Performed by: INTERNAL MEDICINE

## 2020-11-24 PROCEDURE — 86900 BLOOD TYPING SEROLOGIC ABO: CPT

## 2020-11-24 PROCEDURE — 2580000003 HC RX 258: Performed by: SPECIALIST

## 2020-11-24 PROCEDURE — 97165 OT EVAL LOW COMPLEX 30 MIN: CPT

## 2020-11-24 PROCEDURE — 2700000000 HC OXYGEN THERAPY PER DAY

## 2020-11-24 PROCEDURE — 85610 PROTHROMBIN TIME: CPT

## 2020-11-24 PROCEDURE — 86850 RBC ANTIBODY SCREEN: CPT

## 2020-11-24 PROCEDURE — 97530 THERAPEUTIC ACTIVITIES: CPT

## 2020-11-24 PROCEDURE — 2060000000 HC ICU INTERMEDIATE R&B

## 2020-11-24 PROCEDURE — 94640 AIRWAY INHALATION TREATMENT: CPT

## 2020-11-24 RX ORDER — POTASSIUM CHLORIDE 7.45 MG/ML
10 INJECTION INTRAVENOUS PRN
Status: DISCONTINUED | OUTPATIENT
Start: 2020-11-24 | End: 2020-11-25 | Stop reason: HOSPADM

## 2020-11-24 RX ORDER — POTASSIUM CHLORIDE 20 MEQ/1
40 TABLET, EXTENDED RELEASE ORAL PRN
Status: DISCONTINUED | OUTPATIENT
Start: 2020-11-24 | End: 2020-11-25 | Stop reason: HOSPADM

## 2020-11-24 RX ADMIN — METOPROLOL SUCCINATE 100 MG: 50 TABLET, EXTENDED RELEASE ORAL at 22:10

## 2020-11-24 RX ADMIN — GUAIFENESIN 600 MG: 600 TABLET, EXTENDED RELEASE ORAL at 22:10

## 2020-11-24 RX ADMIN — BENZONATATE 100 MG: 100 CAPSULE ORAL at 12:39

## 2020-11-24 RX ADMIN — Medication 10 ML: at 22:11

## 2020-11-24 RX ADMIN — SODIUM CHLORIDE 20 ML: 9 INJECTION, SOLUTION INTRAVENOUS at 02:45

## 2020-11-24 RX ADMIN — IPRATROPIUM BROMIDE AND ALBUTEROL 1 PUFF: 20; 100 SPRAY, METERED RESPIRATORY (INHALATION) at 05:39

## 2020-11-24 RX ADMIN — Medication 10 ML: at 09:45

## 2020-11-24 RX ADMIN — INSULIN LISPRO 6 UNITS: 100 INJECTION, SOLUTION INTRAVENOUS; SUBCUTANEOUS at 09:46

## 2020-11-24 RX ADMIN — METOPROLOL SUCCINATE 100 MG: 50 TABLET, EXTENDED RELEASE ORAL at 09:37

## 2020-11-24 RX ADMIN — REMDESIVIR 100 MG: 100 INJECTION, POWDER, LYOPHILIZED, FOR SOLUTION INTRAVENOUS at 11:39

## 2020-11-24 RX ADMIN — Medication 400 UNITS: at 22:10

## 2020-11-24 RX ADMIN — Medication 500 MG: at 22:10

## 2020-11-24 RX ADMIN — INSULIN GLARGINE 15 UNITS: 100 INJECTION, SOLUTION SUBCUTANEOUS at 22:07

## 2020-11-24 RX ADMIN — INSULIN LISPRO 6 UNITS: 100 INJECTION, SOLUTION INTRAVENOUS; SUBCUTANEOUS at 12:48

## 2020-11-24 RX ADMIN — INSULIN LISPRO 5 UNITS: 100 INJECTION, SOLUTION INTRAVENOUS; SUBCUTANEOUS at 22:08

## 2020-11-24 RX ADMIN — INSULIN LISPRO 8 UNITS: 100 INJECTION, SOLUTION INTRAVENOUS; SUBCUTANEOUS at 18:26

## 2020-11-24 RX ADMIN — Medication 400 MG: at 09:37

## 2020-11-24 RX ADMIN — CLOPIDOGREL BISULFATE 75 MG: 75 TABLET ORAL at 09:37

## 2020-11-24 RX ADMIN — LOSARTAN POTASSIUM 100 MG: 50 TABLET, FILM COATED ORAL at 09:37

## 2020-11-24 RX ADMIN — ZINC SULFATE 220 MG (50 MG) CAPSULE 50 MG: CAPSULE at 09:36

## 2020-11-24 RX ADMIN — AMLODIPINE BESYLATE 5 MG: 5 TABLET ORAL at 22:10

## 2020-11-24 RX ADMIN — TAMSULOSIN HYDROCHLORIDE 0.4 MG: 0.4 CAPSULE ORAL at 22:10

## 2020-11-24 RX ADMIN — ROSUVASTATIN CALCIUM 40 MG: 10 TABLET, FILM COATED ORAL at 11:39

## 2020-11-24 RX ADMIN — INSULIN GLARGINE 15 UNITS: 100 INJECTION, SOLUTION SUBCUTANEOUS at 09:47

## 2020-11-24 RX ADMIN — ASPIRIN 81 MG CHEWABLE TABLET 81 MG: 81 TABLET CHEWABLE at 09:37

## 2020-11-24 RX ADMIN — IPRATROPIUM BROMIDE AND ALBUTEROL 1 PUFF: 20; 100 SPRAY, METERED RESPIRATORY (INHALATION) at 09:16

## 2020-11-24 RX ADMIN — ENOXAPARIN SODIUM 40 MG: 40 INJECTION SUBCUTANEOUS at 09:45

## 2020-11-24 RX ADMIN — POTASSIUM CHLORIDE 40 MEQ: 20 TABLET, EXTENDED RELEASE ORAL at 14:38

## 2020-11-24 RX ADMIN — IPRATROPIUM BROMIDE AND ALBUTEROL 1 PUFF: 20; 100 SPRAY, METERED RESPIRATORY (INHALATION) at 16:27

## 2020-11-24 RX ADMIN — ENOXAPARIN SODIUM 40 MG: 40 INJECTION SUBCUTANEOUS at 22:09

## 2020-11-24 RX ADMIN — DEXAMETHASONE 6 MG: 4 TABLET ORAL at 09:38

## 2020-11-24 RX ADMIN — DULOXETINE HYDROCHLORIDE 60 MG: 60 CAPSULE, DELAYED RELEASE ORAL at 22:10

## 2020-11-24 RX ADMIN — AMLODIPINE BESYLATE 5 MG: 5 TABLET ORAL at 09:43

## 2020-11-24 RX ADMIN — Medication 400 UNITS: at 09:37

## 2020-11-24 RX ADMIN — IPRATROPIUM BROMIDE AND ALBUTEROL 1 PUFF: 20; 100 SPRAY, METERED RESPIRATORY (INHALATION) at 23:02

## 2020-11-24 RX ADMIN — PANTOPRAZOLE SODIUM 40 MG: 40 TABLET, DELAYED RELEASE ORAL at 06:46

## 2020-11-24 RX ADMIN — GUAIFENESIN 600 MG: 600 TABLET, EXTENDED RELEASE ORAL at 09:37

## 2020-11-24 ASSESSMENT — PAIN SCALES - GENERAL
PAINLEVEL_OUTOF10: 0
PAINLEVEL_OUTOF10: 0

## 2020-11-24 NOTE — PROGRESS NOTES
Internal Medicine Progress Note    [facility]    Joline Angelucci. Mando Portillo.MILENAOALYSSA Mukherjee D.O., HILDA Griggs, MSN, APRN, NP-C  Stew Garland. Eugene Allan, MSN, APRN-CNP     Primary Care Physician: Soraya Hunter DO   Admitting Physician:  Aakash Sanders DO  Admission date and time: 11/19/2020  3:59 PM    Room:  [address] [place] [address]  Admitting diagnosis: Acute respiratory failure with hypoxia [facility]) [J96.01]    Patient Name: Millicent Bardales  MRN: [id number]    Date of Service: 11/24/2020     Subjective:  Michael Brody is a 69 y.o. male who was seen and examined today,11/24/2020, at the bedside. Michael Brody is feeling better after receiving convalescent plasma. He remains on 6 L. He did not work with therapy teams yesterday and we have discussed the need to take the impetus to be more active on his own. We discussed more aggressive use of incentive spirometry and flutter valve, proning and repositioning. Maximal Covid protocol is being undertaken. Review of System:   Constitutional:. Admits to improving generalized malaise and fatigue. HEENT:   Denies ear pain, sore throat, sinus or eye problems. Cardiovascular:   Denies any chest pain, irregular heartbeats, or palpitations. Respiratory:   Shortness of breath persists but is improving to some degree. Gastrointestinal:   No current abdominal pain. No diarrhea or constipation. Genitourinary:    Denies any urgency, frequency, hematuria. Voiding without difficulty. Extremities:   Denies lower extremity swelling, edema or cyanosis. Neurology:    Denies any headache or focal neurological deficits, admits to generalized weakness and deconditioning. Psch:   Denies being anxious or depressed. Musculoskeletal:    Denies  myalgias, joint complaints or back pain. Integumentary:   Denies any rashes, ulcers, or excoriations. Denies bruising.   Hematologic/Lymphatic:  Denies bruising or bleeding. Physical Exam:  No intake/output data recorded. Intake/Output Summary (Last 24 hours) at 11/24/2020 0814  Last data filed at 11/23/2020 2151  Gross per 24 hour   Intake 450 ml   Output --   Net 450 ml   I/O last 3 completed shifts: In: 450 [P.O.:440; I.V.:10]  Out: -   Patient Vitals for the past 96 hrs (Last 3 readings):   Weight   11/22/20 0700 260 lb (117.9 kg)   11/20/20 1122 256 lb 14.4 oz (116.5 kg)     Vital Signs:   Blood pressure (!) 140/82, pulse 71, temperature 98.6 °F (37 °C), resp. rate 20, height 6' 1\" (1.854 m), weight 260 lb (117.9 kg), SpO2 96 %. To prevent transmission of COVID-19 and also the need to preserve PPE, a physical examination of the patient was not directly performed but discussed with the nursing staff. She was evaluated at the doorway, symptoms were directly discussed, test results were reviewed and all questions were answered. Was discussed extensively with nursing staff and consultants.     Medication:  Scheduled Meds:   insulin glargine  15 Units Subcutaneous BID    remdesivir IVPB  100 mg Intravenous Q24H    insulin lispro  0-12 Units Subcutaneous TID WC    insulin lispro  0-6 Units Subcutaneous Nightly    metoprolol succinate  100 mg Oral BID    DULoxetine  60 mg Oral Nightly    tamsulosin  0.4 mg Oral Nightly    vitamin E  400 Units Oral BID    niacin  500 mg Oral Nightly    zinc sulfate  50 mg Oral Daily    guaiFENesin  600 mg Oral BID    vitamin D  50,000 Units Oral Weekly    aspirin  81 mg Oral Daily    amLODIPine  5 mg Oral BID    clopidogrel  75 mg Oral Daily    losartan  100 mg Oral Daily    magnesium oxide  400 mg Oral Daily    pantoprazole  40 mg Oral QAM AC    sodium chloride flush  10 mL Intravenous 2 times per day    enoxaparin  40 mg Subcutaneous BID    albuterol-ipratropium  1 puff Inhalation Q6H    dexamethasone  6 mg Oral Daily    rosuvastatin  40 mg Oral Daily     Continuous Infusions:   dextrose         Objective Data:  CBC with Differential:    Lab Results   Component Value Date    WBC 8.7 11/23/2020    RBC 3.88 11/23/2020    HGB 12.0 11/23/2020    HCT 35.7 11/23/2020     11/23/2020    MCV 92.0 11/23/2020    MCH 30.9 11/23/2020    MCHC 33.6 11/23/2020    RDW 13.3 11/23/2020    SEGSPCT 76 03/11/2014    LYMPHOPCT 8.3 11/23/2020    MONOPCT 6.2 11/23/2020    BASOPCT 0.1 11/23/2020    MONOSABS 0.54 11/23/2020    LYMPHSABS 0.72 11/23/2020    EOSABS 0.00 11/23/2020    BASOSABS 0.01 11/23/2020     CMP:    Lab Results   Component Value Date     11/23/2020    K 3.6 11/23/2020     11/23/2020    CO2 24 11/23/2020    BUN 16 11/23/2020    CREATININE 0.7 11/23/2020    GFRAA >60 11/23/2020    LABGLOM >60 11/23/2020    GLUCOSE 191 11/23/2020    PROT 6.4 11/23/2020    LABALBU 2.8 11/23/2020    CALCIUM 8.2 11/23/2020    BILITOT 0.5 11/23/2020    ALKPHOS 78 11/23/2020    AST 35 11/23/2020    ALT 30 11/23/2020       Assessment:  1. Sepsis (POA) secondary to Covid 19 infection resulting in acute respiratory failure with hypoxia  2. Coronary artery disease status post PCI  3. Chronic, compensated, combined systolic/diastolic congestive heart failure  4. Insulin-dependent diabetes mellitus type 2  5. History of neuromuscular disorder  6. Essential hypertension  7. Gastroesophageal reflux disease    Plan:   Trell Paul appears significantly better from a clinical standpoint. We will continue with nasal cannula oxygen weaning. We will continue with remdesivir and maximal Covid protocol is being undertaken including corticosteroids and vitamin supplementation. Convalescent plasma was given as well. He remains on 6 L and has not worked with therapy in 24 hours or more. We have discussed the need to take the impetus to be more active on his own, incentive spirometer use and flutter valve. Chronic comorbidities are otherwise well controlled.   Nursing reports poor motivation and we discussed the absolute need to become more ambulatory today. We also encourage the use of the incentive spirometer. More than 50% of my  time was spent at the bedside counseling/coordinating care with the patient and/or family with face to face contact. This time was spent reviewing notes and laboratory data as well as instructing and counseling the patient. Time I spent with the family or surrogate(s) is included only if the patient was incapable of providing the necessary information or participating in medical decisions. I also discussed the differential diagnosis and all of the proposed management plans with the patient and individuals accompanying the patient. Amanda Phil requires this high level of physician care and nursing on the [facility] unit due the complexity of decision management and chance of rapid decline or death. Continued cardiac monitoring and higher level of nursing are required. I am readily available for any further decision-making and intervention.      PEPE Silva  11/24/2020  8:14 AM

## 2020-11-24 NOTE — PROGRESS NOTES
Past Surgical History:   Procedure Laterality Date    CARDIAC CATHETERIZATION  3 11 14    Mildl to moderate CAD. Normal left venricular size and systolic functiion. Systemic hypertension. [facility]    COLONOSCOPY      CORONARY ANGIOPLASTY  10/2015    Dr. Deshawn Hylton MID/DISTAL CX. PCI of the mid and distal dx artery with [place] ZACARIAS with MATT grade III flow    DIAGNOSTIC CARDIAC CATH LAB PROCEDURE  02/15/2016    PATENT STENT IN CX, LAD 30%, RCA 40% AT [facility]. EF 60% EF    ENDOSCOPY, COLON, DIAGNOSTIC      GALLBLADDER SURGERY  1982    In [address] Right     SHOULDER ARTHROSCOPY      SKIN CANCER EXCISION  01/2020    x2-ear-Dr. Marjan Gonzales    TUMOR REMOVAL  bladder tumor    UPPER GASTROINTESTINAL ENDOSCOPY N/A 8/7/2020    EGD BIOPSY performed by Quentin Durant DO at [facility] ENDOSCOPY       Precautions: Up as tolerated, falls, alarm, Droplet plus and COVID-19 ,  deaf right    SUBJECTIVE:    Social history: Patient lives with spouse   in a ranch home  with 4 steps  to enter with Rail  Tub shower     Equipment owned: [employer],       [address]   How much difficulty turning over in bed?: A Little  How much difficulty sitting down on / standing up from a chair with arms?: A Little  How much difficulty moving from lying on back to sitting on side of bed?: A Little  How much help from another person moving to and from a bed to a chair?: A Little  How much help from another person needed to walk in hospital room?: A Little  How much help from another person for climbing 3-5 steps with a railing?: A Lot  AM-PAC Inpatient Mobility Raw Score : 17  AM-PAC Inpatient T-Scale Score : 42.13  Mobility Inpatient CMS 0-100% Score: 50.57  Mobility Inpatient CMS G-Code Modifier : CK    Nursing cleared patient for PT evaluation.  The admitting diagnosis and active problem list as listed above have been reviewed prior to the initiation of this evaluation. OBJECTIVE;   Initial Evaluation  Date: 11/24/2020 Treatment Date:     Short Term/ Long Term   Goals   Was pt agreeable to Eval/treatment? Yes    To be met in 5 days   Pain level   0/10        Bed Mobility    Rolling: Not assessed     Supine to sit: Minimal assist of 1    Sit to supine: Minimal assist of 1    Scooting: Minimal assist of 1    Rolling: Independent    Supine to sit:  Independent    Sit to supine: Independent    Scooting: Independent     Transfers Sit to stand: Minimal assist of 1 Cues for hand placement and safety   Sit to stand: Modified Independent     Ambulation    2 x 10 feet using  wheeled walker with Minimal assist of 1   increased shortness of breath decreased O2 sat limiting distance    2 x 50 feet using  wheeled walker with Modified Independent    Stair negotiation: ascended and descended   Not assessed       4 steps  1 rail with supervision    ROM Within functional limits        Strength BUE:  refer to OT eval  RLE:  3+/5  LLE:  3+/5   Increase strength in affected mm groups by 1/3 grade   Balance Sitting EOB:  good -  Dynamic Standing:  fair wheeled walker   Sitting EOB:  good    Dynamic Standing: good wheeled walker      Patient is Alert & Oriented x person, place, time and situation and follows directions    Sensation:  Patient  denies numbness and tingling     Edema:  none noted    Endurance: poor      Vitals:  6 liters  Blood Pressure at rest   Blood Pressure during session     Heart Rate at rest 64 Heart Rate during session 72   SPO2 at rest 92%  SPO2 during session 87-91% multiple rest breaks with cues for purse lip breathing       Patient education  Patient educated on role of Physical Therapy, risks of immobility, safety and plan of care, purse lip breathing, energy conservation and importance of positional changes for oxygen exchange      Patient response to education:   Pt verbalized understanding Pt demonstrated skill Pt requires further education in this area   Yes Partial Yes      Treatment:  Patient practiced and was instructed/facilitated in the following treatment: Patient assisted to [facility]  Sat edge of bed 20 minutes with Supervision  to increase dynamic sitting balance and activity tolerance. ambulated in room, O2 saturation decreased with increased shortness of breath and coughing. Standing rest break, ambulated back to bed. Returned seated additional 5 minutes. Instruction in use of spirometer, increased cough with use, educated proper technique. Returned to bed. Therapeutic Exercises:  ankle pumps  x 10 reps. At end of session, patient in bed with alarm call light and phone within reach,   all lines and tubes intact, nursing notified. Patient would benefit from continued skilled Physical Therapy to improve functional independence and quality of life. Patient's/ family goals   home        ASSESSMENT: Patient exhibits decreased strength, balance, coordination impairing functional mobility. Impaired endurance, decreased O2 saturation with minimal activity limiting gait distance and function.        Plan of Care:     -Bed Mobility: Lower extremity exercises   -Sitting Balance: Incorporate reaching activities to activate trunk muscles   -Standing Balance: Perform strengthening exercises in standing to promote motor control with or without upper extremity support   -Transfers: Provide instruction on proper hand and foot position for adequate transfer of weight onto lower extremities and use of gait device  -Gait: Gait training and Standing activities to improve: base of support, weight shift, weight bearing    -Endurance: Utilize Supervised activities to increase level of endurance to allow for safe functional mobility including transfers and gait  and Use graduated activities to promote good breathing techniques and provide support and education to maximize respiratory function  -Stairs: Stair training with instruction on proper technique and hand placement on rail    Patient and or family understand(s) diagnosis, prognosis, and plan of care. Frequency of treatments: Patient will be seen    daily. Time in  1000  Time out  1030    Total Treatment Time  8 minutes    Evaluation time includes thorough review of current medical information, gathering information on past medical history/social history and prior level of function, completion of standardized testing/informal observation of tasks, assessment of data, and development of Plan of care and goals.      CPT codes:  Low Complexity PT evaluation (99765)  Therapeutic activities (39504)   8 minutes  1 unit(s)    Edda Zazueta, PT

## 2020-11-24 NOTE — PLAN OF CARE
Nutrition Problem #1: Predicted inadequate energy intake  Intervention: Food and/or Nutrient Delivery: Continue Current Diet  Nutritional Goals: PO consistently % of meals. No weight loss.

## 2020-11-24 NOTE — PLAN OF CARE
Problem: Airway Clearance - Ineffective  Goal: Achieve or maintain patent airway  Outcome: Met This Shift     Problem: Gas Exchange - Impaired  Goal: Absence of hypoxia  Outcome: Met This Shift     Problem: Gas Exchange - Impaired  Goal: Promote optimal lung function  Outcome: Met This Shift     Problem: Breathing Pattern - Ineffective  Goal: Ability to achieve and maintain a regular respiratory rate  Outcome: Met This Shift     Problem: Body Temperature -  Risk of, Imbalanced  Goal: Ability to maintain a body temperature within defined limits  Outcome: Met This Shift     Problem: Body Temperature -  Risk of, Imbalanced  Goal: Will regain or maintain usual level of consciousness  Outcome: Met This Shift     Problem:  Body Temperature -  Risk of, Imbalanced  Goal: Complications related to the disease process, condition or treatment will be avoided or minimized  Outcome: Met This Shift     Problem: Isolation Precautions - Risk of Spread of Infection  Goal: Prevent transmission of infection  Outcome: Met This Shift     Problem: Nutrition Deficits  Goal: Optimize nutrtional status  Outcome: Met This Shift     Problem: Risk for Fluid Volume Deficit  Goal: Maintain normal heart rhythm  Outcome: Met This Shift     Problem: Risk for Fluid Volume Deficit  Goal: Maintain absence of muscle cramping  Outcome: Met This Shift     Problem: Risk for Fluid Volume Deficit  Goal: Maintain normal serum potassium, sodium, calcium, phosphorus, and pH  Outcome: Met This Shift     Problem: Loneliness or Risk for Loneliness  Goal: Demonstrate positive use of time alone when socialization is not possible  Outcome: Met This Shift     Problem: Fatigue  Goal: Verbalize increase energy and improved vitality  Outcome: Met This Shift     Problem: Falls - Risk of:  Goal: Will remain free from falls  Description: Will remain free from falls  Outcome: Met This Shift     Problem: Falls - Risk of:  Goal: Absence of physical injury  Description: Absence of physical injury  Outcome: Met This Shift

## 2020-11-24 NOTE — PROGRESS NOTES
Nightly  zinc sulfate (ZINCATE) capsule 50 mg, Daily  guaiFENesin (MUCINEX) extended release tablet 600 mg, BID  vitamin D (ERGOCALCIFEROL) capsule 50,000 Units, Weekly  polyvinyl alcohol (LIQUIFILM TEARS) 1.4 % ophthalmic solution 1 drop, PRN  loperamide (IMODIUM) capsule 2 mg, 4x Daily PRN  aspirin chewable tablet 81 mg, Daily  amLODIPine (NORVASC) tablet 5 mg, BID  clopidogrel (PLAVIX) tablet 75 mg, Daily  losartan (COZAAR) tablet 100 mg, Daily  magnesium oxide (MAG-OX) tablet 400 mg, Daily  pantoprazole (PROTONIX) tablet 40 mg, QAM AC  sodium chloride flush 0.9 % injection 10 mL, 2 times per day  sodium chloride flush 0.9 % injection 10 mL, PRN  acetaminophen (TYLENOL) tablet 650 mg, Q6H PRN    Or  acetaminophen (TYLENOL) suppository 650 mg, Q6H PRN  polyethylene glycol (GLYCOLAX) packet 17 g, Daily PRN  glucose (GLUTOSE) 40 % oral gel 15 g, PRN  dextrose 50 % IV solution, PRN  glucagon (rDNA) injection 1 mg, PRN  dextrose 5 % solution, PRN  enoxaparin (LOVENOX) injection 40 mg, BID  benzonatate (TESSALON) capsule 100 mg, TID PRN  albuterol-ipratropium (COMBIVENT RESPIMAT)  MCG/ACT inhaler 1 puff, Q6H  dexamethasone (DECADRON) tablet 6 mg, Daily  rosuvastatin (CRESTOR) tablet 40 mg, Daily          Data:  Lab Results   Component Value Date    COVID19 DETECTED 11/19/2020    COVID19 Not Detected 08/05/2020     COVID-19/SARS-COV-2 LABS  Recent Labs     11/22/20  0736 11/23/20  0656 11/24/20  1058   CRP  --  5.2*  --    FERRITIN  --  373  --    LDH  --  259*  --    TROPONINI  --  <0.01  --    DDIMER 347 430 369   FIBRINOGEN >700* >700* 656*   INR 1.3 1.3 1.3   PROTIME 14.7* 15.0* 14.4*   AST 38 35 31   ALT 25 30 37   TRIG  --  74  --      Lab Results   Component Value Date    CHOL 64 11/23/2020    TRIG 74 11/23/2020    HDL 26 11/23/2020    LDLCALC 23 11/23/2020    LABVLDL 15 11/23/2020     Lab Results   Component Value Date/Time    VITD25 20 (L) 11/23/2020 06:56 AM     Recent Labs     11/22/20  0736 11/23/20  0656 11/24/20  1058   WBC 9.7 8.7 7.1   HGB 11.9* 12.0* 11.9*   HCT 35.5* 35.7* 34.4*    180 202   MCV 93.2 92.0 90.1   MCH 31.2 30.9 31.2   MCHC 33.5 33.6 34.6*   RDW 13.5 13.3 13.2   LYMPHOPCT 6.9* 8.3*  --    MONOPCT 5.7 6.2  --    BASOPCT 0.0 0.1  --    MONOSABS 0.55 0.54  --    LYMPHSABS 0.67* 0.72*  --    EOSABS 0.00* 0.00*  --    BASOSABS 0.00 0.01  --      Recent Labs     11/22/20  0736 11/23/20  0656 11/24/20  1058    137 136   K 3.4* 3.6 3.2*    102 100   CO2 26 24 24   BUN 19 16 16   CREATININE 0.8 0.7 0.7   GFRAA >60 >60 >60   LABGLOM >60 >60 >60   GLUCOSE 100* 191* 333*   PROT 6.7 6.4 6.4   LABALBU 3.0* 2.8* 2.8*   CALCIUM 8.3* 8.2* 8.4*   BILITOT 0.4 0.5 0.5   ALKPHOS 84 78 87   AST 38 35 31   ALT 25 30 37     U/A:    Lab Results   Component Value Date    COLORU DKYELLOW 10/02/2014    PROTEINU TRACE 10/02/2014    PHUR 6.5 10/02/2014    WBCUA NONE 10/02/2014    RBCUA NONE 10/02/2014    BACTERIA NONE 10/02/2014    CLARITYU Clear 10/02/2014    SPECGRAV 1.025 10/02/2014    LEUKOCYTESUR Negative 10/02/2014    UROBILINOGEN 0.2 10/02/2014    BILIRUBINUR SMALL 10/02/2014    BLOODU TRACE 10/02/2014    GLUCOSEU Negative 10/02/2014        MICRO  Blood cultures   Blood Culture, Routine   Date Value Ref Range Status   11/19/2020 24 Hours no growth  Preliminary       ASSESSMENT:    Active Hospital Problems    Diagnosis Date Noted    COVID-19 [U07.1] 11/20/2020    Acute respiratory failure with hypoxia (HCC) [J96.01] 11/19/2020     SARS-COV-2- positive with pneumonia  Leukopenia resolved  Fevers  [id number]  · Remdesivir day # 4  · DECADRON 6MG QDAY     · Vitamins : vitamin C, thiamine, zinc, vitamin D  · Discussed proning, side to side positions  · Is   · Inc activity   · crp- 5.2  Sed rate- 88  · Received convalescent plasma on 11/23/2020- no issues     Follow COVID-19/SARS-COV-2- BIOMARKERS     Baseline triglyceride/LIPID PANEL   DVT prophylaxis/TREATMENT    PLAN: CONTINUE CURRENT MEDICATIONS AND SUPPORTIVE CARE. Ying Reyes CNS- [facility]  11/24/2020  As above    This is a face to face encounter with Sanchez Madera on 11/24/20. I have performed and participated in the history, exam, medical decision making, and  POC  with the NURSE PRACTITIONER and provided the instruction and education regarding this patient's care. Imaging and labs were reviewed per medical records and any ID pertinent labs were addressed with the patient. The patient was educated about the diagnosis, prognosis, indications, risks and benefits of treatment. PT WALKED TO THE BR AND HAD HIS O2 OFF  HIS WAS SAT 90% ON RA  PLACED BACK ON 2L  HE SEEMED A LITTLE WINDED BUT HAS NO OTHER C/O  S/P CONVALESCENT PLASMA  CONT TO WEAN O2  WATCH BLOOD SUGAR  BIOMARKERS    Pt had the opportunity to ask questions. All questions were answered. Thank you for involving me in the care of Sanchez Madera. Please do not hesitate to call for any questions or concerns.     Electronically signed by Wale Vega MD on 11/24/2020 at 3:38 PM    Phone [phone]  Fax [phone]

## 2020-11-24 NOTE — CARE COORDINATION
SS NOTE: COVID POSITIVE 11/19/20. Pt continues to plan on returning home at [facility]. He does not want HHC. If home O2 is needed pt has no preference for a provider. SW / CM will need O2 order and O2 SAT testing in [facility]. Florin Fitzgerald. 11/24/2020.11:02 AM.

## 2020-11-24 NOTE — PROGRESS NOTES
Occupational Therapy  OCCUPATIONAL THERAPY INITIAL EVALUATION      Date:2020  Patient Name: Sharon Jones  MRN: [id number]  : 1950  Room: [address] [place] [address]    Referring Provider: Quincy Lefort, DO     Evaluating OT: Kasey Borjas OTR/L #[id number]    AM-PAC Daily Activity Raw Score:     Recommended Placement: Subacute vs. Home  Recommended Adaptive Equipment: TBD     Diagnosis:   1. Suspected 2019 novel coronavirus infection        Surgery: N/A      Pertinent Medical History:    Past Medical History:   Diagnosis Date    Arthritis     CAD (coronary artery disease) 3/20/2014    Cancer ([facility]     skin-x2 (ear-Dr. Browne Nails)    Chronic fatigue fibromyalgia syndrome     Deafness in right ear     Diabetes mellitus ([facility]     type 2    GERD (gastroesophageal reflux disease)     Hypertension     Morbid obesity ([facility]     Neuromuscular disorder ([facility]     [facility]    patient was [facility] to [facility] due to aversion from [facility]      Precautions:  Falls, alarm ,high flow O2, covid+, droplet plus     Home Living: Pt lives with wife in a 1 story home with 4 RADHA with 1 rail(s).   Bathroom setup: tub/shower   Equipment owned: cane    Prior Level of Function: Independent with ADLs , Independent with IADLs; ambulated with cane PRN  Driving: Yes  Occupation: Retired     Pain Level: -/10  Cognition: A&O: 4/4; Follows 2 step directions   Memory:  Good   Sequencing:  Fair+   Problem solving:  Fair+   Judgement/safety:  Fair+     Functional Assessment:   Initial Eval Status  Date: 20 Treatment Status  Date: STGs = LTGs  Time frame: 5-7 days   Feeding Independent        Grooming Supervision   seated EOB  Independent    UB Dressing Minimal Assist   Seated EOB, assist with typing gown around back  Independent    LB Dressing Moderate Assist   Don socks at bed level  Independent    Bathing Moderate Assist    Modified [place]    Toileting Minimal Assist     Modified [place]    Bed Mobility  Supine to sit: Minimal Assist   Sit to supine: Minimal Assist     Supine to sit: Independent   Sit to supine: Independent    Functional Transfers Minimal Assist   Cuing on hand placement, body mechanics. Sit<>stand from bed  Independent    Functional Mobility Minimal Assist   Using [employer] at bed side  Pt desating with ambulation. Max cuing on PLB, body mechanics and posture. Modified [place]    Balance Sitting:     Static:  Fair+    Dynamic:fair+  Standing: fair+  Sitting:     Static:  good    Dynamic:good  Standing: good   Activity Tolerance Fair  Spo2 high 80s with activity, cuing on PLB throughout session. SpO2 90-92% at rest. 95% at end of session. Fair+   Visual/  Perceptual Glasses: Yes   WFL       Hand Dominance Right     Strength ROM Additional Info:    RUE  4/5  WFL good  and wfl FMC/dexterity noted during ADL tasks       LUE 4/5  WFL good  and wfl FMC/dexterity noted during ADL tasks       Hearing:Deaf R ear  Sensation:  No c/o numbness or tingling   Tone: WFL   Edema: None noted    Comments:   Nursing approved therapy session. Upon arrival, patient supine in bed and agreeable to OT session. Therapist educated pt on role of OT. At end of session, patient supine in bed with call light and phone within reach, alarm on, all lines and tubes intact; nursing aware. Pt required cuing on PLB throughout session. Pt demonstrated fair+ understanding of education/techniques and decreased independence and safety during completion of ADL/functional transfer/mobility tasks. Pt would benefit from continued skilled OT to increase safety and independence with completion of ADL/IADL tasks for functional independence and quality of life. Treatment:   Skilled occupational therapy services provided include instruction/training on safety and adapted techniques for completion of therapeutic activities, ADLs/IADLs. Skilled monitoring of O2 sats, HR, and pt response throughout treatment.    Therapist facilitated therapeutic activities: bed mobility, functional transfers, graded functional activities (static/dynamic sitting, static/dynamic standing, functional reaching), and functional ambulation - providing min cuing (verbal, visual, tactile) on AD management, hand placement, body mechanics, posture, breathing techniques, energy conservation, compensatory strategies, and safety.  Therapist facilitated self-care retraining: UB dressing, LB dressing, grooming tasks - providing min cuing (verbal, visual, tactile) on body mechanics, posture, breathing techniques, energy conservation, compensatory strategies, and safety. Therapist educated pt on compensatory strategies/energy conservation techniques to safely complete ADLs/IADLs.      Eval Complexity: Low    Assessment of current deficits   Functional mobility [x]  ADLs [x] Strength [x]  Cognition []  Functional transfers  [x] IADLs [x] Safety Awareness [x]  Endurance [x]  Fine Motor Coordination [] Balance [x] Vision/perception [] Sensation []   Gross Motor Coordination [] ROM [] Delirium []                  Motor Control []    Plan of Care: 1-3 days/week for 1-2 weeks PRN   [x]ADL retraining/adapted techniques and AE recommendations to increase functional independence within precautions                    [x]Energy conservation techniques to improve tolerance for selfcare routine   [x]Functional transfer/mobility training/DME recommendations for increased independence, safety and fall prevention         [x]Patient/family education to increase safety and functional independence             [x]Environmental modifications for safe mobility and completion of ADLs                             []Cognitive retraining ex's to improve problem solving skills & safe participation in ADLs/IADLs     []Sensory re-education techniques to improve extremity awareness, maintain skin integrity and improve hand function                             []Visual/Perceptual retraining  to improve body awareness and safety during transfers and ADLs  []Splinting/positioning needs to maintain joint/skin integrity and prevent contractures  [x]Therapeutic activity to improve functional performance during ADLs. [x]Therapeutic exercise to improve tolerance and functional strength for ADLs   [x]Balance retraining/tolerance tasks for facilitation of postural control with dynamic challenges during ADLs . [x]Neuromuscular re-education: facilitation of righting/equilibrium reactions, midline orientation, scapular stability/mobility, Normalization muscle tone and facilitation active functional movement/Attention                         []Delirium prevention/treatment     []Positioning to improve functional independence  []Other:     Rehab Potential:  Good for established goals     Patient / Family Goal: Not reported      Patient and/or family were instructed on functional diagnosis, prognosis/goals and OT plan of care. Demonstrated fair+ understanding. Eval Complexity: Low      Time In: 1001  Time Out: 1030  Total Treatment Time: 10    Min Units   OT Eval Low 97165  X  1   OT Eval Medium 23751      OT Eval High 59639       OT Re-Eval [id number]       Therapeutic Ex 45602       Therapeutic Activities 47820  8 1    ADL/Self Care 16747  2     Orthotic Management 83382       Neuro Re-Ed 94015       Non-Billable Time          Evaluation Time includes thorough review of current medical information, gathering information on past medical history/social history and prior level of function, completion of standardized testing/informal observation of tasks, assessment of data and education on plan of care and goals.     John Brennan, OTR/L #[id number]

## 2020-11-25 VITALS
TEMPERATURE: 98 F | BODY MASS INDEX: 34.46 KG/M2 | HEIGHT: 73 IN | DIASTOLIC BLOOD PRESSURE: 62 MMHG | RESPIRATION RATE: 18 BRPM | HEART RATE: 75 BPM | OXYGEN SATURATION: 91 % | WEIGHT: 260 LBS | SYSTOLIC BLOOD PRESSURE: 120 MMHG

## 2020-11-25 LAB
ALBUMIN SERPL-MCNC: 2.9 G/DL (ref 3.5–5.2)
ALP BLD-CCNC: 86 U/L (ref 40–129)
ALT SERPL-CCNC: 44 U/L (ref 0–40)
ANION GAP SERPL CALCULATED.3IONS-SCNC: 13 MMOL/L (ref 7–16)
APTT: 29.4 SEC (ref 24.5–35.1)
AST SERPL-CCNC: 31 U/L (ref 0–39)
BASOPHILS ABSOLUTE: 0 E9/L (ref 0–0.2)
BASOPHILS RELATIVE PERCENT: 0.1 % (ref 0–2)
BILIRUB SERPL-MCNC: 0.6 MG/DL (ref 0–1.2)
BUN BLDV-MCNC: 17 MG/DL (ref 8–23)
CALCIUM SERPL-MCNC: 8.5 MG/DL (ref 8.6–10.2)
CHLORIDE BLD-SCNC: 101 MMOL/L (ref 98–107)
CO2: 24 MMOL/L (ref 22–29)
CREAT SERPL-MCNC: 0.6 MG/DL (ref 0.7–1.2)
D DIMER: 319 NG/ML DDU
EOSINOPHILS ABSOLUTE: 0 E9/L (ref 0.05–0.5)
EOSINOPHILS RELATIVE PERCENT: 0 % (ref 0–6)
FIBRINOGEN: 618 MG/DL (ref 225–540)
GFR AFRICAN AMERICAN: >60
GFR NON-AFRICAN AMERICAN: >60 ML/MIN/1.73
GLUCOSE BLD-MCNC: 240 MG/DL (ref 74–99)
HCT VFR BLD CALC: 39.2 % (ref 37–54)
HEMOGLOBIN: 12.8 G/DL (ref 12.5–16.5)
INR BLD: 1.2
LYMPHOCYTES ABSOLUTE: 0.66 E9/L (ref 1.5–4)
LYMPHOCYTES RELATIVE PERCENT: 8 % (ref 20–42)
MCH RBC QN AUTO: 30.3 PG (ref 26–35)
MCHC RBC AUTO-ENTMCNC: 32.7 % (ref 32–34.5)
MCV RBC AUTO: 92.9 FL (ref 80–99.9)
METAMYELOCYTES RELATIVE PERCENT: 0.9 % (ref 0–1)
METER GLUCOSE: 214 MG/DL (ref 74–99)
METER GLUCOSE: 231 MG/DL (ref 74–99)
METER GLUCOSE: 257 MG/DL (ref 74–99)
MONOCYTES ABSOLUTE: 0.49 E9/L (ref 0.1–0.95)
MONOCYTES RELATIVE PERCENT: 6.2 % (ref 2–12)
NEUTROPHILS ABSOLUTE: 7.05 E9/L (ref 1.8–7.3)
NEUTROPHILS RELATIVE PERCENT: 85 % (ref 43–80)
OVALOCYTES: ABNORMAL
PDW BLD-RTO: 13.1 FL (ref 11.5–15)
PLATELET # BLD: 246 E9/L (ref 130–450)
PMV BLD AUTO: 11.2 FL (ref 7–12)
POIKILOCYTES: ABNORMAL
POTASSIUM SERPL-SCNC: 3.6 MMOL/L (ref 3.5–5)
PROTHROMBIN TIME: 13.4 SEC (ref 9.3–12.4)
RBC # BLD: 4.22 E12/L (ref 3.8–5.8)
SODIUM BLD-SCNC: 138 MMOL/L (ref 132–146)
TOTAL PROTEIN: 6.9 G/DL (ref 6.4–8.3)
WBC # BLD: 8.2 E9/L (ref 4.5–11.5)

## 2020-11-25 PROCEDURE — 2700000000 HC OXYGEN THERAPY PER DAY

## 2020-11-25 PROCEDURE — 6370000000 HC RX 637 (ALT 250 FOR IP): Performed by: INTERNAL MEDICINE

## 2020-11-25 PROCEDURE — 2580000003 HC RX 258: Performed by: INTERNAL MEDICINE

## 2020-11-25 PROCEDURE — 6360000002 HC RX W HCPCS: Performed by: INTERNAL MEDICINE

## 2020-11-25 PROCEDURE — 85730 THROMBOPLASTIN TIME PARTIAL: CPT

## 2020-11-25 PROCEDURE — 97530 THERAPEUTIC ACTIVITIES: CPT

## 2020-11-25 PROCEDURE — 6370000000 HC RX 637 (ALT 250 FOR IP): Performed by: SPECIALIST

## 2020-11-25 PROCEDURE — 85384 FIBRINOGEN ACTIVITY: CPT

## 2020-11-25 PROCEDURE — 85025 COMPLETE CBC W/AUTO DIFF WBC: CPT

## 2020-11-25 PROCEDURE — 82962 GLUCOSE BLOOD TEST: CPT

## 2020-11-25 PROCEDURE — 85610 PROTHROMBIN TIME: CPT

## 2020-11-25 PROCEDURE — 85378 FIBRIN DEGRADE SEMIQUANT: CPT

## 2020-11-25 PROCEDURE — 36415 COLL VENOUS BLD VENIPUNCTURE: CPT

## 2020-11-25 PROCEDURE — 80053 COMPREHEN METABOLIC PANEL: CPT

## 2020-11-25 PROCEDURE — 2500000003 HC RX 250 WO HCPCS: Performed by: INTERNAL MEDICINE

## 2020-11-25 RX ORDER — MULTIVIT WITH MINERALS/LUTEIN
1000 TABLET ORAL DAILY
Qty: 30 TABLET | Refills: 3 | Status: SHIPPED | OUTPATIENT
Start: 2020-11-25

## 2020-11-25 RX ORDER — LACTOBACILLUS RHAMNOSUS GG 10B CELL
1 CAPSULE ORAL EVERY 12 HOURS
Status: DISCONTINUED | OUTPATIENT
Start: 2020-11-25 | End: 2020-11-25

## 2020-11-25 RX ORDER — GUAIFENESIN/DEXTROMETHORPHAN 100-10MG/5
5 SYRUP ORAL EVERY 4 HOURS PRN
Qty: 120 ML | Refills: 0 | Status: SHIPPED | OUTPATIENT
Start: 2020-11-25 | End: 2020-12-05

## 2020-11-25 RX ORDER — LANOLIN ALCOHOL/MO/W.PET/CERES
500 CREAM (GRAM) TOPICAL NIGHTLY
Qty: 30 TABLET | Refills: 3 | Status: SHIPPED | OUTPATIENT
Start: 2020-11-25

## 2020-11-25 RX ORDER — LACTOBACILLUS RHAMNOSUS GG 10B CELL
1 CAPSULE ORAL ONCE
Status: COMPLETED | OUTPATIENT
Start: 2020-11-25 | End: 2020-11-25

## 2020-11-25 RX ORDER — LACTOBACILLUS RHAMNOSUS GG 10B CELL
1 CAPSULE ORAL EVERY 12 HOURS
Status: DISCONTINUED | OUTPATIENT
Start: 2020-11-26 | End: 2020-11-25 | Stop reason: HOSPADM

## 2020-11-25 RX ORDER — BENZONATATE 100 MG/1
100 CAPSULE ORAL 3 TIMES DAILY PRN
Qty: 60 CAPSULE | Refills: 0 | Status: SHIPPED | OUTPATIENT
Start: 2020-11-25 | End: 2020-12-02

## 2020-11-25 RX ORDER — MELATONIN
1000 DAILY
Qty: 30 TABLET | Refills: 0 | Status: SHIPPED | OUTPATIENT
Start: 2020-11-25

## 2020-11-25 RX ORDER — ZINC SULFATE 50(220)MG
50 CAPSULE ORAL DAILY
Qty: 30 CAPSULE | Refills: 3 | COMMUNITY
Start: 2020-11-25

## 2020-11-25 RX ORDER — DEXAMETHASONE 6 MG/1
6 TABLET ORAL DAILY
Qty: 5 TABLET | Refills: 0 | Status: SHIPPED | OUTPATIENT
Start: 2020-11-25 | End: 2020-11-30

## 2020-11-25 RX ORDER — VITAMIN E 268 MG
400 CAPSULE ORAL 2 TIMES DAILY
Qty: 30 CAPSULE | Refills: 3 | Status: SHIPPED | OUTPATIENT
Start: 2020-11-25

## 2020-11-25 RX ADMIN — LOSARTAN POTASSIUM 100 MG: 50 TABLET, FILM COATED ORAL at 09:39

## 2020-11-25 RX ADMIN — PANTOPRAZOLE SODIUM 40 MG: 40 TABLET, DELAYED RELEASE ORAL at 06:20

## 2020-11-25 RX ADMIN — METOPROLOL SUCCINATE 100 MG: 50 TABLET, EXTENDED RELEASE ORAL at 09:39

## 2020-11-25 RX ADMIN — INSULIN LISPRO 4 UNITS: 100 INJECTION, SOLUTION INTRAVENOUS; SUBCUTANEOUS at 09:31

## 2020-11-25 RX ADMIN — INSULIN GLARGINE 15 UNITS: 100 INJECTION, SOLUTION SUBCUTANEOUS at 01:55

## 2020-11-25 RX ADMIN — INSULIN LISPRO 8 UNITS: 100 INJECTION, SOLUTION INTRAVENOUS; SUBCUTANEOUS at 12:04

## 2020-11-25 RX ADMIN — ZINC SULFATE 220 MG (50 MG) CAPSULE 50 MG: CAPSULE at 09:39

## 2020-11-25 RX ADMIN — AMLODIPINE BESYLATE 5 MG: 5 TABLET ORAL at 09:38

## 2020-11-25 RX ADMIN — Medication 400 UNITS: at 09:39

## 2020-11-25 RX ADMIN — ASPIRIN 81 MG CHEWABLE TABLET 81 MG: 81 TABLET CHEWABLE at 09:38

## 2020-11-25 RX ADMIN — CLOPIDOGREL BISULFATE 75 MG: 75 TABLET ORAL at 09:39

## 2020-11-25 RX ADMIN — DEXAMETHASONE 6 MG: 4 TABLET ORAL at 09:38

## 2020-11-25 RX ADMIN — REMDESIVIR 100 MG: 100 INJECTION, POWDER, LYOPHILIZED, FOR SOLUTION INTRAVENOUS at 09:36

## 2020-11-25 RX ADMIN — INSULIN LISPRO 6 UNITS: 100 INJECTION, SOLUTION INTRAVENOUS; SUBCUTANEOUS at 18:08

## 2020-11-25 RX ADMIN — ENOXAPARIN SODIUM 40 MG: 40 INJECTION SUBCUTANEOUS at 09:35

## 2020-11-25 RX ADMIN — Medication 10 ML: at 09:37

## 2020-11-25 RX ADMIN — ROSUVASTATIN CALCIUM 40 MG: 10 TABLET, FILM COATED ORAL at 09:38

## 2020-11-25 RX ADMIN — Medication 400 MG: at 09:38

## 2020-11-25 RX ADMIN — GUAIFENESIN 600 MG: 600 TABLET, EXTENDED RELEASE ORAL at 09:39

## 2020-11-25 RX ADMIN — Medication 1 CAPSULE: at 18:08

## 2020-11-25 ASSESSMENT — PAIN SCALES - GENERAL
PAINLEVEL_OUTOF10: 0
PAINLEVEL_OUTOF10: 0

## 2020-11-25 NOTE — PROGRESS NOTES
Pulse ox was_92_% on room air at rest.  Ambulated patient on room air. Oxygen saturation was _85___% on room air while ambulating. Oxygen applied. Recovery pulse ox was _93_% on __3__liters of oxygen while ambulating.

## 2020-11-25 NOTE — DISCHARGE SUMMARY
Internal Medicine Progress Note     [facility]     Rhea Morris. Azucena Cruz., F.A.TESSAOPanI. Mykel Cotton D.O., MAMADOU Valentin D.O. Sabrina Carney, MSN, APRN, NP-C  Jean Marie Villeda. Bria Wiggins, MSN, [id number] [facility]       Internal Medicine  Discharge Summary    NAME: Larry Washington  :  1950  MRN:  [id number]  PCP:Kevin Velez DO  ADMITTED: 2020      DISCHARGED: 20    ADMITTING PHYSICIAN: Rhea Herrera DO    CONSULTANT(S):   IP CONSULT TO INTERNAL MEDICINE  IP CONSULT TO INFECTIOUS DISEASES  IP CONSULT TO PHARMACY     ADMITTING DIAGNOSIS:   Acute respiratory failure with hypoxia ([facility] [address] [J96.01]     DISCHARGE DIAGNOSES:   1. Sepsis (POA) secondary to Covid 19 infection resulting in acute respiratory failure with hypoxia  2. Coronary artery disease status post PCI  3. Chronic, compensated, combined systolic/diastolic congestive heart failure  4. Insulin-dependent diabetes mellitus type 2  5. History of neuromuscular disorder  6. Essential hypertension  7. Gastroesophageal reflux disease    BRIEF HISTORY OF PRESENT ILLNESS:   Patient is a 18-year-old male who presented to the ED due to shortness of breath. He has been having progressive shortness of breath for the past 5 days. Shortness of breath is present sometimes at rest but mainly with exertion. He has associated nonproductive cough. He does have wheezing as well. He has been experiencing fever and chills and decreased appetite. He is on tolerating a liquid diet for now.     LABS[de-identified]  Lab Results   Component Value Date    WBC 8.2 2020    HGB 12.8 2020    HCT 39.2 2020     2020     2020    K 3.6 2020     2020    CREATININE 0.6 (L) 2020    BUN 17 2020    CO2 24 2020    GLUCOSE 240 (H) 2020    ALT 44 (H) 2020    AST 31 2020    INR 1.2 2020     Lab Results   Component Value Date    INR 1.2 11/25/2020    INR 1.3 11/24/2020    INR 1.3 11/23/2020    PROTIME 13.4 (H) 11/25/2020    PROTIME 14.4 (H) 11/24/2020    PROTIME 15.0 (H) 11/23/2020      Lab Results   Component Value Date    TSH 0.271 11/20/2020     Lab Results   Component Value Date    TRIG 74 11/23/2020    TRIG 222 (H) 08/05/2020    TRIG 152 (H) 08/12/2014     Lab Results   Component Value Date    HDL 26 11/23/2020    HDL 44 08/05/2020    HDL 40 08/12/2014     Lab Results   Component Value Date    LDLCALC 23 11/23/2020    LDLCALC 41 08/05/2020    LDLCALC 109 (H) 08/12/2014     Lab Results   Component Value Date    LABA1C 7.6 (H) 11/20/2020       IMAGING:  Xr Chest Portable    Result Date: 11/19/2020  EXAMINATION: ONE XRAY VIEW OF THE CHEST 11/19/2020 5:12 pm COMPARISON: 12/29/2019 HISTORY: ORDERING SYSTEM PROVIDED HISTORY: Shortness of breath TECHNOLOGIST PROVIDED HISTORY: Reason for exam:->Shortness of breath FINDINGS: Possible bilateral lower lobe opacities. There is no effusion or pneumothorax. The cardiomediastinal silhouette is without acute process. The osseous structures are without acute process. Possible bilateral lower lobe opacities when compared to prior study. HOSPITAL COURSE:   Delbra Bloch did well throughout the hospitalization. He completed a course of maximal Covid protocol therapy. This included completion of remdesivir therapy. He will complete a course of Decadron therapy upon discharge as well as appropriate vitamin supplementation. His inflammatory markers trended downward. Unfortunately, he will require nasal cannula oxygen upon discharge and this will be arranged. Otherwise, he became increasingly ambulatory. He understands the importance of quarantine upon discharge. BRIEF PHYSICAL EXAMINATION AND LABORATORIES ON DAY OF DISCHARGE:  VITALS:  BP (!) 140/80   Pulse 66   Temp 97.8 °F (36.6 °C) (Oral)   Resp 15   Ht 6' 1\" (1.854 m)   Wt 260 lb (117.9 kg)   SpO2 93%   BMI 34.30 kg/m²     HEENT:  PERRLA. EOMI.  Sclera clear. Buccal mucosa moist.  Nasal cannula oxygen is in place. Neck:  Supple. Trachea midline. No thyromegaly. No JVD. No bruits. Heart:  Rhythm regular, rate controlled. No murmurs. Lungs:  Symmetrical. Clear to auscultation bilaterally. No wheezes. No rhonchi. No rales. Abdomen: Soft. Non-tender. Non-distended. Bowel sounds positive. No organomegaly or masses. No pain on palpation    Extremities:  Peripheral pulses present. No peripheral edema. No ulcers. Neurologic:  Alert x 3. No focal deficit. Cranial nerves grossly intact. Skin:  No petechia. No hemorrhage. No wounds. DISPOSITION:  The patient's condition is good. At this time the patient is without objective evidence of an acute process requiring continuing hospitalization or inpatient management. They are stable for discharge with outpatient follow-up. I have spoken with the patient and discussed the results of the current hospitalization, in addition to providing specific details for the plan of care and counseling regarding the diagnosis and prognosis. The plan has been discussed in detail and they are aware of the specific conditions for emergent return, as well as the importance of follow-up. Their questions are answered at this time and they are agreeable with the plan for discharge to home    DISCHARGE MEDICATIONS:    [facility] Medication Instructions ZPO:[phone]    Printed on:11/25/20 [id number]   Medication Information                      albuterol-ipratropium (COMBIVENT RESPIMAT)  MCG/ACT AERS inhaler  Inhale 1 puff into the lungs every 6 hours             amLODIPine (NORVASC) 5 MG tablet  Take 1 tablet by mouth 2 times daily             Ascorbic Acid (VITAMIN C) 1000 MG tablet  Take 1 tablet by mouth daily             aspirin 81 MG tablet  Take 81 mg by mouth daily.              benzonatate (TESSALON) 100 MG capsule  Take 1 capsule by mouth 3 times daily as needed for Cough clopidogrel (PLAVIX) 75 MG tablet  Take 75 mg by mouth daily              dexamethasone (DECADRON) 6 MG tablet  Take 1 tablet by mouth daily for 5 days             DULoxetine (CYMBALTA) 60 MG extended release capsule  Take 60 mg by mouth 2 times daily             glipiZIDE (GLUCOTROL) 5 MG tablet  Take 10 mg by mouth 4 times daily (with meals and nightly)             guaiFENesin-dextromethorphan (ROBITUSSIN DM) 100-10 MG/5ML syrup  Take 5 mLs by mouth every 4 hours as needed for Cough             insulin 70-30 (HUMULIN 70/30) (70-30) 100 UNIT per ML injection vial  Inject 30 Units into the skin every morning              insulin 70-30 (HUMULIN 70/30) (70-30) 100 UNIT per ML injection vial  Inject 20 Units into the skin daily (before lunch)             insulin 70-30 (HUMULIN 70/30) (70-30) 100 UNIT per ML injection vial  Inject 30 Units into the skin Daily with supper             insulin glargine (LANTUS) 100 UNIT/ML injection vial  Inject 50 Units into the skin 2 times daily             losartan (COZAAR) 100 MG tablet  Take 1 tablet by mouth daily             magnesium oxide (MAG-OX) 400 MG tablet  Take 400 mg by mouth daily             Melatonin 10 MG TABS  Take 20 mg by mouth nightly             metoprolol succinate (TOPROL XL) 100 MG extended release tablet  Take 100 mg by mouth 2 times daily             niacin (SLO-NIACIN) 500 MG extended release tablet  Take 1 tablet by mouth nightly             rosuvastatin (CRESTOR) 40 MG tablet  Take 40 mg by mouth daily              sitaGLIPtan (JANUVIA) 100 MG tablet  Take 100 mg by mouth daily.              sodium chloride (OCEAN, BABY AYR) 0.65 % nasal spray  1 spray by Nasal route every 2 hours as needed for Congestion             tamsulosin (FLOMAX) 0.4 MG capsule  Take 0.4 mg by mouth nightly              vitamin D3 (CHOLECALCIFEROL) 25 MCG (1000 UT) TABS tablet  Take 1 tablet by mouth daily             vitamin E 400 UNIT capsule  Take 1 capsule by mouth 2 times daily             zinc sulfate (ZINCATE) 220 (50 Zn) MG capsule  Take 1 capsule by mouth daily                 FOLLOW UP/INSTRUCTIONS:  · This patient is instructed to follow-up with his primary care physician. · Patient is instructed to follow-up with the consults listed above as directed by them. · he is instructed to resume home medications and take new medications as indicated in the list above. · If the patient has a recurrence of symptoms, he is instructed to go to the ED. Preparing for this patient's discharge, including paperwork, orders, instructions, and meeting with patient did require > 40 minutes.     Brian Wells DO     11/25/2020  8:50 AM

## 2020-11-25 NOTE — PROGRESS NOTES
Physical Therapy Treatment Note    Room #:  0521/0521-01  Patient Name: Lorie Padilla  YOB: 1950  MRN: [id number]    Referring Provider:   Brian Wells DO      Date of Service: 11/25/2020    Evaluating Physical Therapist: Ok Capone, PT #[id number]       Diagnosis:   Acute respiratory failure with hypoxia ([address] [J96.01]    flulike symptoms for approximately 1 week. States that he is been having a cough and shortness of breath which is been progressively worsening. 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 ear    Non-ST elevation MI (NSTEMI) ([address]    Morbid obesity ([address]    Lumbar spinal stenosis    DDD (degenerative disc disease), lumbar    CVA (cerebrovascular accident) ([address]    Acute respiratory failure with hypoxia ([address]    COVID-19        Tentative placement recommendation: Subacute vs Home Health Physical Therapy if patient meets goals    Equipment recommendation:  To be determined      Prior Level of Function: Patient ambulated with cane prn, drives, right handed, glasses, retired  40 years  Rehab Potential: good  for baseline    Past medical history:   Past Medical History:   Diagnosis Date    Arthritis     CAD (coronary artery disease) 3/20/2014    Cancer ([address]     skin-x2 (ear-Dr. Laura Cortez)    Chronic fatigue fibromyalgia syndrome     Deafness in right ear     Diabetes mellitus ([address]     type 2    GERD (gastroesophageal reflux disease)     Hypertension     Morbid obesity ([address]     Neuromuscular disorder ([address]     Non-ST elevation MI (NSTEMI) ([address] 10/2015    patient was transported to [facility] due to [facility]version from [facility] Past Surgical History:   Procedure Laterality Date    CARDIAC CATHETERIZATION  3 11 14    Mildl to moderate CAD. Normal left venricular size and systolic functiion. Systemic hypertension. [facility]    COLONOSCOPY      CORONARY ANGIOPLASTY  10/2015    Dr. Yuri Reynaga MID/DISTAL CX. PCI of the mid and distal dx artery with [employer] ZACARIAS with MATT grade III flow    DIAGNOSTIC CARDIAC CATH LAB PROCEDURE  02/15/2016    PATENT STENT IN CX, LAD 30%, RCA 40% AT [facility]. EF 60% EF    ENDOSCOPY, COLON, DIAGNOSTIC      GALLBLADDER SURGERY  1982    In [address] Right     SHOULDER ARTHROSCOPY      SKIN CANCER EXCISION  01/2020    x2-ear-Dr. Zena Olson    TUMOR REMOVAL  bladder tumor    UPPER GASTROINTESTINAL ENDOSCOPY N/A 8/7/2020    EGD BIOPSY performed by Carlyn Parrish DO at [facility] ENDOSCOPY       Precautions: Up as tolerated, falls, alarm, Droplet plus and COVID-19 ,  deaf right    SUBJECTIVE:    Social history: Patient lives with spouse   in a ranch home  with 4 steps  to enter with Rail  Tub shower     Equipment owned: [employer],       [address]   How much difficulty turning over in bed?: A Little  How much difficulty sitting down on / standing up from a chair with arms?: A Little  How much difficulty moving from lying on back to sitting on side of bed?: A Little  How much help from another person moving to and from a bed to a chair?: A Little  How much help from another person needed to walk in hospital room?: A Little  How much help from another person for climbing 3-5 steps with a railing?: A Lot  AM-PAC Inpatient Mobility Raw Score : 17  AM-PAC Inpatient T-Scale Score : 42.13  Mobility Inpatient CMS 0-100% Score: 50.57  Mobility Inpatient CMS G-Code Modifier : CK    Nursing cleared patient for PT treatment.     OBJECTIVE;   Initial Evaluation  Date: 11/24/2020 Treatment Date:  11/25/2020       Short Term/ Long Term   Goals   Was pt agreeable to Eval/treatment? Yes   yes To be met in 5 days   Pain level   0/10    0/10    Bed Mobility    Rolling: Not assessed     Supine to sit: Minimal assist of 1    Sit to supine: Minimal assist of 1    Scooting: Minimal assist of 1   Rolling: Supervision    Supine to sit: Minimal assist of 1   Sit to supine: Supervision    Scooting: Supervision     Rolling: Independent    Supine to sit: Independent    Sit to supine: Independent    Scooting: Independent     Transfers Sit to stand: Minimal assist of 1 Cues for hand placement and safety  Sit to stand: Minimal assist of 1 cues for hand placement     Sit to stand: Modified Independent     Ambulation    2 x 10 feet using  wheeled walker with Minimal assist of 1   increased shortness of breath decreased O2 sat limiting distance  2 x 15 feet using  wheeled walker with Minimal assist of 1 V/C for pursed lip breathing, pacing, and safety.     2 x 50 feet using  wheeled walker with Modified Independent    Stair negotiation: ascended and descended   Not assessed       4 steps  1 rail with supervision    ROM Within functional limits        Strength BUE:  refer to OT eval  RLE:  3+/5  LLE:  3+/5   Increase strength in affected mm groups by 1/3 grade   Balance Sitting EOB:  good -  Dynamic Standing:  fair wheeled walker  Sitting EOB: good   Dynamic Standing: fair wheeled walker   Sitting EOB:  good    Dynamic Standing: good wheeled walker      Patient is Alert & Oriented x person, place, time and situation and follows directions    Sensation:  Patient  denies numbness and tingling     Edema:  none noted    Endurance: poor      Vitals:  3 liters  Blood Pressure at rest   Blood Pressure during session     Heart Rate at rest  Heart Rate during session    SPO2 at rest 94%  SPO2 during session 85% on room air; 93% on 3L after tx session       Patient education  Patient educated on role of Physical Therapy, risks of immobility, safety and plan of care, purse lip breathing, energy conservation and importance of positional changes for oxygen exchange      Patient response to education:   Pt verbalized understanding Pt demonstrated skill Pt requires further education in this area   Yes Partial Yes      Treatment:  Patient practiced and was instructed/facilitated in the following treatment: Patient laying supine on 3L his SPO2 was 94%. I took off the O2, pt was 90-91% after 5 minutes. Patient assisted to [facility] [place]  Sat edge of bed 25 minutes with Supervision  to increase dynamic sitting balance and activity tolerance. Pt still on room air and SPO2 was 89-90%. Pt stood, ambulated in room, O2 saturation decreased to 85% with increased shortness of breath and coughing. Patient ambulated back to bed and put 3L back on him. Returned seated additional 5 minutes and recovered to 93% after 2 minutes. Therapeutic Exercises:  not performed       At end of session, patient in bed with  call light and phone within reach,   all lines and tubes intact, nursing notified. Patient would benefit from continued skilled Physical Therapy to improve functional independence and quality of life. Patient's/ family goals   home        ASSESSMENT: Patient exhibits decreased strength, balance, coordination impairing functional mobility. Impaired endurance, decreased O2 saturation with minimal activity limiting gait distance and function; patient needing multiple rest periods throughout tx session. Pt's pulse ox dropped to 85% on room air while ambulating in the room. Once 3L put back on, his pulse ox luciana to 93%.     Plan of Care:     -Bed Mobility: Lower extremity exercises   -Sitting Balance: Incorporate reaching activities to activate trunk muscles   -Standing Balance: Perform strengthening exercises in standing to promote motor control with or without upper extremity support   -Transfers: Provide instruction on proper hand and foot position for adequate transfer of weight onto lower extremities and use of gait device  -Gait: Gait training and Standing activities to improve: base of support, weight shift, weight bearing    -Endurance: Utilize Supervised activities to increase level of endurance to allow for safe functional mobility including transfers and gait  and Use graduated activities to promote good breathing techniques and provide support and education to maximize respiratory function  -Stairs: Stair training with instruction on proper technique and hand placement on rail    Patient and or family understand(s) diagnosis, prognosis, and plan of care. Frequency of treatments: Patient will be seen    daily. Time in  11:37  Time out  12:17    Total Treatment Time  40 minutes        CPT codes:    Therapeutic activities (45708)   40 minutes  3 unit(s)   Jarek Moura  [facility]  LIC # [id number]

## 2020-11-25 NOTE — CARE COORDINATION
SS Note: COVID POSITIVE 11/19/20. Per 11/25 SW note pt plans to return home upon dc and does not want [address]. Note stated pt has no Home O2 company preference, today this SW called and faxed complete Home O2 referral to [facility], for pt dc today.   Electronically signed by IFEANYI Caruso on 11/25/2020 at 1:17 PM

## 2020-11-25 NOTE — PLAN OF CARE
Problem: Airway Clearance - Ineffective  Goal: Achieve or maintain patent airway  Outcome: Met This Shift     Problem: Gas Exchange - Impaired  Goal: Absence of hypoxia  Outcome: Met This Shift     Problem:  Body Temperature -  Risk of, Imbalanced  Goal: Ability to maintain a body temperature within defined limits  Outcome: Met This Shift

## 2020-11-25 NOTE — PROGRESS NOTES
[facility] PROGRESS NOTE    F/u SARS-COV-2 infection   FACE TO FACE    SUBJECTIVE:  Arlet Mclaughlin, 69 y.o., male     Pt was discussed with care team  Has cough discussed with pt proning/side rotation-again. Resting in bed- confusion- on and off- no acute distress   Cough is improving   Currently on 3 liters   Fevers better     ROS:GENERAL- as above             GENERAL:Temperature:  Current - Temp: 97.8 °F (36.6 °C); Max - Temp  Av.8 °F (36.6 °C)  Min: 97.8 °F (36.6 °C)  Max: 97.8 °F (36.6 °C)  Respiratory Rate : Resp  Av.5  Min: 15  Max: 18  Pulse Range: Pulse  Av.3  Min: 66  Max: 76  Blood Pressure Range:  Systolic (46LNA), MTC:109 , Min:130 , VZW:481   ; Diastolic (82IRE), PVX:69, Min:76, Max:80    Pulse ox Range: SpO2  Av %  Min: 93 %  Max: 96 %  24hr I & O:      Intake/Output Summary (Last 24 hours) at 2020 1358  Last data filed at 2020 1342  Gross per 24 hour   Intake 525 ml   Output --   Net 525 ml       CONSTITUTIONAL:   Resting in bed on back- no distress- on 6 liters nasal canula.    HEENT:    AT/NC  LUNGS:   Dec bs - on 6 liters nasal canula   CARDIOVASCULAR:   S1 and S2   ABDOMEN:     Normal bowel sounds, non-distended, non-tender   EXTREMITIES:   FROM    SKIN:     NO RASH   CNS:    NAD  PSYCH:   Pleasant     MEDS:  potassium chloride (KLOR-CON M) extended release tablet 40 mEq, PRN    Or  potassium bicarb-citric acid (EFFER-K) effervescent tablet 40 mEq, PRN    Or  potassium chloride 10 mEq/100 mL IVPB (Peripheral Line), PRN  insulin glargine (LANTUS) injection vial 15 Units, BID  guaiFENesin-dextromethorphan (ROBITUSSIN DM) 100-10 MG/5ML syrup 5 mL, Q4H PRN  0.9 % sodium chloride bolus, PRN  insulin lispro (HUMALOG) injection vial 0-12 Units, TID WC  insulin lispro (HUMALOG) injection vial 0-6 Units, Nightly  metoprolol succinate (TOPROL XL) extended release tablet 100 mg, BID  DULoxetine (CYMBALTA) extended release capsule 60 mg, Nightly  tamsulosin (FLOMAX) capsule 0.4 mg, Nightly  vitamin E capsule 400 Units, BID  niacin (SLO-NIACIN) extended release tablet 500 mg, Nightly  zinc sulfate (ZINCATE) capsule 50 mg, Daily  guaiFENesin (MUCINEX) extended release tablet 600 mg, BID  vitamin D (ERGOCALCIFEROL) capsule 50,000 Units, Weekly  polyvinyl alcohol (LIQUIFILM TEARS) 1.4 % ophthalmic solution 1 drop, PRN  loperamide (IMODIUM) capsule 2 mg, 4x Daily PRN  aspirin chewable tablet 81 mg, Daily  amLODIPine (NORVASC) tablet 5 mg, BID  clopidogrel (PLAVIX) tablet 75 mg, Daily  losartan (COZAAR) tablet 100 mg, Daily  magnesium oxide (MAG-OX) tablet 400 mg, Daily  pantoprazole (PROTONIX) tablet 40 mg, QAM AC  sodium chloride flush 0.9 % injection 10 mL, 2 times per day  sodium chloride flush 0.9 % injection 10 mL, PRN  acetaminophen (TYLENOL) tablet 650 mg, Q6H PRN    Or  acetaminophen (TYLENOL) suppository 650 mg, Q6H PRN  polyethylene glycol (GLYCOLAX) packet 17 g, Daily PRN  glucose (GLUTOSE) 40 % oral gel 15 g, PRN  dextrose 50 % IV solution, PRN  glucagon (rDNA) injection 1 mg, PRN  dextrose 5 % solution, PRN  enoxaparin (LOVENOX) injection 40 mg, BID  benzonatate (TESSALON) capsule 100 mg, TID PRN  albuterol-ipratropium (COMBIVENT RESPIMAT)  MCG/ACT inhaler 1 puff, Q6H  dexamethasone (DECADRON) tablet 6 mg, Daily  rosuvastatin (CRESTOR) tablet 40 mg, Daily          Data:  Lab Results   Component Value Date    COVID19 DETECTED 11/19/2020    COVID19 Not Detected 08/05/2020     COVID-19/SARS-COV-2 LABS  Recent Labs     11/23/20  0656 11/24/20  1058 11/25/20  0524   CRP 5.2*  --   --    PROCAL 0.06  --   --    FERRITIN 373  --   --    *  --   --    TROPONINI <0.01  --   --    DDIMER 430 369 319   FIBRINOGEN >700* 656* 618*   INR 1.3 1.3 1.2   PROTIME 15.0* 14.4* 13.4*   AST 35 31 31   ALT 30 37 44*   TRIG 74  --   --      Lab Results   Component Value Date    CHOL 64 11/23/2020    TRIG 74 11/23/2020    HDL 26 11/23/2020    LDLCALC 23 11/23/2020    LABVLDL 15 11/23/2020     Lab Results   Component Value Date/Time    VITD25 20 (L) 11/23/2020 06:56 AM     Recent Labs     11/23/20  0656 11/24/20  1058 11/25/20  0524   WBC 8.7 7.1 8.2   HGB 12.0* 11.9* 12.8   HCT 35.7* 34.4* 39.2    202 246   MCV 92.0 90.1 92.9   MCH 30.9 31.2 30.3   MCHC 33.6 34.6* 32.7   RDW 13.3 13.2 13.1   METASPCT  --   --  0.9   LYMPHOPCT 8.3* 9.6* 8.0*   MONOPCT 6.2 7.0 6.2   BASOPCT 0.1 0.1 0.1   MONOSABS 0.54 0.50 0.49   LYMPHSABS 0.72* 0.71* 0.66*   EOSABS 0.00* 0.00* 0.00*   BASOSABS 0.01 0.00 0.00     Recent Labs     11/23/20  0656 11/24/20  1058 11/25/20  0524    136 138   K 3.6 3.2* 3.6    100 101   CO2 24 24 24   BUN 16 16 17   CREATININE 0.7 0.7 0.6*   GFRAA >60 >60 >60   LABGLOM >60 >60 >60   GLUCOSE 191* 333* 240*   PROT 6.4 6.4 6.9   LABALBU 2.8* 2.8* 2.9*   CALCIUM 8.2* 8.4* 8.5*   BILITOT 0.5 0.5 0.6   ALKPHOS 78 87 86   AST 35 31 31   ALT 30 37 44*     U/A:    Lab Results   Component Value Date    COLORU DKYELLOW 10/02/2014    PROTEINU TRACE 10/02/2014    PHUR 6.5 10/02/2014    WBCUA NONE 10/02/2014    RBCUA NONE 10/02/2014    BACTERIA NONE 10/02/2014    CLARITYU Clear 10/02/2014    SPECGRAV 1.025 10/02/2014    LEUKOCYTESUR Negative 10/02/2014    UROBILINOGEN 0.2 10/02/2014    BILIRUBINUR SMALL 10/02/2014    BLOODU TRACE 10/02/2014    GLUCOSEU Negative 10/02/2014        MICRO  Blood cultures   Blood Culture, Routine   Date Value Ref Range Status   11/19/2020 5 Days no growth  Final       ASSESSMENT:    Active Hospital Problems    Diagnosis Date Noted    COVID-19 [U07.1] 11/20/2020    Acute respiratory failure with hypoxia ([facility] [address] [J96.01] 11/19/2020     SARS-COV-2- positive with pneumonia  Leukopenia resolved  Fevers- improved     · Remdesivir day # 5  · DECADRON 6MG QDAY   - for 10 days   · Vitamins : vitamin C, thiamine, zinc, vitamin D  · Discussed proning, side to side positions  · Is   · Inc activity   · crp- 5.2 Sed rate- 88  · Received convalescent plasma on 11/23/2020- no issues   · Patient is for discharge today- going home with oxygen    PLAN: CONTINUE CURRENT MEDICATIONS AND SUPPORTIVE CARE. [facility]  11/25/2020    As above    This is a face to face encounter with Carri Brown on 11/25/20. I have performed and participated in the history, exam, medical decision making, and  POC  with the NURSE PRACTITIONER and provided the instruction and education regarding this patient's care. Imaging and labs were reviewed per medical records and any ID pertinent labs were addressed with the patient. The patient was educated about the diagnosis, prognosis, indications, risks and benefits of treatment. Pt had the opportunity to ask questions. All questions were answered. On 3L sat 92%  supined does not want to lay on his side  Has diarrhea  No f/c/n/v/d  Glasses  Lungs dec abd soft nt  Ext no edema  Can d/c with o2    Thank you for involving me in the care of Carri Brown. Please do not hesitate to call for any questions or concerns.     Electronically signed by Rubi Perry MD on 11/25/2020 at 3:25 PM    Phone [phone]  Fax [phone]

## 2020-11-26 LAB — CULTURE, BLOOD 2: NORMAL

## 2020-11-27 ENCOUNTER — CARE COORDINATION (OUTPATIENT)
Dept: CASE MANAGEMENT | Age: 70
End: 2020-11-27

## 2020-11-28 ENCOUNTER — CARE COORDINATION (OUTPATIENT)
Dept: CASE MANAGEMENT | Age: 70
End: 2020-11-28

## 2020-11-28 NOTE — CARE COORDINATION
Patient contacted regarding [id number] diagnosis\". Discussed COVID-19 related testing which was available at this time. Test results were positive. Patient informed of results, if available? Yes    Care Transition Nurse/ Ambulatory Care Manager contacted the family by telephone to perform post discharge assessment. Call within 2 business days of discharge: Yes. Verified name and  with family as identifiers. Provided introduction to self, and explanation of the CTN/ACM role, and reason for call due to risk factors for infection and/or exposure to COVID-19. Symptoms reviewed with family who verbalized the following symptoms: fatigue, cough, shortness of breath, no new symptoms and no worsening symptoms. Due to no new or worsening symptoms encounter was not routed to provider for escalation. Discussed follow-up appointments. [facility] follow up appointment(s): No future appointments. Non-[facility] follow up appointment(s): wife will call MON for appt with Dr Eliazar Santos services provided:  Obtained and reviewed discharge summary and/or continuity of care documents     Advance Care Planning:   Does patient have an Advance Directive:  reviewed and current. Patient has following risk factors of: COVID POSITIVE. CTN/ACM reviewed discharge instructions, medical action plan and red flags such as increased shortness of breath, increasing fever and signs of decompensation with family who verbalized understanding. Discussed exposure protocols and quarantine with CDC Guidelines What to do if you are sick with coronavirus disease .  Family was given an opportunity for questions and concerns. The family agrees to contact the [facility] exposure line [phone], [facility] department [address]: ([phone]) and PCP office for questions related to their healthcare. CTN/ACM provided contact information for future needs.     Reviewed and educated family on any new and changed medications related to discharge diagnosis     Patient/family/caregiver given information for [facility] Loop and agrees to enroll yes  Patient's preferred e-mail: [email or web address]   Patient's preferred phone number: [phone]  Based on Loop alert triggers, patient will be contacted by nurse care manager for worsening symptoms. Pt will be further monitored by COVID Loop Team based on severity of symptoms and risk factors. Called & spoke with pt wife Agustin Sol, pt asked the CTN to talk to her, he was too tired. Wife stated the pt is very tired, still has a cough & SOB. Wife stated no worse, \"might be a little better\"  Hospital pharmacy did not dispense the Niacin, wife left a message & will call again on MON to discuss.     [address] Transition Nurse  [phone]

## 2020-12-01 ENCOUNTER — CARE COORDINATION (OUTPATIENT)
Dept: CARE COORDINATION | Age: 70
End: 2020-12-01

## 2020-12-01 NOTE — CARE COORDINATION
RN/LPN placed call to patient to respond to Red alert due to cough, Breathing Issues and Worsening of symptoms in [facility] Loop. Patient reports : spoke with patients wife Furman Schlatter who states that the patients cough has worsened and when he coughs he becomes sob. She reports Pts SpO2 95% on room air. Pt has an inhaler and tessalon perle for cough and sob. Pts wife stated that he refused to go to the ER this morning, but will monitor his symptoms closely. RN/LPN instructed that the Pt bee seen by PCP or to return to the ER if symptoms worsen. Due to new or worsening symptoms PCP notified via [facility].

## 2020-12-05 ENCOUNTER — CARE COORDINATION (OUTPATIENT)
Dept: CARE COORDINATION | Age: 70
End: 2020-12-05

## 2020-12-05 NOTE — CARE COORDINATION
Yellow alert noted in Loop remote symptom monitoring program. Messaged patient to notify Awa Polanco if symptoms have worsened since yesterday. Thank you for checking in with us. We would like to know if your symptoms are improving since you were seen? If they are not or if you would like to speak with a nurse, please let us know. Please make sure you are drinking plenty of water, eating nutritious meals, and getting plenty of rest. Seek emergency medical care immediately if you have trouble breathing, persistent pain or pressure in your chest.  Loop nurses are available M-F 8-4 and S/S 9-1. We hope that you are improving each day.  JENNY Gordillo   No response in loop

## 2022-04-22 ENCOUNTER — APPOINTMENT (OUTPATIENT)
Dept: CT IMAGING | Age: 72
End: 2022-04-22
Payer: MEDICARE

## 2022-04-22 ENCOUNTER — HOSPITAL ENCOUNTER (EMERGENCY)
Age: 72
Discharge: HOME | End: 2022-04-22
Attending: EMERGENCY MEDICINE
Payer: MEDICARE

## 2022-04-22 ENCOUNTER — APPOINTMENT (OUTPATIENT)
Dept: GENERAL RADIOLOGY | Age: 72
End: 2022-04-22
Payer: MEDICARE

## 2022-04-22 VITALS
TEMPERATURE: 97.3 F | WEIGHT: 260 LBS | DIASTOLIC BLOOD PRESSURE: 86 MMHG | HEART RATE: 100 BPM | RESPIRATION RATE: 20 BRPM | BODY MASS INDEX: 34.3 KG/M2 | SYSTOLIC BLOOD PRESSURE: 142 MMHG | OXYGEN SATURATION: 95 %

## 2022-04-22 DIAGNOSIS — R07.9 CHEST PAIN, UNSPECIFIED TYPE: ICD-10-CM

## 2022-04-22 DIAGNOSIS — R06.09 DYSPNEA ON EXERTION: Primary | ICD-10-CM

## 2022-04-22 LAB
ALBUMIN SERPL-MCNC: 3.6 G/DL (ref 3.5–5.2)
ALP BLD-CCNC: 60 U/L (ref 40–129)
ALT SERPL-CCNC: 12 U/L (ref 0–40)
ANION GAP SERPL CALCULATED.3IONS-SCNC: 11 MMOL/L (ref 7–16)
AST SERPL-CCNC: 10 U/L (ref 0–39)
BACTERIA: ABNORMAL /HPF
BASOPHILS ABSOLUTE: 0.05 E9/L (ref 0–0.2)
BASOPHILS RELATIVE PERCENT: 0.5 % (ref 0–2)
BILIRUB SERPL-MCNC: 0.6 MG/DL (ref 0–1.2)
BILIRUBIN URINE: NEGATIVE
BLOOD, URINE: NEGATIVE
BUN BLDV-MCNC: 15 MG/DL (ref 6–23)
CALCIUM SERPL-MCNC: 8.8 MG/DL (ref 8.6–10.2)
CHLORIDE BLD-SCNC: 103 MMOL/L (ref 98–107)
CLARITY: CLEAR
CO2: 25 MMOL/L (ref 22–29)
COLOR: YELLOW
CREAT SERPL-MCNC: 0.9 MG/DL (ref 0.7–1.2)
D DIMER: <200 NG/ML DDU
EKG ATRIAL RATE: 120 BPM
EKG P AXIS: 40 DEGREES
EKG P-R INTERVAL: 154 MS
EKG Q-T INTERVAL: 336 MS
EKG QRS DURATION: 116 MS
EKG QTC CALCULATION (BAZETT): 474 MS
EKG R AXIS: -69 DEGREES
EKG T AXIS: 39 DEGREES
EKG VENTRICULAR RATE: 120 BPM
EOSINOPHILS ABSOLUTE: 0.26 E9/L (ref 0.05–0.5)
EOSINOPHILS RELATIVE PERCENT: 2.8 % (ref 0–6)
EPITHELIAL CELLS, UA: ABNORMAL /HPF
GFR AFRICAN AMERICAN: >60
GFR NON-AFRICAN AMERICAN: >60 ML/MIN/1.73
GLUCOSE BLD-MCNC: 313 MG/DL (ref 74–99)
GLUCOSE URINE: 500 MG/DL
HCT VFR BLD CALC: 42.1 % (ref 37–54)
HEMOGLOBIN: 13.9 G/DL (ref 12.5–16.5)
IMMATURE GRANULOCYTES #: 0.04 E9/L
IMMATURE GRANULOCYTES %: 0.4 % (ref 0–5)
INFLUENZA A BY PCR: NOT DETECTED
INFLUENZA B BY PCR: NOT DETECTED
KETONES, URINE: NEGATIVE MG/DL
LEUKOCYTE ESTERASE, URINE: NEGATIVE
LYMPHOCYTES ABSOLUTE: 2.05 E9/L (ref 1.5–4)
LYMPHOCYTES RELATIVE PERCENT: 22.2 % (ref 20–42)
MAGNESIUM: 2 MG/DL (ref 1.6–2.6)
MCH RBC QN AUTO: 31.7 PG (ref 26–35)
MCHC RBC AUTO-ENTMCNC: 33 % (ref 32–34.5)
MCV RBC AUTO: 95.9 FL (ref 80–99.9)
MONOCYTES ABSOLUTE: 0.59 E9/L (ref 0.1–0.95)
MONOCYTES RELATIVE PERCENT: 6.4 % (ref 2–12)
NEUTROPHILS ABSOLUTE: 6.24 E9/L (ref 1.8–7.3)
NEUTROPHILS RELATIVE PERCENT: 67.7 % (ref 43–80)
NITRITE, URINE: NEGATIVE
PDW BLD-RTO: 13.6 FL (ref 11.5–15)
PH UA: 5.5 (ref 5–9)
PLATELET # BLD: 165 E9/L (ref 130–450)
PMV BLD AUTO: 10.7 FL (ref 7–12)
POTASSIUM REFLEX MAGNESIUM: 3.4 MMOL/L (ref 3.5–5)
PRO-BNP: 38 PG/ML (ref 0–125)
PROTEIN UA: NEGATIVE MG/DL
RBC # BLD: 4.39 E12/L (ref 3.8–5.8)
RBC UA: ABNORMAL /HPF (ref 0–2)
SARS-COV-2, NAAT: NORMAL
SODIUM BLD-SCNC: 139 MMOL/L (ref 132–146)
SPECIFIC GRAVITY UA: >=1.03 (ref 1–1.03)
TOTAL PROTEIN: 6.7 G/DL (ref 6.4–8.3)
TROPONIN, HIGH SENSITIVITY: 7 NG/L (ref 0–11)
UROBILINOGEN, URINE: 0.2 E.U./DL
WBC # BLD: 9.2 E9/L (ref 4.5–11.5)
WBC UA: ABNORMAL /HPF (ref 0–5)

## 2022-04-22 PROCEDURE — 93005 ELECTROCARDIOGRAM TRACING: CPT | Performed by: PHYSICIAN ASSISTANT

## 2022-04-22 PROCEDURE — 71275 CT ANGIOGRAPHY CHEST: CPT

## 2022-04-22 PROCEDURE — 81001 URINALYSIS AUTO W/SCOPE: CPT

## 2022-04-22 PROCEDURE — 85378 FIBRIN DEGRADE SEMIQUANT: CPT

## 2022-04-22 PROCEDURE — 87635 SARS-COV-2 COVID-19 AMP PRB: CPT

## 2022-04-22 PROCEDURE — 6360000002 HC RX W HCPCS

## 2022-04-22 PROCEDURE — 83735 ASSAY OF MAGNESIUM: CPT

## 2022-04-22 PROCEDURE — 87502 INFLUENZA DNA AMP PROBE: CPT

## 2022-04-22 PROCEDURE — 85025 COMPLETE CBC W/AUTO DIFF WBC: CPT

## 2022-04-22 PROCEDURE — 84484 ASSAY OF TROPONIN QUANT: CPT

## 2022-04-22 PROCEDURE — 71045 X-RAY EXAM CHEST 1 VIEW: CPT

## 2022-04-22 PROCEDURE — 80053 COMPREHEN METABOLIC PANEL: CPT

## 2022-04-22 PROCEDURE — 83880 ASSAY OF NATRIURETIC PEPTIDE: CPT

## 2022-04-22 PROCEDURE — 87088 URINE BACTERIA CULTURE: CPT

## 2022-04-22 PROCEDURE — 99285 EMERGENCY DEPT VISIT HI MDM: CPT

## 2022-04-22 PROCEDURE — 96374 THER/PROPH/DIAG INJ IV PUSH: CPT

## 2022-04-22 PROCEDURE — 6360000004 HC RX CONTRAST MEDICATION: Performed by: RADIOLOGY

## 2022-04-22 PROCEDURE — 96375 TX/PRO/DX INJ NEW DRUG ADDON: CPT

## 2022-04-22 RX ORDER — DIPHENHYDRAMINE HYDROCHLORIDE 50 MG/ML
25 INJECTION INTRAMUSCULAR; INTRAVENOUS ONCE
Status: COMPLETED | OUTPATIENT
Start: 2022-04-22 | End: 2022-04-22

## 2022-04-22 RX ORDER — METHYLPREDNISOLONE SODIUM SUCCINATE 125 MG/2ML
125 INJECTION, POWDER, LYOPHILIZED, FOR SOLUTION INTRAMUSCULAR; INTRAVENOUS ONCE
Status: COMPLETED | OUTPATIENT
Start: 2022-04-22 | End: 2022-04-22

## 2022-04-22 RX ADMIN — DIPHENHYDRAMINE HYDROCHLORIDE 25 MG: 50 INJECTION, SOLUTION INTRAMUSCULAR; INTRAVENOUS at 17:27

## 2022-04-22 RX ADMIN — IOPAMIDOL 75 ML: 755 INJECTION, SOLUTION INTRAVENOUS at 18:23

## 2022-04-22 RX ADMIN — METHYLPREDNISOLONE SODIUM SUCCINATE 125 MG: 125 INJECTION, POWDER, FOR SOLUTION INTRAMUSCULAR; INTRAVENOUS at 17:20

## 2022-04-22 ASSESSMENT — PAIN - FUNCTIONAL ASSESSMENT
PAIN_FUNCTIONAL_ASSESSMENT: NONE - DENIES PAIN
PAIN_FUNCTIONAL_ASSESSMENT: NONE - DENIES PAIN

## 2022-04-24 ASSESSMENT — ENCOUNTER SYMPTOMS
WHEEZING: 0
RHINORRHEA: 0
DIARRHEA: 0
BACK PAIN: 0
VOMITING: 0
TROUBLE SWALLOWING: 0
VOICE CHANGE: 0
ABDOMINAL PAIN: 0
SHORTNESS OF BREATH: 1
PHOTOPHOBIA: 0
NAUSEA: 0

## 2022-04-25 LAB — URINE CULTURE, ROUTINE: NORMAL

## 2022-04-25 NOTE — ED PROVIDER NOTES
66y.o. year old male presenting to the emergency room with concerns of shortness of breath beginning 3 weeks ago. patient reports that symptom's onset 3 weeks priror. Worsen with exertion. Improves with rest. Severity of moderate, with no radiation. Symptoms are constant in timing. Symptoms described as fatigue and difficulties with daily activities. Patient reports  associated symptoms of chest discomfort. Patient in  no acute distress. sent in by PCP, previous history of COVID. Chief Complaint   Patient presents with    Shortness of Breath     increasing shortness of breath x 3 weeks, sent in by dr Lynda Juarez, chest pain inter all week, no n/v, no dizzness, fatigue, in bed since monday       Review of Systems   Constitutional: Negative for chills, fatigue and fever. HENT: Negative for congestion, rhinorrhea, trouble swallowing and voice change. Eyes: Negative for photophobia and visual disturbance. Respiratory: Positive for shortness of breath. Negative for wheezing. Cardiovascular: Positive for chest pain. Gastrointestinal: Negative for abdominal pain, diarrhea, nausea and vomiting. Genitourinary: Positive for difficulty urinating. Negative for frequency, hematuria and urgency. Musculoskeletal: Negative for arthralgias, back pain and neck pain. Skin: Negative for rash and wound. Neurological: Negative for dizziness, syncope and headaches. Psychiatric/Behavioral: Negative for behavioral problems and confusion. The patient is not nervous/anxious. Physical Exam  Vitals reviewed. Constitutional:       General: He is not in acute distress. Appearance: Normal appearance. HENT:      Head: Normocephalic. Right Ear: External ear normal.      Left Ear: External ear normal.      Nose: Nose normal.      Mouth/Throat:      Mouth: Mucous membranes are moist.   Eyes:      General:         Right eye: No discharge. Left eye: No discharge.       Conjunctiva/sclera: Conjunctivae normal.   Cardiovascular:      Rate and Rhythm: Normal rate and regular rhythm. Heart sounds: No friction rub. Pulmonary:      Effort: Pulmonary effort is normal. No accessory muscle usage or respiratory distress. Breath sounds: No stridor. No decreased breath sounds, wheezing, rhonchi or rales. Abdominal:      General: There is no distension. Tenderness: There is no abdominal tenderness. There is no guarding or rebound. Musculoskeletal:         General: No tenderness or deformity. Cervical back: Normal range of motion. No rigidity or tenderness. Skin:     General: Skin is warm. Coloration: Skin is not jaundiced. Neurological:      Mental Status: He is alert. Sensory: No sensory deficit. Motor: No weakness. Psychiatric:         Mood and Affect: Mood normal.         Behavior: Behavior normal.          Procedures     EKG: This EKG is signed by emergency department physician. Rate: 119  Rhythm: Sinus  AXIS:left  ST Changes:no st elevations  Interpretation: sinus tachycardia  Comparison: stable as compared to patient's most recent EKG     MDM  Number of Diagnoses or Management Options  Chest pain, unspecified type  Dyspnea on exertion  Diagnosis management comments: 66year old male presenting to the ER with complaints of shortness of breath and chest pain on exertion. Previous history of COVID. Negative ddimer, troponin and BNP. CTA with no evidence of pneumonia. Patient ambulated and remained above 90%. Will plan to discharge patient with followup with cardiology and pcp. Patient stable at this time. Return in structions given. ED Course as of 04/24/22 2120 Fri Apr 22, 2022   1606   ATTENDING PROVIDER ATTESTATION:     I have personally performed and/or participated in the history, exam, medical decision making, and procedures and agree with all pertinent clinical information unless otherwise noted.     I have also reviewed and agree with the past medical, family and social history unless otherwise noted. I have discussed this patient in detail with the resident and provided the instruction and education regarding the evidence-based evaluation and treatment of SOB. History: patient is complaining of exertional dyspnea that is increasing. He has occasional dry cough. No chest pain or heaviness. No peripheral edema or tenderness. Patient had COVID in 2020 and was on oxygen for prolonged period. My findings: Peter Mas is a 66 y.o. male whom is in no distress. Physical exam reveals no pallor. Heart RRR, lungs CTA, abdomen is soft and nontender. No peripheral edema or tenderness. My plan: Symptomatic and supportive care. Will evaluate with CT and labs. Electronically signed by Jack Olsen DO on 4/22/22 at 4:06 PM EDT       [JS]   Areille Nipple to patient, discussed with patient and wife about results and stressed the need for followup with cardiology, PCP. instructed patient to return if any new or worsening chest pain or other symptoms develops. [CHAYITO]      ED Course User Index  [CHAYITO] Shaq Thomas DO  [JS] Jack Olsen DO        ED Course as of 04/24/22 2120 Fri Apr 22, 2022   1606   ATTENDING PROVIDER ATTESTATION:     I have personally performed and/or participated in the history, exam, medical decision making, and procedures and agree with all pertinent clinical information unless otherwise noted. I have also reviewed and agree with the past medical, family and social history unless otherwise noted. I have discussed this patient in detail with the resident and provided the instruction and education regarding the evidence-based evaluation and treatment of SOB. History: patient is complaining of exertional dyspnea that is increasing. He has occasional dry cough. No chest pain or heaviness. No peripheral edema or tenderness. Patient had COVID in 2020 and was on oxygen for prolonged period.     My findings: Vicki Loya is a 66 y.o. male whom is in no distress. Physical exam reveals no pallor. Heart RRR, lungs CTA, abdomen is soft and nontender. No peripheral edema or tenderness. My plan: Symptomatic and supportive care. Will evaluate with CT and labs. Electronically signed by Guillermo Hernández DO on 4/22/22 at 4:06 PM EDT       [JS]   Renuka Westby to patient, discussed with patient and wife about results and stressed the need for followup with cardiology, PCP. instructed patient to return if any new or worsening chest pain or other symptoms develops. [CHAYITO]      ED Course User Index  [CHAYITO] Debbie Jane DO  [JS] Guillermo Hernández DO       --------------------------------------------- PAST HISTORY ---------------------------------------------  Past Medical History:  has a past medical history of Arthritis, CAD (coronary artery disease), Cancer ([facility] [address] Chronic fatigue fibromyalgia syndrome, Deafness in right ear, Diabetes mellitus ([facility] [address] GERD (gastroesophageal reflux disease), Hypertension, Morbid obesity ([facility] [address] Neuromuscular disorder ([facility] [address] and Non-ST elevation MI (NSTEMI) ([facility] [address] Past Surgical History:  has a past surgical history that includes Shoulder arthroscopy; Gallbladder surgery (1982); tumor removal (bladder tumor); Rotator cuff repair (Right); Endoscopy, colon, diagnostic; Colonoscopy; Cardiac catheterization (3 11 14); Coronary angioplasty (10/2015); Diagnostic Cardiac Cath Lab Procedure (02/15/2016); Skin cancer excision (01/2020); Cataract removal (Bilateral); and Upper gastrointestinal endoscopy (N/A, 8/7/2020). Social History:  reports that he has never smoked. He has never used smokeless tobacco. He reports that he does not drink alcohol and does not use drugs. Family History: He was adopted. Family history is unknown by patient. The patients home medications have been reviewed.     Allergies: Poison ivy extract, Iodine, and Metformin and - 5.2 g/dL    Total Bilirubin 0.6 0.0 - 1.2 mg/dL    Alkaline Phosphatase 60 40 - 129 U/L    ALT 12 0 - 40 U/L    AST 10 0 - 39 U/L   Troponin   Result Value Ref Range    Troponin, High Sensitivity 7 0 - 11 ng/L   Brain Natriuretic Peptide   Result Value Ref Range    Pro-BNP 38 0 - 125 pg/mL   Urinalysis with Microscopic   Result Value Ref Range    Color, UA Yellow Straw/Yellow    Clarity, UA Clear Clear    Glucose, Ur 500 (A) Negative mg/dL    Bilirubin Urine Negative Negative    Ketones, Urine Negative Negative mg/dL    Specific Gravity, UA >=1.030 1.005 - 1.030    Blood, Urine Negative Negative    pH, UA 5.5 5.0 - 9.0    Protein, UA Negative Negative mg/dL    Urobilinogen, Urine 0.2 <2.0 E.U./dL    Nitrite, Urine Negative Negative    Leukocyte Esterase, Urine Negative Negative    WBC, UA 2-5 0 - 5 /HPF    RBC, UA NONE 0 - 2 /HPF    Epithelial Cells, UA RARE /HPF    Bacteria, UA RARE (A) None Seen /HPF   D-Dimer, Quantitative   Result Value Ref Range    D-Dimer, Quant <200 ng/mL DDU   Magnesium   Result Value Ref Range    Magnesium 2.0 1.6 - 2.6 mg/dL   EKG 12 Lead   Result Value Ref Range    Ventricular Rate 120 BPM    Atrial Rate 120 BPM    P-R Interval 154 ms    QRS Duration 116 ms    Q-T Interval 336 ms    QTc Calculation (Bazett) 474 ms    P Axis 40 degrees    R Axis -69 degrees    T Axis 39 degrees       Radiology:  CTA PULMONARY W CONTRAST   Final Result   Technically limited study to evaluate the pulmonary arteries due to   suboptimal opacification. No evidence for pneumonia. RECOMMENDATIONS:   Unavailable         XR CHEST PORTABLE   Final Result   No acute process. ------------------------- NURSING NOTES AND VITALS REVIEWED ---------------------------  Date / Time Roomed:  4/22/2022  3:18 PM  ED Bed Assignment:  10/10    The nursing notes within the ED encounter and vital signs as below have been reviewed.    BP (!) 142/86   Pulse 100   Temp 97.3 °F (36.3 °C) (Temporal)   Resp 20 Wt 260 lb (117.9 kg)   SpO2 95%   BMI 34.30 kg/m²   Oxygen Saturation Interpretation: Normal      ------------------------------------------ PROGRESS NOTES ------------------------------------------  I have spoken with the patient and discussed todays results, in addition to providing specific details for the plan of care and counseling regarding the diagnosis and prognosis. Their questions are answered at this time and they are agreeable with the plan. I discussed at length with them reasons for immediate return here for re evaluation. They will followup with primary care by calling their office tomorrow. --------------------------------- ADDITIONAL PROVIDER NOTES ---------------------------------  At this time the patient is without objective evidence of an acute process requiring hospitalization or inpatient management. They have remained hemodynamically stable throughout their entire ED visit and are stable for discharge with outpatient follow-up. The plan has been discussed in detail and they are aware of the specific conditions for emergent return, as well as the importance of follow-up. Discharge Medication List as of 4/22/2022  8:04 PM          Diagnosis:  1. Dyspnea on exertion    2. Chest pain, unspecified type        Disposition:  Patient's disposition: Discharge to home  Patient's condition is stable. Attending was present and available throughout encounter including all critical portions;  See Attending Note/Attestation for Final [address] DO  Resident  04/24/22 0023

## 2022-05-12 NOTE — PROGRESS NOTES
[facility] Cardiology Progress Note  Dr. Otis Cheung      Referring Physician: Agustín Arriaga DO  CHIEF COMPLAINT:   Chief Complaint   Patient presents with    Coronary Artery Disease     NP per ER DX chest pain, patient c/o SOBOE and chest pains 2 nights ago        HISTORY OF PRESENT ILLNESS:   66year old male with history of CAD, diabetes and hypertension is here due to chest pain. Retrosternal chest pain with radiation to both arms and jaw, with shortness of breath, exertion, better with rest, no palpitations, no pedal edema, no PND, no orthopnea, no syncope, no presyncopal episodes. Past Medical History:   Diagnosis Date    Arthritis     CAD (coronary artery disease) 3/20/2014    Cancer ([facility]     skin-x2 (ear-Dr. Lopez Julien)    Chronic fatigue fibromyalgia syndrome     Deafness in right ear     Diabetes mellitus ([facility]     type 2    GERD (gastroesophageal reflux disease)     Hypertension     Morbid obesity ([facility]     Neuromuscular disorder ([facility]     Non-ST elevation MI (NSTEMI) ([facility] 10/2015    patient was transported to [facility] due to aversion from [facility]         Past Surgical History:   Procedure Laterality Date    CARDIAC CATHETERIZATION  3 11 14    Mildl to moderate CAD. Normal left venricular size and systolic functiion. Systemic hypertension. [address] Bilateral     [address]    COLONOSCOPY      COLONOSCOPY  05/11/2022    CORONARY ANGIOPLASTY  10/2015    Dr. Mohammed Kanner MID/DISTAL CX. PCI of the mid and distal dx artery with Alpine ZACARIAS with MATT grade III flow    DIAGNOSTIC CARDIAC CATH LAB PROCEDURE  02/15/2016    PATENT STENT IN CX, LAD 30%, RCA 40% AT [facility].   EF 60% EF    ENDOSCOPY, COLON, DIAGNOSTIC      GALLBLADDER SURGERY  1982    In [address]     SHOULDER ARTHROSCOPY      SKIN CANCER EXCISION  01/2020    x2-ear-Dr. Lopez Julien    TUMOR REMOVAL  bladder tumor    UPPER GASTROINTESTINAL ENDOSCOPY N/A 8/7/2020    EGD BIOPSY performed by Quentin Durant DO at [facility] ENDOSCOPY         Current Outpatient Medications   Medication Sig Dispense Refill    omeprazole (PRILOSEC) 40 MG delayed release capsule       ALLERGY RELIEF 10 MG tablet       isosorbide mononitrate (IMDUR) 30 MG extended release tablet       Multiple Vitamins-Minerals (THERAPEUTIC MULTIVITAMIN-MINERALS) tablet Take 1 tablet by mouth daily      vitamin B-12 (CYANOCOBALAMIN) 100 MCG tablet Take 50 mcg by mouth daily      niacin (SLO-NIACIN) 500 MG extended release tablet Take 1 tablet by mouth nightly 30 tablet 3    vitamin D3 (CHOLECALCIFEROL) 25 MCG (1000 UT) TABS tablet Take 1 tablet by mouth daily 30 tablet 0    zinc sulfate (ZINCATE) 220 (50 Zn) MG capsule Take 1 capsule by mouth daily 30 capsule 3    Ascorbic Acid (VITAMIN C) 1000 MG tablet Take 1 tablet by mouth daily 30 tablet 3    glipiZIDE (GLUCOTROL) 5 MG tablet Take 10 mg by mouth 4 times daily (with meals and nightly)      insulin 70-30 (HUMULIN 70/30) (70-30) 100 UNIT per ML injection vial Inject 35 Units into the skin Daily with supper       Melatonin 10 MG TABS Take 20 mg by mouth nightly      metoprolol succinate (TOPROL XL) 100 MG extended release tablet Take 100 mg by mouth 2 times daily      DULoxetine (CYMBALTA) 60 MG extended release capsule Take 30 mg by mouth 2 times daily       amLODIPine (NORVASC) 5 MG tablet Take 1 tablet by mouth 2 times daily 60 tablet 1    losartan (COZAAR) 100 MG tablet Take 1 tablet by mouth daily 30 tablet 1    tamsulosin (FLOMAX) 0.4 MG capsule Take 0.4 mg by mouth nightly       insulin glargine (LANTUS) 100 UNIT/ML injection vial Inject 50 Units into the skin 2 times daily      magnesium oxide (MAG-OX) 400 MG tablet Take 400 mg by mouth daily      clopidogrel (PLAVIX) 75 MG tablet Take 75 mg by mouth daily       rosuvastatin (CRESTOR) 40 MG tablet Take 40 mg by mouth daily       aspirin 81 MG tablet Take 81 mg by mouth daily.       sitaGLIPtan (JANUVIA) 100 MG tablet Take 100 mg by mouth daily.  albuterol-ipratropium (COMBIVENT RESPIMAT)  MCG/ACT AERS inhaler Inhale 1 puff into the lungs every 6 hours 5 Inhaler 0    vitamin E 400 UNIT capsule Take 1 capsule by mouth 2 times daily (Patient not taking: Reported on 5/13/2022) 30 capsule 3    insulin 70-30 (HUMULIN 70/30) (70-30) 100 UNIT per ML injection vial Inject 20 Units into the skin daily (before lunch) (Patient not taking: Reported on 5/13/2022)      sodium chloride (OCEAN, BABY AYR) 0.65 % nasal spray 1 spray by Nasal route every 2 hours as needed for Congestion (Patient not taking: Reported on 5/13/2022)      insulin 70-30 (HUMULIN 70/30) (70-30) 100 UNIT per ML injection vial Inject 30 Units into the skin every morning  (Patient not taking: Reported on 5/13/2022)       No current facility-administered medications for this visit.          Allergies as of 05/13/2022 - Fully Reviewed 05/13/2022   Allergen Reaction Noted    Poison ivy extract  01/21/2020    Iodine Hives 05/10/2012    Metformin and related  05/22/2017       Social History     Socioeconomic History    Marital status:      Spouse name: Not on file    Number of children: Not on file    Years of education: Not on file    Highest education level: Not on file   Occupational History    Not on file   Tobacco Use    Smoking status: Never Smoker    Smokeless tobacco: Never Used   Vaping Use    Vaping Use: Never used   Substance and Sexual Activity    Alcohol use: No     Comment: 1 cup coffee daily    Drug use: No    Sexual activity: Never   Other Topics Concern    Not on file   Social History Narrative    Not on file     Social Determinants of Health     Financial Resource Strain:     Difficulty of Paying Living Expenses: Not on file   Food Insecurity:     Worried About Running Out of Food in the Last Year: Not on file    Va of Food in the Last Year: Not on file   Transportation Needs:     Lack of Transportation (Medical): Not on file    Lack of Transportation (Non-Medical):  Not on file   Physical Activity:     Days of Exercise per Week: Not on file    Minutes of Exercise per Session: Not on file   Stress:     Feeling of Stress : Not on file   Social Connections:     Frequency of Communication with Friends and Family: Not on file    Frequency of Social Gatherings with Friends and Family: Not on file    Attends Christian Services: Not on file    Active Member of [address] [place] [address] or Organizations: Not on file    Attends Club or Organization Meetings: Not on file    Marital Status: Not on file   Intimate Partner Violence:     Fear of Current or Ex-Partner: Not on file    Emotionally Abused: Not on file    Physically Abused: Not on file    Sexually Abused: Not on file   Housing Stability:     Unable to Pay for Housing in the Last Year: Not on file    Number of [place] in the Last Year: Not on file    Unstable Housing in the Last Year: Not on file       Family History   Adopted: Yes   Family history unknown: Yes       REVIEW OF SYSTEMS:   CONSTITUTIONAL:  negative for  fevers, chills, sweats and fatigue  EYES:  negative for  double vision, blurred vision and blind spots  HEENT:  negative for  tinnitus, earaches, nasal congestion and epistaxis  RESPIRATORY:  negative for  dry cough, cough with sputum, dyspnea, wheezing and hemoptysis  CARDIOVASCULAR: as per HPI  GASTROINTESTINAL:  negative for nausea, vomiting, diarrhea, constipation, pruritus and jaundice  GENITOURINARY:  negative for frequency, dysuria, nocturia, urinary incontinence and hesitancy  HEMATOLOGIC/LYMPHATIC:  negative for easy bruising, bleeding, lymphadenopathy and petechiae  ALLERGIC/IMMUNOLOGIC:  negative for urticaria, hay fever and angioedema  ENDOCRINE:  negative for heat intolerance, cold intolerance, tremor, hair loss and diabetic symptoms including neither polyuria nor polydipsia nor blurred vision  MUSCULOSKELETAL:  negative for  myalgias, arthralgias, joint swelling, stiff joints and decreased range of motion  NEUROLOGICAL:  negative for memory problems, speech problems, visual disturbance, dysphagia, weakness and numbness        PHYSICAL EXAM:   CONSTITUTIONAL:  awake, alert, cooperative, no apparent distress, and appears stated age  HEAD:  normocepalic, without obvious abnormality, atraumatic, pink, moist mucous membranes. NECK:  Supple, symmetrical, trachea midline, no adenopathy, thyroid symmetric, not enlarged and no tenderness, skin normal  LUNGS:  No increased work of breathing, good air exchange, clear to auscultation bilaterally, no crackles or wheezing  CARDIOVASCULAR:  Normal apical impulse, regular rate and rhythm, normal S1 and S2, no S3 or S4, and no murmur noted and no JVD, no carotid bruit, no pedal edema, good carotid upstroke bilaterally. ABDOMEN:  Soft, nontender, no masses, no hepatomegaly or splenomegaly, BS+  CHEST: nontender to palpation, expands symmetrically  MUSCULOSKELETAL:  No clubbing no cyanosis. there is no redness, warmth, or swelling of the joints  full range of motion noted  NEUROLOGIC:  Alert, awake,oriented x3. SKIN:  no bruising or bleeding, normal skin color, texture, turgor and no redness, warmth, or swelling        /76   Pulse 74   Resp 16   Ht 6' 1\" (1.854 m)   Wt 264 lb (119.7 kg)   BMI 34.83 kg/m²     DATA:   I personally reviewed the visit EKG with the following interpretation: Sinus rhythm, early transition, normal axis    EKG 4/422/22 Sinus tachycardia  Pulmonary disease pattern  Incomplete right bundle branch block  Left anterior fascicular block  Cannot rule out Inferior infarct (cited on or before 07-AUG-2020)  Abnormal ECG     No significant change was found    ECHO: 1/21/20 Summary   Ejection fraction is visually estimated at 45%. Trileaflet aortic valve. Normal leaflet mobility. No aortic stenosis. No aortic regurgitation. Mild left ventricular concentric hypertrophy noted.    Ejection fraction is visually estimated at 45%. E/A flow reversal noted. Suggestive of diastolic dysfunction. Mildly dilated aortic root. Physiologic and/or trace mitral regurgitation is present. No evidence for hemodynamically significant pericardial effusion. Normal right atrium. Mildly, Moderately dilated right ventricle. RVSP is 13 mmHg. Physiologic and/or trace tricuspid regurgitation. Stress Test: 1/21/20   The patient was given 0.4 mg of IV Lexiscan at rest. Then, 27.1 mCi of   Cardiolite were injected, followed by standard SPECT imaging.  The   patient received  8.5 mCi  of Cardiolite for the resting study with   repeat comparison imaging.  Lexiscan induced ischemia is not   demonstrated.           Impression   Pharmacologic stress myocardial perfusion imaging within   normal limits. Stress induced ischemia is not demonstrated. Angiography: 3/11/14 CONCLUSIONS:   1.    Mild to moderate coronary artery disease as described above. 2.    Normal left ventricular size and systolic function. 3.    Systemic hypertension. 4.    Elevated left ventricular end diastolic pressure consistent with   diastolic dysfunction. 5.    Closure of the right femoral artery access site with the Angio-Seal      device.   Cardiology Labs: BMP:    Lab Results   Component Value Date     04/22/2022    K 3.4 04/22/2022     04/22/2022    CO2 25 04/22/2022    BUN 15 04/22/2022    CREATININE 0.9 04/22/2022     CMP:    Lab Results   Component Value Date     04/22/2022    K 3.4 04/22/2022     04/22/2022    CO2 25 04/22/2022    BUN 15 04/22/2022    CREATININE 0.9 04/22/2022    PROT 6.7 04/22/2022     CBC:    Lab Results   Component Value Date    WBC 9.2 04/22/2022    RBC 4.39 04/22/2022    HGB 13.9 04/22/2022    HCT 42.1 04/22/2022    MCV 95.9 04/22/2022    RDW 13.6 04/22/2022     04/22/2022     PT/INR:  No results found for: PTINR  PT/INR Warfarin:  No components found for: PTPATWAR,

## 2022-05-13 ENCOUNTER — OFFICE VISIT (OUTPATIENT)
Dept: CARDIOLOGY CLINIC | Age: 72
End: 2022-05-13
Payer: MEDICARE

## 2022-05-13 VITALS
WEIGHT: 264 LBS | RESPIRATION RATE: 16 BRPM | HEIGHT: 73 IN | SYSTOLIC BLOOD PRESSURE: 130 MMHG | HEART RATE: 74 BPM | DIASTOLIC BLOOD PRESSURE: 76 MMHG | BODY MASS INDEX: 34.99 KG/M2

## 2022-05-13 DIAGNOSIS — I25.10 CORONARY ARTERY DISEASE INVOLVING NATIVE CORONARY ARTERY OF NATIVE HEART WITHOUT ANGINA PECTORIS: ICD-10-CM

## 2022-05-13 DIAGNOSIS — I25.10 CORONARY ARTERY DISEASE INVOLVING NATIVE CORONARY ARTERY OF NATIVE HEART WITHOUT ANGINA PECTORIS: Primary | ICD-10-CM

## 2022-05-13 DIAGNOSIS — R07.9 CHEST PAIN, UNSPECIFIED TYPE: Primary | ICD-10-CM

## 2022-05-13 PROCEDURE — 99214 OFFICE O/P EST MOD 30 MIN: CPT | Performed by: INTERNAL MEDICINE

## 2022-05-13 PROCEDURE — 93000 ELECTROCARDIOGRAM COMPLETE: CPT | Performed by: INTERNAL MEDICINE

## 2022-05-13 RX ORDER — OMEPRAZOLE 40 MG/1
CAPSULE, DELAYED RELEASE ORAL
COMMUNITY
Start: 2022-04-08

## 2022-05-13 RX ORDER — UBIDECARENONE 75 MG
50 CAPSULE ORAL DAILY
COMMUNITY

## 2022-05-13 RX ORDER — ISOSORBIDE MONONITRATE 30 MG/1
TABLET, EXTENDED RELEASE ORAL
COMMUNITY
Start: 2022-03-31

## 2022-05-13 RX ORDER — M-VIT,TX,IRON,MINS/CALC/FOLIC 27MG-0.4MG
1 TABLET ORAL DAILY
COMMUNITY

## 2022-05-13 RX ORDER — LORATADINE 10 MG/1
TABLET ORAL
COMMUNITY
Start: 2022-02-17

## 2022-06-16 ENCOUNTER — TELEPHONE (OUTPATIENT)
Dept: CARDIOLOGY | Age: 72
End: 2022-06-16

## 2022-06-16 NOTE — TELEPHONE ENCOUNTER
Called patient's wife to confirm stress test appointment- Lacey Miles was available so I spoke with Lacey Miles and confirmed stress test appointment on June 21, 2022 at 0830. Instructions for test given including holding all diabetic medications morning of test. Pre procedure covid checklist reviewed. Also instructed to call office if unable to keep appointment.

## 2022-06-16 NOTE — TELEPHONE ENCOUNTER
Called patient to confirm stress test appointment on June 21, 2022 at 0830 but no answer- unable to leave message d/t mailbox is full. No other number on chart to call.

## 2022-06-21 ENCOUNTER — HOSPITAL ENCOUNTER (OUTPATIENT)
Dept: CARDIOLOGY | Age: 72
Discharge: HOME | End: 2022-06-21

## 2022-06-21 ENCOUNTER — HOSPITAL ENCOUNTER (OUTPATIENT)
Dept: CARDIOLOGY | Age: 72
Discharge: HOME | End: 2022-06-21
Payer: MEDICARE

## 2022-06-21 VITALS
BODY MASS INDEX: 34.99 KG/M2 | WEIGHT: 264 LBS | DIASTOLIC BLOOD PRESSURE: 86 MMHG | HEIGHT: 73 IN | HEART RATE: 69 BPM | SYSTOLIC BLOOD PRESSURE: 132 MMHG

## 2022-06-21 DIAGNOSIS — R07.9 CHEST PAIN, UNSPECIFIED TYPE: ICD-10-CM

## 2022-06-21 DIAGNOSIS — I25.10 CORONARY ARTERY DISEASE INVOLVING NATIVE CORONARY ARTERY OF NATIVE HEART WITHOUT ANGINA PECTORIS: ICD-10-CM

## 2022-06-21 PROCEDURE — 6360000002 HC RX W HCPCS: Performed by: INTERNAL MEDICINE

## 2022-06-21 PROCEDURE — 2580000003 HC RX 258: Performed by: INTERNAL MEDICINE

## 2022-06-21 PROCEDURE — 93017 CV STRESS TEST TRACING ONLY: CPT

## 2022-06-21 PROCEDURE — 3430000000 HC RX DIAGNOSTIC RADIOPHARMACEUTICAL: Performed by: INTERNAL MEDICINE

## 2022-06-21 PROCEDURE — 78452 HT MUSCLE IMAGE SPECT MULT: CPT

## 2022-06-21 PROCEDURE — A9502 TC99M TETROFOSMIN: HCPCS | Performed by: INTERNAL MEDICINE

## 2022-06-21 RX ORDER — SODIUM CHLORIDE 0.9 % (FLUSH) 0.9 %
10 SYRINGE (ML) INJECTION PRN
Status: DISCONTINUED | OUTPATIENT
Start: 2022-06-21 | End: 2022-06-22 | Stop reason: HOSPADM

## 2022-06-21 RX ADMIN — SODIUM CHLORIDE, PRESERVATIVE FREE 10 ML: 5 INJECTION INTRAVENOUS at 10:27

## 2022-06-21 RX ADMIN — SODIUM CHLORIDE, PRESERVATIVE FREE 10 ML: 5 INJECTION INTRAVENOUS at 08:29

## 2022-06-21 RX ADMIN — REGADENOSON 0.4 MG: 0.08 INJECTION, SOLUTION INTRAVENOUS at 10:27

## 2022-06-21 RX ADMIN — TETROFOSMIN 12 MILLICURIE: 0.23 INJECTION, POWDER, LYOPHILIZED, FOR SOLUTION INTRAVENOUS at 08:29

## 2022-06-21 RX ADMIN — TETROFOSMIN 35 MILLICURIE: 0.23 INJECTION, POWDER, LYOPHILIZED, FOR SOLUTION INTRAVENOUS at 10:27

## 2022-06-21 RX ADMIN — SODIUM CHLORIDE, PRESERVATIVE FREE 10 ML: 5 INJECTION INTRAVENOUS at 10:26

## 2022-06-21 NOTE — PROCEDURES
[address] [place], [facility]. Trevon Benjamin[address]  [phone]                 Pharmacologic Stress Nuclear Gated SPECT Study    Name: Arturo Fishman Account Number: [de-identified]    :  1950          Sex: male         Date of Study:  2022    Height: 6' 1\" (185.4 cm)         Weight: 264 lb (119.7 kg)     Ordering Provider: Irving Rodrigues MD          PCP: Isha Johnson DO      Cardiologist: Irving Rodrigues MD             Interpreting Physician: Irving Rodrigues MD  _________________________________________________________________________________    Indication:   Evaluation of extent and severity of coronary artery disease    Clinical History:   Patient has prior history of coronary artery disease. Resting ECG:    Sinus rhythm, nonspecific intraventricular conduction delay    Procedure:   Pharmacologic stress testing was performed with regadenoson 0.4 mg for 15 seconds. The heart rate was 69 at baseline and luciana to 90 beats during the infusion. The blood pressure at baseline was 132/86 and blood pressure at the end of infusion was 96/66. Blood pressure response was normal during the stress procedure. The patient experienced mild Shortness of breath during the infusion. ECG during the infusion did not change. IMAGING: Myocardial perfusion imaging was performed at rest 30-35 minutes following the intravenous injection of 12 mCi of (Tc-tetrofosmin) followed by 10 ml of Normal Saline. As per infusion protocol, the patient was injected intravenously with 35 mCi of (Tc-tetrofosmin) followed by 10 ml of Normal Saline. Gated post-stress tomographic imaging was performed 45 minutes after stress. FINDINGS: The overall quality of the study was good.      Left ventricular cavity size was noted to be normal.    Rotational analog analysis demonstrated increased subdiaphragmatic radiotracer uptake, soft tissue attenuation artifact    The gated SPECT rest and stress imaging in the short, vertical long, and horizontal long axis demonstrated a reversible apical defect, decreased uptake of the radioactive tracer in the inferior wall on rest and stress images with improvement on stress images which is consistent with artifact. Gated SPECT left ventricular ejection fraction was calculated to be 39%, with paradoxical septal wall motion    Impression:    1. Electrocardiographically normal regadenoson infusion with a clinically  non-ischemic response  2. Myocardial perfusion imaging revealed a small reversible apical defect most likely represents soft tissue attenuation artifact  3. Overall left ventricular systolic function was Moderately reduced with paradoxical septal wall motion. 4.   Intermediate risk post MI pharmacologic stress test.    Thank you for sending your patient to this [facility].      Electronically signed by Dayanara Escalera MD on 6/21/22 at 9:12 PM EDT

## 2022-06-24 ENCOUNTER — TELEPHONE (OUTPATIENT)
Dept: CARDIOLOGY CLINIC | Age: 72
End: 2022-06-24

## 2022-06-24 DIAGNOSIS — R94.39 ABNORMAL RESULT OF OTHER CARDIOVASCULAR FUNCTION STUDY: ICD-10-CM

## 2022-06-24 DIAGNOSIS — G93.32 CHRONIC FATIGUE SYNDROME: ICD-10-CM

## 2022-06-24 DIAGNOSIS — R06.02 SHORTNESS OF BREATH: ICD-10-CM

## 2022-06-24 DIAGNOSIS — Z01.810 PREOP CARDIOVASCULAR EXAM: ICD-10-CM

## 2022-06-24 DIAGNOSIS — I10 PRIMARY HYPERTENSION: Primary | ICD-10-CM

## 2022-06-24 DIAGNOSIS — E08.22 DIABETES MELLITUS DUE TO UNDERLYING CONDITION WITH CHRONIC KIDNEY DISEASE, WITHOUT LONG-TERM CURRENT USE OF INSULIN, UNSPECIFIED CKD STAGE (HCC): ICD-10-CM

## 2022-06-24 NOTE — TELEPHONE ENCOUNTER
Spoke to patient - he states he is having jaw pain, chest pain and some shortness of breath.   I told him you might want to pursue a heart catheterization and he is agreeable - seeking advisement

## 2022-06-24 NOTE — TELEPHONE ENCOUNTER
Very small reversible defect, will continue current treatment for now, unless he continues to have symptoms and then he will need cardiac catheterization6 months unless symptomatic

## 2022-06-28 ENCOUNTER — HOSPITAL ENCOUNTER (OUTPATIENT)
Age: 72
Discharge: HOME | End: 2022-06-28
Payer: MEDICARE

## 2022-06-28 ENCOUNTER — HOSPITAL ENCOUNTER (OUTPATIENT)
Age: 72
Discharge: HOME | End: 2022-06-30

## 2022-06-28 DIAGNOSIS — E08.22 DIABETES MELLITUS DUE TO UNDERLYING CONDITION WITH CHRONIC KIDNEY DISEASE, WITHOUT LONG-TERM CURRENT USE OF INSULIN, UNSPECIFIED CKD STAGE (HCC): ICD-10-CM

## 2022-06-28 DIAGNOSIS — Z01.810 PREOP CARDIOVASCULAR EXAM: ICD-10-CM

## 2022-06-28 DIAGNOSIS — R06.02 SHORTNESS OF BREATH: ICD-10-CM

## 2022-06-28 DIAGNOSIS — G93.32 CHRONIC FATIGUE SYNDROME: ICD-10-CM

## 2022-06-28 DIAGNOSIS — I10 PRIMARY HYPERTENSION: ICD-10-CM

## 2022-06-28 LAB
ABO/RH: NORMAL
ALBUMIN SERPL-MCNC: 4.3 G/DL (ref 3.5–5.2)
ALP BLD-CCNC: 63 U/L (ref 40–129)
ALT SERPL-CCNC: 15 U/L (ref 0–40)
ANION GAP SERPL CALCULATED.3IONS-SCNC: 10 MMOL/L (ref 7–16)
ANTIBODY SCREEN: NORMAL
APTT: 32.9 SEC (ref 24.5–35.1)
AST SERPL-CCNC: 15 U/L (ref 0–39)
BASOPHILS ABSOLUTE: 0.04 E9/L (ref 0–0.2)
BASOPHILS RELATIVE PERCENT: 0.5 % (ref 0–2)
BILIRUB SERPL-MCNC: 0.9 MG/DL (ref 0–1.2)
BILIRUBIN URINE: NEGATIVE
BLOOD, URINE: NEGATIVE
BUN BLDV-MCNC: 13 MG/DL (ref 6–23)
CALCIUM SERPL-MCNC: 9 MG/DL (ref 8.6–10.2)
CHLORIDE BLD-SCNC: 102 MMOL/L (ref 98–107)
CHOLESTEROL, FASTING: 117 MG/DL (ref 0–199)
CLARITY: CLEAR
CO2: 25 MMOL/L (ref 22–29)
COLOR: YELLOW
CREAT SERPL-MCNC: 0.8 MG/DL (ref 0.7–1.2)
EOSINOPHILS ABSOLUTE: 0.26 E9/L (ref 0.05–0.5)
EOSINOPHILS RELATIVE PERCENT: 3.5 % (ref 0–6)
GFR AFRICAN AMERICAN: >60
GFR NON-AFRICAN AMERICAN: >60 ML/MIN/1.73
GLUCOSE FASTING: 189 MG/DL (ref 74–99)
GLUCOSE URINE: NEGATIVE MG/DL
HCT VFR BLD CALC: 44.6 % (ref 37–54)
HDLC SERPL-MCNC: 53 MG/DL
HEMOGLOBIN: 15 G/DL (ref 12.5–16.5)
IMMATURE GRANULOCYTES #: 0.02 E9/L
IMMATURE GRANULOCYTES %: 0.3 % (ref 0–5)
INR BLD: 1.1
KETONES, URINE: NEGATIVE MG/DL
LDL CHOLESTEROL CALCULATED: 33 MG/DL (ref 0–99)
LEUKOCYTE ESTERASE, URINE: NEGATIVE
LYMPHOCYTES ABSOLUTE: 2.08 E9/L (ref 1.5–4)
LYMPHOCYTES RELATIVE PERCENT: 27.8 % (ref 20–42)
MCH RBC QN AUTO: 31.4 PG (ref 26–35)
MCHC RBC AUTO-ENTMCNC: 33.6 % (ref 32–34.5)
MCV RBC AUTO: 93.5 FL (ref 80–99.9)
MONOCYTES ABSOLUTE: 0.44 E9/L (ref 0.1–0.95)
MONOCYTES RELATIVE PERCENT: 5.9 % (ref 2–12)
NEUTROPHILS ABSOLUTE: 4.63 E9/L (ref 1.8–7.3)
NEUTROPHILS RELATIVE PERCENT: 62 % (ref 43–80)
NITRITE, URINE: NEGATIVE
PDW BLD-RTO: 13.3 FL (ref 11.5–15)
PH UA: 5 (ref 5–9)
PLATELET # BLD: 167 E9/L (ref 130–450)
PMV BLD AUTO: 10.4 FL (ref 7–12)
POTASSIUM SERPL-SCNC: 3.8 MMOL/L (ref 3.5–5)
PROSTATE SPECIFIC ANTIGEN: 0.07 NG/ML (ref 0–4)
PROTEIN UA: NEGATIVE MG/DL
PROTHROMBIN TIME: 12.6 SEC (ref 9.3–12.4)
RBC # BLD: 4.77 E12/L (ref 3.8–5.8)
SODIUM BLD-SCNC: 137 MMOL/L (ref 132–146)
SPECIFIC GRAVITY UA: 1.02 (ref 1–1.03)
T4 TOTAL: 7 MCG/DL (ref 4.5–11.7)
TOTAL PROTEIN: 7.4 G/DL (ref 6.4–8.3)
TRIGLYCERIDE, FASTING: 154 MG/DL (ref 0–149)
TSH SERPL DL<=0.05 MIU/L-ACNC: 1.12 UIU/ML (ref 0.27–4.2)
UROBILINOGEN, URINE: 0.2 E.U./DL
VLDLC SERPL CALC-MCNC: 31 MG/DL
WBC # BLD: 7.5 E9/L (ref 4.5–11.5)

## 2022-06-28 PROCEDURE — 36415 COLL VENOUS BLD VENIPUNCTURE: CPT

## 2022-06-28 PROCEDURE — 80053 COMPREHEN METABOLIC PANEL: CPT

## 2022-06-28 PROCEDURE — 86803 HEPATITIS C AB TEST: CPT

## 2022-06-28 PROCEDURE — 85730 THROMBOPLASTIN TIME PARTIAL: CPT

## 2022-06-28 PROCEDURE — 85610 PROTHROMBIN TIME: CPT

## 2022-06-28 PROCEDURE — G0103 PSA SCREENING: HCPCS

## 2022-06-28 PROCEDURE — 84436 ASSAY OF TOTAL THYROXINE: CPT

## 2022-06-28 PROCEDURE — 84443 ASSAY THYROID STIM HORMONE: CPT

## 2022-06-28 PROCEDURE — 85025 COMPLETE CBC W/AUTO DIFF WBC: CPT

## 2022-06-28 PROCEDURE — 80061 LIPID PANEL: CPT

## 2022-06-28 PROCEDURE — 81003 URINALYSIS AUTO W/O SCOPE: CPT

## 2022-06-28 RX ORDER — PREDNISONE 50 MG/1
TABLET ORAL
Qty: 3 TABLET | Refills: 0 | Status: SHIPPED | OUTPATIENT
Start: 2022-06-28

## 2022-06-29 ENCOUNTER — TELEPHONE (OUTPATIENT)
Dept: CARDIAC CATH/INVASIVE PROCEDURES | Age: 72
End: 2022-06-29

## 2022-06-29 LAB — HEPATITIS C ANTIBODY INTERPRETATION: NORMAL

## 2022-06-29 NOTE — TELEPHONE ENCOUNTER
Reminded patient of scheduled procedure on  6/30   Instructions given and COVID questionnaire completed.

## 2022-06-30 ENCOUNTER — HOSPITAL ENCOUNTER (OUTPATIENT)
Dept: CARDIAC CATH/INVASIVE PROCEDURES | Age: 72
Discharge: HOME | End: 2022-06-30
Payer: MEDICARE

## 2022-06-30 VITALS
SYSTOLIC BLOOD PRESSURE: 118 MMHG | TEMPERATURE: 97.7 F | RESPIRATION RATE: 20 BRPM | WEIGHT: 264 LBS | HEIGHT: 72 IN | DIASTOLIC BLOOD PRESSURE: 75 MMHG | BODY MASS INDEX: 35.76 KG/M2 | OXYGEN SATURATION: 97 % | HEART RATE: 85 BPM

## 2022-06-30 PROCEDURE — C1769 GUIDE WIRE: HCPCS

## 2022-06-30 PROCEDURE — 6370000000 HC RX 637 (ALT 250 FOR IP): Performed by: INTERNAL MEDICINE

## 2022-06-30 PROCEDURE — 93454 CORONARY ARTERY ANGIO S&I: CPT

## 2022-06-30 PROCEDURE — C1894 INTRO/SHEATH, NON-LASER: HCPCS

## 2022-06-30 PROCEDURE — 6360000002 HC RX W HCPCS

## 2022-06-30 PROCEDURE — 2500000003 HC RX 250 WO HCPCS

## 2022-06-30 PROCEDURE — 93454 CORONARY ARTERY ANGIO S&I: CPT | Performed by: INTERNAL MEDICINE

## 2022-06-30 PROCEDURE — 2709999900 HC NON-CHARGEABLE SUPPLY

## 2022-06-30 RX ORDER — ACETAMINOPHEN 325 MG/1
650 TABLET ORAL EVERY 4 HOURS PRN
Status: DISCONTINUED | OUTPATIENT
Start: 2022-06-30 | End: 2022-07-01 | Stop reason: HOSPADM

## 2022-06-30 RX ORDER — SODIUM CHLORIDE 0.9 % (FLUSH) 0.9 %
5-40 SYRINGE (ML) INJECTION PRN
Status: DISCONTINUED | OUTPATIENT
Start: 2022-06-30 | End: 2022-07-01 | Stop reason: HOSPADM

## 2022-06-30 RX ORDER — SODIUM CHLORIDE 9 MG/ML
INJECTION, SOLUTION INTRAVENOUS PRN
Status: DISCONTINUED | OUTPATIENT
Start: 2022-06-30 | End: 2022-07-01 | Stop reason: HOSPADM

## 2022-06-30 RX ORDER — ASPIRIN 325 MG
325 TABLET ORAL ONCE
Status: DISCONTINUED | OUTPATIENT
Start: 2022-06-30 | End: 2022-06-30

## 2022-06-30 RX ORDER — SODIUM CHLORIDE 0.9 % (FLUSH) 0.9 %
5-40 SYRINGE (ML) INJECTION EVERY 12 HOURS SCHEDULED
Status: DISCONTINUED | OUTPATIENT
Start: 2022-06-30 | End: 2022-07-01 | Stop reason: HOSPADM

## 2022-06-30 RX ORDER — CLOPIDOGREL BISULFATE 75 MG/1
75 TABLET ORAL ONCE
Status: COMPLETED | OUTPATIENT
Start: 2022-06-30 | End: 2022-06-30

## 2022-06-30 RX ORDER — ASPIRIN 81 MG/1
81 TABLET, CHEWABLE ORAL ONCE
Status: COMPLETED | OUTPATIENT
Start: 2022-06-30 | End: 2022-06-30

## 2022-06-30 RX ADMIN — CLOPIDOGREL BISULFATE 75 MG: 75 TABLET ORAL at 13:26

## 2022-06-30 RX ADMIN — ASPIRIN 81 MG: 81 TABLET, CHEWABLE ORAL at 13:26

## 2022-06-30 NOTE — PROCEDURES
[address]                  [address] [place],  [facility]                            CARDIAC CATHETERIZATION    PATIENT NAME: Maria Antonia Hamilton                     :        1950  MED REC NO:   [id number]                            ROOM:  ACCOUNT NO:   [de-identified]                           ADMIT DATE: 2022  PROVIDER:     Mark Dennis MD    DATE OF PROCEDURE:  2022    PROCEDURES:  1. Coronary angiography. 2.  Conscious sedation using Versed and fentanyl. Indication #4, score of 8. INDICATION FOR PROCEDURE:  The patient is a 76-year-old male with a  history of CAD, status post PCI to left circumflex artery in , has  been having chest pain. The patient had abnormal stress test.  The  patient was brought to the cath lab for further evaluation. DESCRIPTION OF PROCEDURE:  After the appropriate informed consent, the  right wrist area was prepped and draped in the usual sterile fashion. A  timeout was completed. The right wrist area was locally anesthetized  with 2 mL of 2% lidocaine. A 21-gauge needle was used to access the  right radial artery. A 6-Belarusian introducer sheath was used to cannulate  the right radial artery. A 6-Belarusian JL3.5 and 6-Belarusian JR4 catheters  were used for coronary angiography in multiple projections. ANGIOGRAPHIC FINDINGS:  The left main coronary artery arises normally from the left sinus of  Valsalva. It is a large vessel without any disease. It trifurcates  into left anterior descending artery, left circumflex artery and ramus  intermedius artery. The left anterior descending artery is a good-sized vessel extends down  to the apex. There is 30% proximal disease of the LAD and there is mild  disease involving the distal LAD where it wraps around the apex. The  ramus intermedius artery is a large vessel that subdivided immediately  after its takeoff into two large branches.   The inferior subbranch has  very distal disease. The rest of the vessels have no disease. The left circumflex artery is a mid-sized vessel that gives off a  mid-sized OM branch. The left circumflex artery and its branches have  no CAD. The right coronary artery is a large vessel, dominant circulation,  crain's crook takeoff. There is a 40% stenosis at the junction of  the proximal and mid segment. The rest of the vessel has mild disease. The right PDA and the right PLV have mild disease. At the end of the procedure, the catheter and the wire were pulled out  from the body, the sheath was pulled out from the body and a Vasc Band  was applied to the right wrist area with effective hemostasis. COMPLICATIONS:  None. MEDICATIONS USED DURING THE PROCEDURE:  We used intraarterial verapamil  to prevent vasospasm. We used intraarterial heparin for  anticoagulation. CONSCIOUS SEDATION:  We used Versed and fentanyl for conscious sedation. First dose was given at 61 54 78, procedure ended at 1737. There Was 15  minutes of direct face-to-face supervision during conscious sedation  administration. TOTAL CONTRAST USED:  65 mL of Isovue. TOTAL FLUOROSCOPY TIME:  1.2 minutes. IMPRESSION:  1. Moderate coronary calcifications. 2.  Mild disease involving the LAD. 3.  Patent stent in the left circumflex artery. 4.  Moderate disease involving the very distal segment of the ramus  intermedius artery. 5.  40% stenosis involving the junction of the proximal and mid segment  of the RCA. RECOMMENDATIONS:  1. Continue current treatment. 2.  The patient will be observed for two hours and discharge home if he  meets discharge criteria.         Shellie Ann MD    D: 06/30/2022 18:01:00       T: 06/30/2022 18:19:43     [email or web address]  Job#: [id number]     Doc#: [id number]    CC:

## 2022-06-30 NOTE — H&P
Department of Medicine  Section of Cardiology  Attending Procedure History and Physical        Pre-Procedural Diagnosis: Chest pain, abnormal stress test, CAD    Indications: Chest pain, abnormal stress test, CAD    Procedure Planned: Cardiac catheterization    History obtained from patient    History of Present Illness: The patient is a 66 y.o. male who presents for the above procedure. We will do CAD s/p PCI to left circumflex artery in 2015, has been having chest pain, patient was found to have abnormal stress test, patient is here for cardiac catheterization further evaluation    Past Medical History:        Diagnosis Date    Arthritis     CAD (coronary artery disease) 3/20/2014    Cancer [facility]     skin-x2 (ear-Dr. Nikhil Squires)    Chronic fatigue fibromyalgia syndrome     Deafness in right ear     Diabetes mellitus ([facility]     type 2    GERD (gastroesophageal reflux disease)     Hypertension     Morbid obesity ([facility]     Neuromuscular disorder ([facility]     Non-ST elevation MI (NSTEMI) ([facility] 10/2015    patient was transported to [facility] due to aversion from [facility]       Past Surgical History:        Procedure Laterality Date    CARDIAC CATHETERIZATION  3 11 14    Mildl to moderate CAD. Normal left venricular size and systolic functiion. Systemic hypertension. [address] Bilateral     [address]    COLONOSCOPY      COLONOSCOPY  05/11/2022    CORONARY ANGIOPLASTY  10/2015    Dr. Karon Ba MID/DISTAL CX. PCI of the mid and distal dx artery with Alpine ZACARIAS with MATT grade III flow    DIAGNOSTIC CARDIAC CATH LAB PROCEDURE  02/15/2016    PATENT STENT IN CX, LAD 30%, RCA 40% AT [facility].   EF 60% EF    ENDOSCOPY, COLON, DIAGNOSTIC      GALLBLADDER SURGERY  1982    In [facility] Right     SHOULDER ARTHROSCOPY      SKIN CANCER EXCISION  01/2020    x2-ear-Dr. Nikhil Squires    TUMOR REMOVAL  bladder tumor    UPPER GASTROINTESTINAL ENDOSCOPY N/A 8/7/2020    EGD BIOPSY performed by Elizabet Myers DO at [facility] ENDOSCOPY     Medications:    Current Outpatient Rx   Medication Sig Dispense Refill    predniSONE (DELTASONE) 50 MG tablet Take 1 tablet by mouth thirteen hours prior to procedure, Take 1 tablet by mouth 8 hours prior to procedure, Take 1 tablet by moth 1 hour prior to procedure. 3 tablet 0    diphenhydrAMINE (BENADRYL) 50 MG tablet Take 1 tablet by mouth thirteen hours prior to procedure, Take 1 tablet by mouth 8 hours prior to procedure, Take 1 tablet by moth 1 hour prior to procedure.  3 tablet 0    omeprazole (PRILOSEC) 40 MG delayed release capsule       ALLERGY RELIEF 10 MG tablet       isosorbide mononitrate (IMDUR) 30 MG extended release tablet       Multiple Vitamins-Minerals (THERAPEUTIC MULTIVITAMIN-MINERALS) tablet Take 1 tablet by mouth daily      vitamin B-12 (CYANOCOBALAMIN) 100 MCG tablet Take 50 mcg by mouth daily      niacin (SLO-NIACIN) 500 MG extended release tablet Take 1 tablet by mouth nightly 30 tablet 3    vitamin E 400 UNIT capsule Take 1 capsule by mouth 2 times daily 30 capsule 3    vitamin D3 (CHOLECALCIFEROL) 25 MCG (1000 UT) TABS tablet Take 1 tablet by mouth daily 30 tablet 0    zinc sulfate (ZINCATE) 220 (50 Zn) MG capsule Take 1 capsule by mouth daily 30 capsule 3    Ascorbic Acid (VITAMIN C) 1000 MG tablet Take 1 tablet by mouth daily 30 tablet 3    glipiZIDE (GLUCOTROL) 5 MG tablet Take 10 mg by mouth 4 times daily (with meals and nightly)      insulin 70-30 (HUMULIN 70/30) (70-30) 100 UNIT per ML injection vial Inject 20 Units into the skin daily (before lunch)       insulin 70-30 (HUMULIN 70/30) (70-30) 100 UNIT per ML injection vial Inject 35 Units into the skin Daily with supper       Melatonin 10 MG TABS Take 20 mg by mouth nightly      metoprolol succinate (TOPROL XL) 100 MG extended release tablet Take 100 mg by mouth 2 times daily      DULoxetine (CYMBALTA) 60 MG extended release capsule Take 30 mg by mouth 2 times daily       sodium chloride (OCEAN, BABY AYR) 0.65 % nasal spray 1 spray by Nasal route every 2 hours as needed for Congestion       amLODIPine (NORVASC) 5 MG tablet Take 1 tablet by mouth 2 times daily 60 tablet 1    losartan (COZAAR) 100 MG tablet Take 1 tablet by mouth daily 30 tablet 1    tamsulosin (FLOMAX) 0.4 MG capsule Take 0.4 mg by mouth nightly       insulin 70-30 (HUMULIN 70/30) (70-30) 100 UNIT per ML injection vial Inject 30 Units into the skin every morning       insulin glargine (LANTUS) 100 UNIT/ML injection vial Inject 50 Units into the skin 2 times daily      magnesium oxide (MAG-OX) 400 MG tablet Take 400 mg by mouth daily      clopidogrel (PLAVIX) 75 MG tablet Take 75 mg by mouth daily       rosuvastatin (CRESTOR) 40 MG tablet Take 40 mg by mouth daily       aspirin 81 MG tablet Take 81 mg by mouth daily.  sitaGLIPtan (JANUVIA) 100 MG tablet Take 100 mg by mouth daily.          Allergies:  Poison ivy extract, Iodine, and Metformin and related    History of allergic reaction to anesthesia:  no    Social History  Non-smoker    REVIEW OF SYSTEMS  CONSTITUTIONAL:  negative for  fevers, chills  HEENT:  negative for earaches, nasal congestion and epistaxis  RESPIRATORY:  negative for  dry cough, cough with sputum,wheezing and hemoptysis  GASTROINTESTINAL:  negative for nausea, vomiting  MUSCULOSKELETAL:  negative for  myalgias, arthralgias  NEUROLOGICAL:  negative for visual disturbance, dysphagia    PHYSICAL EXAM    /75   Pulse 85   Temp 97.7 °F (36.5 °C)   Resp 20   Ht 6' (1.829 m)   Wt 264 lb (119.7 kg)   SpO2 97% Comment: Room air  BMI 35.80 kg/m²   CONSTITUTIONAL:  awake, alert, cooperative, no apparent distress, and appears stated age  HEAD:  normocepalic, without obvious abnormality, atraumatic  NECK:  Supple, symmetrical, trachea midline, no adenopathy, thyroid symmetric, not enlarged and no tenderness, skin normal  LUNGS:  No increased work of breathing, good air exchange, clear to auscultation bilaterally, no crackles or wheezing  CARDIOVASCULAR:  Normal apical impulse, regular rate and rhythm, normal S1 and S2, no S3 or S4, and no murmur noted, no edema, no JVD, no carotid bruit. ABDOMEN:  Soft, nontender, no masses, no hepatomegaly, no splenomegaly, BS+  MUSCULOSKELETAL:  No clubbing no cyanosis. there is no redness, warmth, or swelling of the joints  full range of motion noted  NEUROLOGIC:  Alert, awake,oriented x3  SKIN:  no bruising or bleeding, normal skin color, texture, turgor and no redness, warmth, or swelling    DATA    CBC:    Lab Results   Component Value Date/Time    WBC 7.5 06/28/2022 11:20 AM    RBC 4.77 06/28/2022 11:20 AM    HGB 15.0 06/28/2022 11:20 AM    HCT 44.6 06/28/2022 11:20 AM    MCV 93.5 06/28/2022 11:20 AM    RDW 13.3 06/28/2022 11:20 AM     06/28/2022 11:20 AM     Platelet:    Lab Results   Component Value Date/Time     06/28/2022 11:20 AM     BUN/Cr:    Lab Results   Component Value Date/Time    BUN 13 06/28/2022 11:20 AM     Potassium:    Lab Results   Component Value Date/Time    K 3.8 06/28/2022 11:20 AM    K 3.4 04/22/2022 03:15 PM     PT/INR:  No results found for: PTINR  PTT:    Lab Results   Component Value Date/Time    APTT 32.9 06/28/2022 11:20 AM         ASSESSMENT AND PLAN    1. Patient is a 66 y.o. male with above specified procedure planned cardiac catheterization under conscious sedation    Expected Sedation/Anesthesia Type:  conscious sedation    2. ASA ([address] Anesthesiology) Anesthesia Status: 2    3. Procedure options, risks and benefits reviewed with Patient. Patient expresses understanding    4. Patient has not been on anticoagulation medications    5. Consent has been signed:   Yes

## 2022-07-01 ENCOUNTER — TELEPHONE (OUTPATIENT)
Dept: CARDIOLOGY CLINIC | Age: 72
End: 2022-07-01

## 2022-08-31 NOTE — PROGRESS NOTES
9773 Went into patients room to give patient his morning medication. Patient was moaning, shaking, saying he was cold, nauseous and stating he could not breath. Patient was mouth breathing and having episode of anxiety/panic attack. Simin lion manager and Justina Mejía RN came to the room. 0915  T- 97.3 A, P- 93, BP - 178/90, 94-98 RA, and R - 52. Instructed the patient to breath through nose, and out his mouth, to slow his breathing. Patient continued to breath through his mouth. Justina Mejía RN, put his hand on the patients chin and closed his mouth so he would breathe through his nose, to slow down his breathing. Patient breathing started to slow down resp of 22 and stated to patient he was having a panic attack. Patient did not have any anxiety medication. 0930 Gave the PRN morphine. Patient states his was dying and stated to the patient, no you are not dying, started breathing through mouth again and asked for the man that closed his mouth earlier to come back to the room and close his mouth because patient was unable to. Instructed patient to slow breath through his nose. Patient asked if the COVID test that was done in ED was a false negative and that he really does have the COVID because he was nauseous, weak, in pain and dying. Stated the covid test was negative and he did not have the COVID. Called made to Dr. Dennis Hanson. Dr. Dennis Hanson ordered cardiac enzymes, EKG, and once dose of Vistaril. 0955 once dose of Vistaril. Once medication was given, patient was calm and carried on a conversation with this nurse. 1030 checked on patient and he was sleeping and resting comfortably. 1100 checked on patient to get his blood sugar and patient was calm. No

## 2023-05-04 ENCOUNTER — OFFICE VISIT (OUTPATIENT)
Dept: CARDIOLOGY CLINIC | Age: 73
End: 2023-05-04
Payer: MEDICARE

## 2023-05-04 VITALS
BODY MASS INDEX: 38.74 KG/M2 | RESPIRATION RATE: 20 BRPM | SYSTOLIC BLOOD PRESSURE: 112 MMHG | HEIGHT: 72 IN | WEIGHT: 286 LBS | DIASTOLIC BLOOD PRESSURE: 60 MMHG | HEART RATE: 77 BPM

## 2023-05-04 DIAGNOSIS — I25.10 CORONARY ARTERY DISEASE INVOLVING NATIVE CORONARY ARTERY OF NATIVE HEART WITHOUT ANGINA PECTORIS: Primary | ICD-10-CM

## 2023-05-04 PROCEDURE — 1123F ACP DISCUSS/DSCN MKR DOCD: CPT | Performed by: INTERNAL MEDICINE

## 2023-05-04 PROCEDURE — 3078F DIAST BP <80 MM HG: CPT | Performed by: INTERNAL MEDICINE

## 2023-05-04 PROCEDURE — 99214 OFFICE O/P EST MOD 30 MIN: CPT | Performed by: INTERNAL MEDICINE

## 2023-05-04 PROCEDURE — 93000 ELECTROCARDIOGRAM COMPLETE: CPT | Performed by: INTERNAL MEDICINE

## 2023-05-04 PROCEDURE — 3074F SYST BP LT 130 MM HG: CPT | Performed by: INTERNAL MEDICINE

## 2023-05-04 RX ORDER — AZELASTINE 1 MG/ML
1 SPRAY, METERED NASAL 2 TIMES DAILY
COMMUNITY

## 2023-05-04 RX ORDER — BUDESONIDE AND FORMOTEROL FUMARATE DIHYDRATE 160; 4.5 UG/1; UG/1
2 AEROSOL RESPIRATORY (INHALATION) 2 TIMES DAILY
COMMUNITY

## 2023-05-04 RX ORDER — DUTASTERIDE 0.5 MG/1
0.5 CAPSULE, LIQUID FILLED ORAL DAILY
COMMUNITY

## 2023-05-04 NOTE — PROGRESS NOTES
[facility] Cardiology Progress Note  Dr. Jenny Mason      Referring Physician: Tootie Lozada DO  CHIEF COMPLAINT:   Chief Complaint   Patient presents with    Coronary Artery Disease     Annual- c/o CP /tires easily       HISTORY OF PRESENT ILLNESS:   67year old male with history of CAD, hypertension, diabetes mellitus, is here for follow-up. .  Still gets the chest pain, comes and goes, at rest and with exertion, no palpitations, no pedal edema, no PND, no orthopnea, no syncope, no presyncopal episodes. Last vision in the left eye, was evaluated by ophthalmology, is not clear about the details, however that was not an embolic event    Past Medical History:   Diagnosis Date    Arthritis     CAD (coronary artery disease) 3/20/2014    Cancer ([address]     skin-x2 (ear-Dr. Charli Leonardo)    Chronic fatigue fibromyalgia syndrome     Deafness in right ear     Diabetes mellitus ([address]     type 2    GERD (gastroesophageal reflux disease)     Hypertension     Morbid obesity ([address]     Neuromuscular disorder ([address]     Non-ST elevation MI (NSTEMI) ([facility] [address] 10/2015    patient was transported to [facility] due to aversion from [facility]         Past Surgical History:   Procedure Laterality Date    CARDIAC CATHETERIZATION  3 11 14    Mildl to moderate CAD. Normal left venricular size and systolic functiion. Systemic hypertension. CARDIAC CATHETERIZATION  2022    Dr. Pedro Wang    COLONOSCOPY      COLONOSCOPY  2022    CORONARY ANGIOPLASTY  10/2015    Dr. Aleyda Rodriguez. PCI of the mid and distal dx artery with Alpine ZACARIAS with MATT grade III flow    DIAGNOSTIC CARDIAC CATH LAB PROCEDURE  02/15/2016    PATENT STENT IN CX, LAD 30%, RCA 40% AT [facility].   EF 60% EF    ENDOSCOPY, COLON, DIAGNOSTIC      GALLBLADDER SURGERY      In [address] Right     SHOULDER ARTHROSCOPY      SKIN CANCER EXCISION  01/2020    x2-ear-Dr. Charli Leonardo    TUMOR REMOVAL  bladder tumor

## 2023-06-29 ENCOUNTER — OFFICE VISIT (OUTPATIENT)
Dept: CARDIOLOGY CLINIC | Age: 73
End: 2023-06-29

## 2023-06-29 VITALS
RESPIRATION RATE: 16 BRPM | WEIGHT: 280 LBS | HEIGHT: 73 IN | BODY MASS INDEX: 37.11 KG/M2 | DIASTOLIC BLOOD PRESSURE: 82 MMHG | HEART RATE: 76 BPM | SYSTOLIC BLOOD PRESSURE: 124 MMHG

## 2023-06-29 DIAGNOSIS — R06.02 SOB (SHORTNESS OF BREATH): ICD-10-CM

## 2023-06-29 DIAGNOSIS — I25.10 CORONARY ARTERY DISEASE INVOLVING NATIVE CORONARY ARTERY OF NATIVE HEART WITHOUT ANGINA PECTORIS: Primary | ICD-10-CM

## 2023-06-29 RX ORDER — AMLODIPINE BESYLATE 10 MG/1
10 TABLET ORAL DAILY
COMMUNITY
Start: 2023-05-15

## 2023-08-31 ENCOUNTER — HOSPITAL ENCOUNTER (OUTPATIENT)
Dept: CARDIOLOGY | Age: 73
Discharge: HOME | End: 2023-08-31
Attending: INTERNAL MEDICINE
Payer: MEDICARE

## 2023-08-31 DIAGNOSIS — I25.10 CORONARY ARTERY DISEASE INVOLVING NATIVE CORONARY ARTERY OF NATIVE HEART WITHOUT ANGINA PECTORIS: ICD-10-CM

## 2023-08-31 DIAGNOSIS — R06.02 SOB (SHORTNESS OF BREATH): ICD-10-CM

## 2023-08-31 PROCEDURE — 93306 TTE W/DOPPLER COMPLETE: CPT

## 2023-09-22 ENCOUNTER — TELEPHONE (OUTPATIENT)
Dept: CARDIOLOGY CLINIC | Age: 73
End: 2023-09-22

## 2024-03-12 ENCOUNTER — TRANSCRIBE ORDERS (OUTPATIENT)
Age: 74
End: 2024-03-12

## 2024-03-12 DIAGNOSIS — I27.21 PULMONARY ARTERY HYPERTENSION (HCC): Primary | ICD-10-CM

## 2024-07-02 ENCOUNTER — HOSPITAL ENCOUNTER (OUTPATIENT)
Age: 74
Discharge: HOME | End: 2024-07-04
Payer: COMMERCIAL

## 2024-07-02 VITALS
WEIGHT: 280 LBS | BODY MASS INDEX: 37.11 KG/M2 | HEIGHT: 73 IN | DIASTOLIC BLOOD PRESSURE: 82 MMHG | SYSTOLIC BLOOD PRESSURE: 124 MMHG

## 2024-07-02 DIAGNOSIS — I27.21 PULMONARY ARTERY HYPERTENSION (HCC): ICD-10-CM

## 2024-07-02 PROCEDURE — 93306 TTE W/DOPPLER COMPLETE: CPT

## 2024-07-03 LAB
ECHO AV AREA PEAK VELOCITY: 2.7 CM2
ECHO AV AREA PEAK VELOCITY: 2.7 CM2
ECHO AV AREA PEAK VELOCITY: 2.8 CM2
ECHO AV AREA PEAK VELOCITY: 2.9 CM2
ECHO AV AREA VTI: 4 CM2
ECHO AV AREA/BSA VTI: 1.6 CM2/M2
ECHO AV CUSP MM: 2.4 CM
ECHO AV MEAN GRADIENT: 4 MMHG
ECHO AV MEAN VELOCITY: 0.9 M/S
ECHO AV PEAK GRADIENT: 6 MMHG
ECHO AV PEAK GRADIENT: 6 MMHG
ECHO AV PEAK VELOCITY: 1.2 M/S
ECHO AV PEAK VELOCITY: 1.2 M/S
ECHO AV VTI: 25.9 CM
ECHO BSA: 2.56 M2
ECHO EST RA PRESSURE: 3 MMHG
ECHO LA DIAMETER INDEX: 1.61 CM/M2
ECHO LA DIAMETER: 4 CM
ECHO LA VOL A-L A2C: 79 ML (ref 18–58)
ECHO LA VOL A-L A4C: 64 ML (ref 18–58)
ECHO LA VOL BP: 72 ML (ref 18–58)
ECHO LA VOL MOD A2C: 83 ML (ref 18–58)
ECHO LA VOL MOD A4C: 63 ML (ref 18–58)
ECHO LA VOL/BSA BIPLANE: 29 ML/M2 (ref 16–34)
ECHO LA VOLUME AREA LENGTH: 71 ML
ECHO LA VOLUME INDEX A-L A2C: 32 ML/M2 (ref 16–34)
ECHO LA VOLUME INDEX A-L A4C: 26 ML/M2 (ref 16–34)
ECHO LA VOLUME INDEX AREA LENGTH: 29 ML/M2 (ref 16–34)
ECHO LA VOLUME INDEX MOD A2C: 33 ML/M2 (ref 16–34)
ECHO LA VOLUME INDEX MOD A4C: 25 ML/M2 (ref 16–34)
ECHO LV FRACTIONAL SHORTENING: 32 % (ref 28–44)
ECHO LV INTERNAL DIMENSION DIASTOLE INDEX: 2.42 CM/M2
ECHO LV INTERNAL DIMENSION DIASTOLIC: 6 CM (ref 4.2–5.9)
ECHO LV INTERNAL DIMENSION SYSTOLIC INDEX: 1.65 CM/M2
ECHO LV INTERNAL DIMENSION SYSTOLIC: 4.1 CM
ECHO LV IVSD: 1.3 CM (ref 0.6–1)
ECHO LV IVSS: 1.9 CM
ECHO LV MASS 2D: 331.8 G (ref 88–224)
ECHO LV MASS INDEX 2D: 133.8 G/M2 (ref 49–115)
ECHO LV POSTERIOR WALL DIASTOLIC: 1.2 CM (ref 0.6–1)
ECHO LV POSTERIOR WALL SYSTOLIC: 1.5 CM
ECHO LV RELATIVE WALL THICKNESS RATIO: 0.4
ECHO LVOT AREA: 4.2 CM2
ECHO LVOT AV VTI INDEX: 0.93
ECHO LVOT DIAM: 2.3 CM
ECHO LVOT MEAN GRADIENT: 2 MMHG
ECHO LVOT PEAK GRADIENT: 2 MMHG
ECHO LVOT PEAK GRADIENT: 2 MMHG
ECHO LVOT PEAK VELOCITY: 0.8 M/S
ECHO LVOT PEAK VELOCITY: 0.8 M/S
ECHO LVOT STROKE VOLUME INDEX: 40.5 ML/M2
ECHO LVOT SV: 100.5 ML
ECHO LVOT VTI: 24.2 CM
ECHO MV "A" WAVE DURATION: 190.3 MSEC
ECHO MV A VELOCITY: 0.92 M/S
ECHO MV AREA PHT: 3.6 CM2
ECHO MV AREA VTI: 3.9 CM2
ECHO MV E DECELERATION TIME (DT): 451 MS
ECHO MV E VELOCITY: 0.55 M/S
ECHO MV E/A RATIO: 0.6
ECHO MV LVOT VTI INDEX: 1.07
ECHO MV MAX VELOCITY: 1.1 M/S
ECHO MV MEAN GRADIENT: 1 MMHG
ECHO MV MEAN VELOCITY: 0.5 M/S
ECHO MV PEAK GRADIENT: 5 MMHG
ECHO MV PRESSURE HALF TIME (PHT): 61.3 MS
ECHO MV VTI: 25.8 CM
ECHO PV MAX VELOCITY: 1.1 M/S
ECHO PV MEAN GRADIENT: 2 MMHG
ECHO PV MEAN VELOCITY: 0.7 M/S
ECHO PV PEAK GRADIENT: 5 MMHG
ECHO PV VTI: 20.9 CM
ECHO PVEIN A DURATION: 190.3 MS
ECHO PVEIN A VELOCITY: 0.3 M/S
ECHO PVEIN PEAK D VELOCITY: 0.3 M/S
ECHO PVEIN PEAK S VELOCITY: 0.4 M/S
ECHO PVEIN S/D RATIO: 1.3
ECHO RIGHT VENTRICULAR SYSTOLIC PRESSURE (RVSP): 9 MMHG
ECHO RV INTERNAL DIMENSION: 3.7 CM
ECHO RV LONGITUDINAL DIMENSION: 6.8 CM
ECHO RV MID DIMENSION: 3 CM
ECHO RVOT MEAN GRADIENT: 1 MMHG
ECHO RVOT PEAK GRADIENT: 2 MMHG
ECHO RVOT PEAK VELOCITY: 0.7 M/S
ECHO RVOT VTI: 15.2 CM
ECHO TV REGURGITANT MAX VELOCITY: 1.27 M/S
ECHO TV REGURGITANT PEAK GRADIENT: 6 MMHG

## 2024-07-03 PROCEDURE — 93306 TTE W/DOPPLER COMPLETE: CPT | Performed by: INTERNAL MEDICINE

## 2025-02-10 ENCOUNTER — OFFICE VISIT (OUTPATIENT)
Dept: FAMILY MEDICINE CLINIC | Age: 75
End: 2025-02-10
Payer: MEDICARE

## 2025-02-10 VITALS
DIASTOLIC BLOOD PRESSURE: 60 MMHG | HEART RATE: 78 BPM | BODY MASS INDEX: 37.11 KG/M2 | HEIGHT: 73 IN | OXYGEN SATURATION: 95 % | RESPIRATION RATE: 18 BRPM | TEMPERATURE: 97.5 F | SYSTOLIC BLOOD PRESSURE: 100 MMHG | WEIGHT: 280 LBS

## 2025-02-10 DIAGNOSIS — J32.9 SINOBRONCHITIS: Primary | ICD-10-CM

## 2025-02-10 DIAGNOSIS — J40 SINOBRONCHITIS: Primary | ICD-10-CM

## 2025-02-10 DIAGNOSIS — J45.31 MILD PERSISTENT ASTHMA WITH EXACERBATION: ICD-10-CM

## 2025-02-10 PROCEDURE — 1160F RVW MEDS BY RX/DR IN RCRD: CPT | Performed by: NURSE PRACTITIONER

## 2025-02-10 PROCEDURE — 3074F SYST BP LT 130 MM HG: CPT | Performed by: NURSE PRACTITIONER

## 2025-02-10 PROCEDURE — 1159F MED LIST DOCD IN RCRD: CPT | Performed by: NURSE PRACTITIONER

## 2025-02-10 PROCEDURE — 3078F DIAST BP <80 MM HG: CPT | Performed by: NURSE PRACTITIONER

## 2025-02-10 PROCEDURE — 1123F ACP DISCUSS/DSCN MKR DOCD: CPT | Performed by: NURSE PRACTITIONER

## 2025-02-10 PROCEDURE — 99214 OFFICE O/P EST MOD 30 MIN: CPT | Performed by: NURSE PRACTITIONER

## 2025-02-10 RX ORDER — PREDNISONE 20 MG/1
20 TABLET ORAL 2 TIMES DAILY
Qty: 10 TABLET | Refills: 0 | Status: SHIPPED | OUTPATIENT
Start: 2025-02-10 | End: 2025-02-15

## 2025-02-10 RX ORDER — BUDESONIDE, GLYCOPYRROLATE, AND FORMOTEROL FUMARATE 160; 9; 4.8 UG/1; UG/1; UG/1
AEROSOL, METERED RESPIRATORY (INHALATION)
COMMUNITY
Start: 2024-10-01

## 2025-02-10 RX ORDER — FLUTICASONE PROPIONATE AND SALMETEROL 500; 50 UG/1; UG/1
POWDER RESPIRATORY (INHALATION)
COMMUNITY
Start: 2024-05-14

## 2025-02-10 RX ORDER — BUMETANIDE 0.5 MG/1
0.5 TABLET ORAL DAILY
COMMUNITY

## 2025-02-10 RX ORDER — METOLAZONE 2.5 MG/1
TABLET ORAL
COMMUNITY
Start: 2024-10-14

## 2025-02-10 RX ORDER — DONEPEZIL HYDROCHLORIDE 5 MG/1
5 TABLET, FILM COATED ORAL NIGHTLY
COMMUNITY
Start: 2024-03-29

## 2025-02-10 RX ORDER — CETIRIZINE HYDROCHLORIDE 10 MG/1
10 TABLET ORAL DAILY
COMMUNITY
Start: 2024-02-16

## 2025-02-10 RX ORDER — FLUTICASONE PROPIONATE 50 MCG
SPRAY, SUSPENSION (ML) NASAL
COMMUNITY
Start: 2024-09-10

## 2025-02-10 RX ORDER — GABAPENTIN 600 MG/1
TABLET ORAL
COMMUNITY

## 2025-02-10 RX ORDER — PEN NEEDLE, DIABETIC 29 G X1/2"
NEEDLE, DISPOSABLE MISCELLANEOUS
COMMUNITY
Start: 2024-12-12

## 2025-02-10 RX ORDER — AMOXICILLIN 500 MG/1
TABLET, FILM COATED ORAL
COMMUNITY
Start: 2025-01-06 | End: 2025-02-10

## 2025-02-10 RX ORDER — ECHINACEA 500 MG
CAPSULE ORAL
COMMUNITY
Start: 2023-11-16

## 2025-02-10 RX ORDER — ALBUTEROL SULFATE 90 UG/1
2 INHALANT RESPIRATORY (INHALATION) EVERY 4 HOURS PRN
COMMUNITY
Start: 2025-01-22 | End: 2026-01-22

## 2025-02-10 RX ORDER — DULOXETIN HYDROCHLORIDE 30 MG/1
CAPSULE, DELAYED RELEASE ORAL
COMMUNITY

## 2025-02-10 RX ORDER — DULOXETIN HYDROCHLORIDE 30 MG/1
CAPSULE, DELAYED RELEASE ORAL
COMMUNITY
Start: 2025-01-14

## 2025-02-10 RX ORDER — METOPROLOL TARTRATE 100 MG/1
TABLET ORAL
COMMUNITY
Start: 2025-02-07

## 2025-02-10 RX ORDER — SEMAGLUTIDE 0.68 MG/ML
INJECTION, SOLUTION SUBCUTANEOUS
COMMUNITY
Start: 2025-01-14

## 2025-02-10 RX ORDER — AMOXICILLIN 500 MG/1
CAPSULE ORAL
COMMUNITY
Start: 2024-12-20 | End: 2025-02-10

## 2025-02-10 RX ORDER — FLUTICASONE PROPIONATE AND SALMETEROL XINAFOATE 115; 21 UG/1; UG/1
2 AEROSOL, METERED RESPIRATORY (INHALATION)
COMMUNITY
Start: 2023-10-20

## 2025-02-10 NOTE — PROGRESS NOTES
2/10/25  Roque Lopez : 1950 Sex: male  Age 74 y.o.    Subjective:  Chief Complaint   Patient presents with    Cough     Present for week, coughing up thick yellowish- green mucous.    Nasal Congestion       HPI:   Roque Lopez , 74 y.o. male presents to the clinic for evaluation of cough x 7 days. The patient also reports chest congestion, sinus congestion, ABBOTT. The patient has taken Coricidin HBP, albuterol inhaler for symptoms. The patient reports unchanged symptoms over time. The patient reports ill exposure (great granddaughter). The patient denies headache, sore throat, rash, and fever. The patient also denies chest pain, abdominal pain, wheezing, and nausea / vomiting / diarrhea.    ROS:   Unless otherwise stated in this report the patient's positive and negative responses for review of systems for constitutional, eyes, ENT, cardiovascular, respiratory, gastrointestinal, neurological, , musculoskeletal, and integument systems and related systems to the presenting problem are either stated in the history of present illness or were not pertinent or were negative for the symptoms and/or complaints related to the presenting medical problem.  Positives and pertinent negatives as per HPI.  All others reviewed and are negative.      PMH:     Past Medical History:   Diagnosis Date    Arthritis     CAD (coronary artery disease) 3/20/2014    Cancer ([facility])     skin-x2 (ear-Dr. Levy)    Chronic fatigue fibromyalgia syndrome     Deafness in right ear     Diabetes mellitus ([facility])     type 2    GERD (gastroesophageal reflux disease)     Hypertension     Morbid obesity     Neuromuscular disorder ([facility])     Non-ST elevation MI (NSTEMI) ([facility]) 10/2015    patient was transported to [facility] due to aversion from [facility]       Past Surgical History:   Procedure Laterality Date    CARDIAC CATHETERIZATION  3  14    Mildl to moderate CAD. Normal left venricular size and systolic functiion.  Systemic hypertension.    CARDIAC

## 2025-02-13 ENCOUNTER — TELEPHONE (OUTPATIENT)
Dept: CARDIOLOGY CLINIC | Age: 75
End: 2025-02-13

## 2025-02-18 ENCOUNTER — OFFICE VISIT (OUTPATIENT)
Dept: CARDIOLOGY CLINIC | Age: 75
End: 2025-02-18
Payer: MEDICARE

## 2025-02-18 VITALS
HEIGHT: 72 IN | WEIGHT: 292 LBS | HEART RATE: 83 BPM | BODY MASS INDEX: 39.55 KG/M2 | DIASTOLIC BLOOD PRESSURE: 70 MMHG | RESPIRATION RATE: 18 BRPM | SYSTOLIC BLOOD PRESSURE: 124 MMHG

## 2025-02-18 DIAGNOSIS — I25.10 CORONARY ARTERY DISEASE INVOLVING NATIVE CORONARY ARTERY OF NATIVE HEART WITHOUT ANGINA PECTORIS: Primary | ICD-10-CM

## 2025-02-18 PROCEDURE — 3074F SYST BP LT 130 MM HG: CPT | Performed by: INTERNAL MEDICINE

## 2025-02-18 PROCEDURE — 1159F MED LIST DOCD IN RCRD: CPT | Performed by: INTERNAL MEDICINE

## 2025-02-18 PROCEDURE — 99213 OFFICE O/P EST LOW 20 MIN: CPT | Performed by: INTERNAL MEDICINE

## 2025-02-18 PROCEDURE — 1160F RVW MEDS BY RX/DR IN RCRD: CPT | Performed by: INTERNAL MEDICINE

## 2025-02-18 PROCEDURE — 3078F DIAST BP <80 MM HG: CPT | Performed by: INTERNAL MEDICINE

## 2025-02-18 PROCEDURE — 1123F ACP DISCUSS/DSCN MKR DOCD: CPT | Performed by: INTERNAL MEDICINE

## 2025-02-18 PROCEDURE — 93000 ELECTROCARDIOGRAM COMPLETE: CPT | Performed by: INTERNAL MEDICINE

## 2025-02-18 RX ORDER — AZITHROMYCIN 250 MG/1
250 TABLET, FILM COATED ORAL DAILY
COMMUNITY

## 2025-02-18 NOTE — PROGRESS NOTES
[facility] Cardiology Progress Note  Dr. Emeterio Escalante      Referring Physician: Kevin Marie DO  CHIEF COMPLAINT:   Chief Complaint   Patient presents with    Coronary Artery Disease     F/u OV- Patient has complaints of sob, and swelling in feet and legs       HISTORY OF PRESENT ILLNESS:   Patient 74 year old male with history of CAD, hypertension, diabetes mellitus, is here for follow-up.  Pedal edema controlled with, every other day Lasix, no shortness of breath with exertion, denies any chest pain, no palpitations, no pedal edema, no PND, no orthopnea, no syncope, no presyncopal episodes.  Functional capacity is at baseline    Past Medical History:   Diagnosis Date    Arthritis     CAD (coronary artery disease) 3/20/2014    Cancer ([facility])     skin-x2 (ear-Dr. Levy)    Chronic fatigue fibromyalgia syndrome     Deafness in right ear     Diabetes mellitus ([facility])     type 2    GERD (gastroesophageal reflux disease)     Hypertension     Morbid obesity     Neuromuscular disorder ([facility])     Non-ST elevation MI (NSTEMI) ([facility]) 10/2015    patient was transported to [facility] due to aversion from [facility]         Past Surgical History:   Procedure Laterality Date    CARDIAC CATHETERIZATION  3 11 14    Mildl to moderate CAD. Normal left venricular size and systolic functiion.  Systemic hypertension.    CARDIAC CATHETERIZATION  06/30/2022    Dr. Escalante    CATARACT REMOVAL [facility]    COLONOSCOPY      COLONOSCOPY  05/11/2022    CORONARY ANGIOPLASTY  10/2015    Dr. Kurt Garcia:ZACARIAS IN MID/DISTAL CX. PCI of the mid and distal dx artery with Alpine ZACARIAS with MATT grade III flow    DIAGNOSTIC CARDIAC CATH LAB PROCEDURE  02/15/2016    PATENT STENT IN CX, LAD 30%, RCA 40% AT [facility].  EF 60% EF    ENDOSCOPY, COLON, DIAGNOSTIC      GALLBLADDER SURGERY  1982    In [place]    [employer] [employer] [facility] Right     SHOULDER ARTHROSCOPY      SKIN CANCER EXCISION  01/2020    x2-ear-Dr. Levy    TUMOR REMOVAL  bladder tumor    UPPER

## 2025-06-06 ENCOUNTER — HOSPITAL ENCOUNTER (OUTPATIENT)
Age: 75
Discharge: HOME | End: 2025-06-06
Payer: MEDICARE

## 2025-06-06 LAB
BASOPHILS # BLD: 0.05 K/UL (ref 0–0.2)
BASOPHILS NFR BLD: 1 % (ref 0–2)
EOSINOPHIL # BLD: 0.3 K/UL (ref 0.05–0.5)
EOSINOPHILS RELATIVE PERCENT: 3 % (ref 0–6)
ERYTHROCYTE [DISTWIDTH] IN BLOOD BY AUTOMATED COUNT: 13.6 % (ref 11.5–15)
HCT VFR BLD AUTO: 39.9 % (ref 37–54)
HGB BLD-MCNC: 13.5 G/DL (ref 12.5–16.5)
IMM GRANULOCYTES # BLD AUTO: 0.04 K/UL (ref 0–0.58)
IMM GRANULOCYTES NFR BLD: 0 % (ref 0–5)
LYMPHOCYTES NFR BLD: 2.58 K/UL (ref 1.5–4)
LYMPHOCYTES RELATIVE PERCENT: 29 % (ref 20–42)
MCH RBC QN AUTO: 31.9 PG (ref 26–35)
MCHC RBC AUTO-ENTMCNC: 33.8 G/DL (ref 32–34.5)
MCV RBC AUTO: 94.3 FL (ref 80–99.9)
MONOCYTES NFR BLD: 0.63 K/UL (ref 0.1–0.95)
MONOCYTES NFR BLD: 7 % (ref 2–12)
NEUTROPHILS NFR BLD: 60 % (ref 43–80)
NEUTS SEG NFR BLD: 5.41 K/UL (ref 1.8–7.3)
PLATELET # BLD AUTO: 201 K/UL (ref 130–450)
PMV BLD AUTO: 11.2 FL (ref 7–12)
RBC # BLD AUTO: 4.23 M/UL (ref 3.8–5.8)
WBC OTHER # BLD: 9 K/UL (ref 4.5–11.5)

## 2025-06-06 PROCEDURE — 85025 COMPLETE CBC W/AUTO DIFF WBC: CPT

## 2025-06-06 PROCEDURE — 86003 ALLG SPEC IGE CRUDE XTRC EA: CPT

## 2025-06-06 PROCEDURE — 82785 ASSAY OF IGE: CPT

## 2025-06-06 PROCEDURE — 36415 COLL VENOUS BLD VENIPUNCTURE: CPT

## 2025-06-10 LAB
A ALTERNATA IGE QN: <0.1 KU/L (ref 0–0.34)
A FUMIGATUS IGE QN: <0.1 KU/L (ref 0–0.34)
ALLERGEN BIRCH IGE: <0.1 KU/L (ref 0–0.34)
BERMUDA GRASS IGE QN: <0.1 KU/L (ref 0–0.34)
BOXELDER IGE QN: <0.1 KU/L (ref 0–0.34)
C HERBARUM IGE QN: <0.1 KUL/L (ref 0–0.34)
CALIF WALNUT POLN IGE QN: <0.1 KU/L (ref 0–0.34)
CAT DANDER IGE QN: <0.1 KU/L (ref 0–0.34)
CMN PIGWEED IGE QN: <0.1 KU/L (ref 0–0.34)
COMMON RAGWEED IGE QN: <0.1 KU/L (ref 0–0.34)
COTTONWOOD IGE QN: <0.1 KU/L (ref 0–0.34)
D FARINAE IGE QN: <0.1 KU/L (ref 0–0.34)
D PTERONYSS IGE QN: <0.1 KU/L (ref 0–0.34)
DOG DANDER IGE QN: <0.1 KU/L (ref 0–0.34)
IGE SERPL-ACNC: 294 IU/ML (ref 0–100)
LONDON PLANE IGE QN: <0.1 KU/L (ref 0–0.34)
M RACEMOSUS IGE QN: <0.1 KU/L (ref 0–0.34)
MOUSE EPITH IGE QN: <0.1 KU/L (ref 0–0.34)
MT JUNIPER IGE QN: <0.1 KU/L (ref 0–0.34)
P NOTATUM IGE QN: <0.1 KU/L (ref 0–0.34)
PECAN/HICK TREE IGE QN: <0.1 KU/L (ref 0–0.34)
ROACH IGE QN: <0.1 KU/L (ref 0–0.34)
SALTWORT IGE QN: <0.1 KU/L (ref 0–0.34)
SHEEP SORREL IGE QN: <0.1 KU/L (ref 0–0.34)
TIMOTHY IGE QN: <0.1 KU/L (ref 0–0.34)
WHITE ASH IGE QN: <0.1 KU/L (ref 0–0.34)
WHITE ELM IGE QN: <0.1 KU/L (ref 0–0.34)
WHITE MULBERRY IGE QN: <0.1 KU/L (ref 0–0.34)
WHITE OAK IGE QN: <0.1 KU/L (ref 0–0.34)

## 2025-07-08 ENCOUNTER — TELEPHONE (OUTPATIENT)
Dept: SLEEP CENTER | Age: 75
End: 2025-07-08

## 2025-07-08 NOTE — TELEPHONE ENCOUNTER
Multiple calls placed to patient to attempt to schedule sleep study per provider order including home, cell, and spouse. No answer.     Letter to be sent by mail.

## 2025-08-27 PROBLEM — Z86.73 HISTORY OF STROKE: Status: ACTIVE | Noted: 2020-01-09

## 2025-09-05 ENCOUNTER — TELEPHONE (OUTPATIENT)
Age: 75
End: 2025-09-05

## (undated) DEVICE — MASK,FACE,MAXFLUIDPROTECT,SHIELD/ERLPS: Brand: MEDLINE

## (undated) DEVICE — FORCEPS BX L240CM JAW DIA2.8MM L CAP W/ NDL MIC MESH TOOTH

## (undated) DEVICE — TOWEL,OR,DSP,ST,BLUE,STD,6/PK,12PK/CS: Brand: MEDLINE

## (undated) DEVICE — CONTAINER SPEC COLL 960ML POLYPR TRIANG GRAD INTAKE/OUTPUT

## (undated) DEVICE — LUBRICANT SURG JELLY ST BACTER TUBE 4.25OZ

## (undated) DEVICE — GOWN ISOLATN REG YEL M WT MULTIPLY SIDETIE LEV 2

## (undated) DEVICE — KENDALL 450 SERIES MONITORING FOAM ELECTRODE - RECTANGULAR SHAPE ( 3/PK): Brand: KENDALL

## (undated) DEVICE — KIT BEDSIDE REVITAL OX 500ML

## (undated) DEVICE — BLOCK BITE 60FR CAREGUARD

## (undated) DEVICE — Device: Brand: DEFENDO VALVE AND CONNECTOR KIT

## (undated) DEVICE — YANKAUER,BULB TIP,W/O VENT,RIGID,STERILE: Brand: MEDLINE

## (undated) DEVICE — COVER,LIGHT HANDLE,FLX,1/PK: Brand: MEDLINE INDUSTRIES, INC.

## (undated) DEVICE — SPONGE GZ 4IN 4IN 4 PLY N WVN AVANT

## (undated) DEVICE — 6 X 9  1.75MIL 4-WALL LABGUARD: Brand: MINIGRIP COMMERCIAL LLC

## (undated) DEVICE — TUBING, SUCTION, 1/4" X 10', STRAIGHT: Brand: MEDLINE